# Patient Record
Sex: FEMALE | Race: WHITE | NOT HISPANIC OR LATINO | Employment: OTHER | URBAN - METROPOLITAN AREA
[De-identification: names, ages, dates, MRNs, and addresses within clinical notes are randomized per-mention and may not be internally consistent; named-entity substitution may affect disease eponyms.]

---

## 2017-07-13 ENCOUNTER — HOSPITAL ENCOUNTER (OUTPATIENT)
Dept: CT IMAGING | Facility: CLINIC | Age: 82
Discharge: HOME | End: 2017-07-13
Attending: PLASTIC SURGERY | Admitting: PLASTIC SURGERY
Payer: COMMERCIAL

## 2017-07-13 DIAGNOSIS — K43.9 VENTRAL HERNIA: ICD-10-CM

## 2017-07-13 LAB
CREAT BLD-MCNC: 0.9 MG/DL (ref 0.52–1.04)
GFR SERPL CREATININE-BSD FRML MDRD: 60 ML/MIN/1.7M2

## 2017-07-13 PROCEDURE — 25000125 ZZHC RX 250: Performed by: PLASTIC SURGERY

## 2017-07-13 PROCEDURE — 74177 CT ABD & PELVIS W/CONTRAST: CPT

## 2017-07-13 PROCEDURE — 25000128 H RX IP 250 OP 636: Performed by: PLASTIC SURGERY

## 2017-07-13 PROCEDURE — 82565 ASSAY OF CREATININE: CPT

## 2017-07-13 RX ORDER — IOPAMIDOL 755 MG/ML
93 INJECTION, SOLUTION INTRAVASCULAR ONCE
Status: COMPLETED | OUTPATIENT
Start: 2017-07-13 | End: 2017-07-13

## 2017-07-13 RX ADMIN — IOPAMIDOL 93 ML: 755 INJECTION, SOLUTION INTRAVENOUS at 11:05

## 2017-07-13 RX ADMIN — SODIUM CHLORIDE 68 ML: 9 INJECTION, SOLUTION INTRAVENOUS at 11:05

## 2018-05-25 ENCOUNTER — APPOINTMENT (OUTPATIENT)
Dept: GENERAL RADIOLOGY | Facility: CLINIC | Age: 83
End: 2018-05-25
Attending: EMERGENCY MEDICINE
Payer: COMMERCIAL

## 2018-05-25 ENCOUNTER — HOSPITAL ENCOUNTER (EMERGENCY)
Facility: CLINIC | Age: 83
Discharge: HOME | End: 2018-05-25
Attending: EMERGENCY MEDICINE | Admitting: EMERGENCY MEDICINE
Payer: COMMERCIAL

## 2018-05-25 VITALS
TEMPERATURE: 98.8 F | HEART RATE: 76 BPM | RESPIRATION RATE: 20 BRPM | WEIGHT: 185 LBS | DIASTOLIC BLOOD PRESSURE: 70 MMHG | SYSTOLIC BLOOD PRESSURE: 167 MMHG | BODY MASS INDEX: 32.78 KG/M2 | OXYGEN SATURATION: 96 % | HEIGHT: 63 IN

## 2018-05-25 DIAGNOSIS — R09.89 UPPER RESPIRATORY SYMPTOM: ICD-10-CM

## 2018-05-25 LAB
ALBUMIN SERPL-MCNC: 4.1 G/DL (ref 3.4–5)
ALP SERPL-CCNC: 69 U/L (ref 40–150)
ALT SERPL W P-5'-P-CCNC: 18 U/L (ref 0–50)
ANION GAP SERPL CALCULATED.3IONS-SCNC: 7 MMOL/L (ref 3–14)
AST SERPL W P-5'-P-CCNC: 17 U/L (ref 0–45)
BASOPHILS # BLD AUTO: 0 10E9/L (ref 0–0.2)
BASOPHILS NFR BLD AUTO: 0.1 %
BILIRUB SERPL-MCNC: 0.4 MG/DL (ref 0.2–1.3)
BUN SERPL-MCNC: 14 MG/DL (ref 7–30)
CALCIUM SERPL-MCNC: 9.7 MG/DL (ref 8.5–10.1)
CHLORIDE SERPL-SCNC: 104 MMOL/L (ref 94–109)
CO2 SERPL-SCNC: 27 MMOL/L (ref 20–32)
CREAT SERPL-MCNC: 0.7 MG/DL (ref 0.52–1.04)
DIFFERENTIAL METHOD BLD: NORMAL
EOSINOPHIL # BLD AUTO: 0.1 10E9/L (ref 0–0.7)
EOSINOPHIL NFR BLD AUTO: 1.1 %
ERYTHROCYTE [DISTWIDTH] IN BLOOD BY AUTOMATED COUNT: 13.1 % (ref 10–15)
GFR SERPL CREATININE-BSD FRML MDRD: 80 ML/MIN/1.7M2
GLUCOSE SERPL-MCNC: 100 MG/DL (ref 70–99)
HCT VFR BLD AUTO: 37.9 % (ref 35–47)
HGB BLD-MCNC: 12.9 G/DL (ref 11.7–15.7)
IMM GRANULOCYTES # BLD: 0 10E9/L (ref 0–0.4)
IMM GRANULOCYTES NFR BLD: 0.2 %
LYMPHOCYTES # BLD AUTO: 1.6 10E9/L (ref 0.8–5.3)
LYMPHOCYTES NFR BLD AUTO: 17.8 %
MCH RBC QN AUTO: 30.9 PG (ref 26.5–33)
MCHC RBC AUTO-ENTMCNC: 34 G/DL (ref 31.5–36.5)
MCV RBC AUTO: 91 FL (ref 78–100)
MONOCYTES # BLD AUTO: 0.8 10E9/L (ref 0–1.3)
MONOCYTES NFR BLD AUTO: 9.1 %
NEUTROPHILS # BLD AUTO: 6.3 10E9/L (ref 1.6–8.3)
NEUTROPHILS NFR BLD AUTO: 71.7 %
NRBC # BLD AUTO: 0 10*3/UL
NRBC BLD AUTO-RTO: 0 /100
PLATELET # BLD AUTO: 222 10E9/L (ref 150–450)
POTASSIUM SERPL-SCNC: 3.5 MMOL/L (ref 3.4–5.3)
PROT SERPL-MCNC: 7.4 G/DL (ref 6.8–8.8)
RBC # BLD AUTO: 4.18 10E12/L (ref 3.8–5.2)
SODIUM SERPL-SCNC: 138 MMOL/L (ref 133–144)
WBC # BLD AUTO: 8.8 10E9/L (ref 4–11)

## 2018-05-25 PROCEDURE — 99284 EMERGENCY DEPT VISIT MOD MDM: CPT | Mod: 25

## 2018-05-25 PROCEDURE — 80053 COMPREHEN METABOLIC PANEL: CPT | Performed by: EMERGENCY MEDICINE

## 2018-05-25 PROCEDURE — 71046 X-RAY EXAM CHEST 2 VIEWS: CPT

## 2018-05-25 PROCEDURE — 85025 COMPLETE CBC W/AUTO DIFF WBC: CPT | Performed by: EMERGENCY MEDICINE

## 2018-05-25 NOTE — ED NOTES
Patient misunderstood her PCP d/c instructions from appointment last week and quit taking her lasix instead of HCTZ; also was taking only 1 potassium and was suppose to be taking two. Her sodium and potassium levels were low at last appointment

## 2018-05-25 NOTE — ED PROVIDER NOTES
"  History     Chief Complaint:    HPI   Deborah Higginbotham is a 86 year old female who presents with abnormal labs. Stopped lasix instead of HCTZ a few days. Starting taking the prescribed potassium dose twice per day instead of once per day as she has been only taking this once per day for several months. Has mild \"head cold\" with runny nose and cough for last few days. Denies confusion and granddaughter reports that there is no confusion. Patient got confused about which medication she should stop and not take but other than that, no confusion.    Allergies:  NKDA    Medications:    Lasix  HCTZ  Potassium    Past Medical History:    High cholesterol  HTN    Past Surgical History:    History reviewed. No pertinent surgical history.     Family History:    History reviewed. No pertinent family history.    Social History:   reports that she has quit smoking. She has never used smokeless tobacco. She reports that she does not use illicit drugs.  PCP: Stu Stanford     Review of Systems  10 point review of systems was obtained and negative other than mentioned above.    Physical Exam   Patient Vitals for the past 24 hrs:   BP Temp Temp src Pulse Resp SpO2 Height Weight   05/25/18 1632 167/70 98.8  F (37.1  C) Oral 76 20 96 % 1.6 m (5' 3\") 83.9 kg (185 lb)        Physical Exam  General: Resting comfortably on the gurney  Eyes:  The pupils are equal and round    Conjunctivae and sclerae are normal  ENT:    Moist mucous membranes  Neck:  Normal range of motion  CV:  Regular rate and rhythm    Skin warm and well perfused   Resp:  Lungs are clear    Non-labored    No rales    No wheezing   GI:  Abdomen is soft, there is no rigidity    No distension    No rebound tenderness     No abdominal tenderness  MS:  Normal muscular tone    Mild bilateral lower extremity edema at ankles/feet  Skin:  No rash or acute skin lesions noted  Neuro:   Awake, alert.      GCS 15    Speech is normal and fluent.    Face is symmetric.     Moves " all extremities  Psych: Normal affect.  Appropriate interactions.    Emergency Department Course     Imaging:    Chest XR,  PA & LAT   Final Result   IMPRESSION: Clear lungs.      RAINE ALICEA MD         All imaging results were discussed with the patient who voiced understanding of the findings.    Laboratory:  Na 138  K 3.5  Cr 0.7  CBC wnl    Interventions:  None    Emergency Department Course:  Past medical records, nursing notes, and vitals reviewed.  I performed an exam of the patient and obtained history, as documented above.    I rechecked the patient. Findings and plan explained to the Patient and granddaughter    Impression & Plan      Medical Decision Making:  Deborah Higginbotham is a 86 year old female who presented to the ED with lab abnormalities. Na was 123 a few days ago in clinic. Patient stopped lasix, continued HCTZ after this lab abnormality found. Has mild URI symptoms. Chest xray clear today. Na repeated and normal at 138. No indication for additional treatment or workup at this time. Patient neurologically intact with no confusion. Discussed that patient can restart her lasix and continue her other medications as prescribed. Has f/u with PCP in 4 days and can follow-up at that time for repeat labs.     Diagnosis:    ICD-10-CM    1. Upper respiratory symptom R09.89         Discharge Medications:  New Prescriptions    No medications on file        5/25/2018   Arina Bustos MD Goertz, Maria Kristine, MD  05/26/18 1005

## 2018-05-25 NOTE — ED AVS SNAPSHOT
Emergency Department    [address] [facility] [address]    Phone:  [phone]    Fax:  [phone]                                       Deborah Higginbotham   MRN: [id number]    Department:   Emergency Department   Date of Visit:  5/25/2018           After Visit Summary Signature Page     I have received my discharge instructions, and my questions have been answered. I have discussed any challenges I see with this plan with the nurse or doctor.    ..........................................................................................................................................  Patient/Patient Representative Signature      ..........................................................................................................................................  Patient Representative Print Name and Relationship to Patient    ..................................................               ................................................  Date                                            Time    ..........................................................................................................................................  Reviewed by Signature/Title    ...................................................              ..............................................  Date                                                            Time

## 2018-05-25 NOTE — ED AVS SNAPSHOT
Emergency Department    [address] [facility] [address]    Phone:  [phone]    Fax:  [phone]                                       Deborah Higginbotham   MRN: [id number]    Department:   Emergency Department   Date of Visit:  5/25/2018           Patient Information     Date Of Birth          9/7/1931        Your diagnoses for this visit were:     Upper respiratory symptom        You were seen by Arina Bustos MD.      Follow-up Information     Schedule an appointment as soon as possible for a visit with Stu Stanford MD.    Specialty:  Family Practice    Contact information:    [employer]  [address]  [facility] [address] [phone]          Follow up with  Emergency Department.    Specialty:  EMERGENCY MEDICINE    Why:  If symptoms worsen    Contact information:    [address] [facility] [address] [phone]        Discharge Instructions       Have the labs repeated on Tuesday as already scheduled  You can restart your lasix and continue your other medications as prescribed    24 Hour Appointment Hotline       To make an appointment at any [facility], call [phone] ([phone]). If you don't have a family doctor or clinic, we will help you find one. [place] clinics are conveniently located to serve the needs of you and your family.             Review of your medicines      Notice     You have not been prescribed any medications.            Procedures and tests performed during your visit     CBC with platelets differential    Chest XR,  PA & LAT    Comprehensive metabolic panel      Orders Needing Specimen Collection     None      Pending Results     Date and Time Order Name Status Description    5/25/2018 1835 Chest XR,  PA & LAT Preliminary             Pending Culture Results     No orders found from 5/23/2018 to 5/26/2018.            Pending Results Instructions     If you had any lab results that were not finalized at the  time of your Discharge, you can call the ED Lab Result RN at [phone]. You will be contacted by this team for any positive Lab results or changes in treatment. The nurses are available 7 days a week from 10A to 6:30P.  You can leave a message 24 hours per day and they will return your call.        Test Results From Your Hospital Stay        5/25/2018  6:59 PM      Component Results     Component Value Ref Range & Units Status    Sodium 138 133 - 144 mmol/L Final    Potassium 3.5 3.4 - 5.3 mmol/L Final    Chloride 104 94 - 109 mmol/L Final    Carbon Dioxide 27 20 - 32 mmol/L Final    Anion Gap 7 3 - 14 mmol/L Final    Glucose 100 (H) 70 - 99 mg/dL Final    Urea Nitrogen 14 7 - 30 mg/dL Final    Creatinine 0.70 0.52 - 1.04 mg/dL Final    GFR Estimate 80 >60 mL/min/1.7m2 Final    Non  GFR Calc    GFR Estimate If Black >90 >60 mL/min/1.7m2 Final    African American GFR Calc    Calcium 9.7 8.5 - 10.1 mg/dL Final    Bilirubin Total 0.4 0.2 - 1.3 mg/dL Final    Albumin 4.1 3.4 - 5.0 g/dL Final    Protein Total 7.4 6.8 - 8.8 g/dL Final    Alkaline Phosphatase 69 40 - 150 U/L Final    ALT 18 0 - 50 U/L Final    AST 17 0 - 45 U/L Final         5/25/2018  6:42 PM      Component Results     Component Value Ref Range & Units Status    WBC 8.8 4.0 - 11.0 10e9/L Final    RBC Count 4.18 3.8 - 5.2 10e12/L Final    Hemoglobin 12.9 11.7 - 15.7 g/dL Final    Hematocrit 37.9 35.0 - 47.0 % Final    MCV 91 78 - 100 fl Final    MCH 30.9 26.5 - 33.0 pg Final    MCHC 34.0 31.5 - 36.5 g/dL Final    RDW 13.1 10.0 - 15.0 % Final    Platelet Count 222 150 - 450 10e9/L Final    Diff Method Automated Method  Final    % Neutrophils 71.7 % Final    % Lymphocytes 17.8 % Final    % Monocytes 9.1 % Final    % Eosinophils 1.1 % Final    % Basophils 0.1 % Final    % Immature Granulocytes 0.2 % Final    Nucleated RBCs 0 0 /100 Final    Absolute Neutrophil 6.3 1.6 - 8.3 10e9/L Final    Absolute Lymphocytes 1.6 0.8 - 5.3 10e9/L Final     Absolute Monocytes 0.8 0.0 - 1.3 10e9/L Final    Absolute Eosinophils 0.1 0.0 - 0.7 10e9/L Final    Absolute Basophils 0.0 0.0 - 0.2 10e9/L Final    Abs Immature Granulocytes 0.0 0 - 0.4 10e9/L Final    Absolute Nucleated RBC 0.0  Final         5/25/2018  7:26 PM      Narrative     CHEST TWO VIEWS 5/25/2018 7:22 PM     COMPARISON: None.    HISTORY: Cough.    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 The cardiac  silhouette, pulmonary vasculature, lungs and pleural spaces are within  normal limits.        Impression     IMPRESSION: Clear lungs.                Clinical Quality Measure: Blood Pressure Screening     Your blood pressure was checked while you were in the emergency department today. The last reading we obtained was  BP: 167/70 . Please read the guidelines below about what these numbers mean and what you should do about them.  If your systolic blood pressure (the top number) is less than 120 and your diastolic blood pressure (the bottom number) is less than 80, then your blood pressure is normal. There is nothing more that you need to do about it.  If your systolic blood pressure (the top number) is 120-139 or your diastolic blood pressure (the bottom number) is 80-89, your blood pressure may be higher than it should be. You should have your blood pressure rechecked within a year by a primary care provider.  If your systolic blood pressure (the top number) is 140 or greater or your diastolic blood pressure (the bottom number) is 90 or greater, you may have high blood pressure. High blood pressure is treatable, but if left untreated over time it can put you at risk for heart attack, stroke, or kidney failure. You should have your blood pressure rechecked by a primary care provider within the next 4 weeks.  If your provider in the emergency department today gave you specific instructions to follow-up with your doctor or provider even sooner than that, you should follow that instruction and not  "wait for up to 4 weeks for your follow-up visit.        Thank you for choosing [facility]       Thank you for choosing [facility] for your care. Our goal is always to provide you with excellent care. Hearing back from our patients is one way we can continue to improve our services. Please take a few minutes to complete the written survey that you may receive in the mail after you visit with us. Thank you!        [employer] Information     [facility] lets you send messages to your doctor, view your test results, renew your prescriptions, schedule appointments and more. To sign up, go to [email or web address] . Click on \"Log in\" on the left side of the screen, which will take you to the Welcome page. Then click on \"Sign up Now\" on the right side of the page.     You will be asked to enter the access code listed below, as well as some personal information. Please follow the directions to create your username and password.     Your access code is: [email or web address]  Expires: 2018  8:15 PM     Your access code will  in 90 days. If you need help or a new code, please call your [facility] clinic or [phone].        Care EveryWhere ID     This is your Care EveryWhere ID. This could be used by other organizations to access your [facility] medical records  [id number]        Equal Access to Services     TIFFANIE ROBERSON AH: Inocencio Rocha, waaxda luqadaha, qaybta kaalmada guzman, gloria villa. So [facility] [phone].    ATENCIÓN: Si habla español, tiene a cook disposición servicios gratuitos de asistencia lingüística. Llame al [phone].    We comply with applicable federal civil rights laws and [place] laws. We do not discriminate on the basis of race, color, national origin, age, disability, sex, sexual orientation, or gender identity.            After Visit Summary       This is your record. Keep this with you and show to your community pharmacist(s) and doctor(s) at your next " visit.

## 2018-05-26 NOTE — DISCHARGE INSTRUCTIONS
Have the labs repeated on Tuesday as already scheduled  You can restart your lasix and continue your other medications as prescribed

## 2018-07-11 DIAGNOSIS — I10 HTN (HYPERTENSION): ICD-10-CM

## 2018-07-11 DIAGNOSIS — R60.9 EDEMA: Primary | ICD-10-CM

## 2018-07-20 ENCOUNTER — HOSPITAL ENCOUNTER (OUTPATIENT)
Dept: CARDIOLOGY | Facility: CLINIC | Age: 83
Discharge: HOME | End: 2018-07-20
Attending: INTERNAL MEDICINE | Admitting: INTERNAL MEDICINE
Payer: COMMERCIAL

## 2018-07-20 DIAGNOSIS — R60.9 EDEMA: ICD-10-CM

## 2018-07-20 DIAGNOSIS — I10 HTN (HYPERTENSION): ICD-10-CM

## 2018-07-20 PROCEDURE — 93306 TTE W/DOPPLER COMPLETE: CPT | Mod: 26 | Performed by: INTERNAL MEDICINE

## 2018-07-20 PROCEDURE — 93306 TTE W/DOPPLER COMPLETE: CPT

## 2018-08-04 ENCOUNTER — ANESTHESIA EVENT (OUTPATIENT)
Dept: MEDSURG UNIT | Facility: CLINIC | Age: 83
End: 2018-08-04
Payer: COMMERCIAL

## 2018-08-04 ENCOUNTER — ANESTHESIA (OUTPATIENT)
Dept: MEDSURG UNIT | Facility: CLINIC | Age: 83
End: 2018-08-04
Payer: COMMERCIAL

## 2018-08-04 ENCOUNTER — HOSPITAL ENCOUNTER (OUTPATIENT)
Facility: CLINIC | Age: 83
Setting detail: OBSERVATION
Discharge: HOME | End: 2018-08-05
Attending: EMERGENCY MEDICINE | Admitting: INTERNAL MEDICINE
Payer: COMMERCIAL

## 2018-08-04 DIAGNOSIS — I16.0 HYPERTENSIVE URGENCY: ICD-10-CM

## 2018-08-04 LAB
ANION GAP SERPL CALCULATED.3IONS-SCNC: 10 MMOL/L (ref 3–14)
BASOPHILS # BLD AUTO: 0 10E9/L (ref 0–0.2)
BASOPHILS NFR BLD AUTO: 0.3 %
BUN SERPL-MCNC: 18 MG/DL (ref 7–30)
CALCIUM SERPL-MCNC: 9.1 MG/DL (ref 8.5–10.1)
CHLORIDE SERPL-SCNC: 103 MMOL/L (ref 94–109)
CO2 SERPL-SCNC: 26 MMOL/L (ref 20–32)
CREAT SERPL-MCNC: 0.93 MG/DL (ref 0.52–1.04)
DIFFERENTIAL METHOD BLD: NORMAL
EOSINOPHIL # BLD AUTO: 0 10E9/L (ref 0–0.7)
EOSINOPHIL NFR BLD AUTO: 0.7 %
ERYTHROCYTE [DISTWIDTH] IN BLOOD BY AUTOMATED COUNT: 12.7 % (ref 10–15)
GFR SERPL CREATININE-BSD FRML MDRD: 57 ML/MIN/1.7M2
GLUCOSE SERPL-MCNC: 98 MG/DL (ref 70–99)
HCT VFR BLD AUTO: 39.4 % (ref 35–47)
HGB BLD-MCNC: 13.4 G/DL (ref 11.7–15.7)
IMM GRANULOCYTES # BLD: 0 10E9/L (ref 0–0.4)
IMM GRANULOCYTES NFR BLD: 0.2 %
LYMPHOCYTES # BLD AUTO: 1.7 10E9/L (ref 0.8–5.3)
LYMPHOCYTES NFR BLD AUTO: 28.8 %
MCH RBC QN AUTO: 30.8 PG (ref 26.5–33)
MCHC RBC AUTO-ENTMCNC: 34 G/DL (ref 31.5–36.5)
MCV RBC AUTO: 91 FL (ref 78–100)
MONOCYTES # BLD AUTO: 0.6 10E9/L (ref 0–1.3)
MONOCYTES NFR BLD AUTO: 9.9 %
NEUTROPHILS # BLD AUTO: 3.5 10E9/L (ref 1.6–8.3)
NEUTROPHILS NFR BLD AUTO: 60.1 %
NRBC # BLD AUTO: 0 10*3/UL
NRBC BLD AUTO-RTO: 0 /100
PLATELET # BLD AUTO: 204 10E9/L (ref 150–450)
POTASSIUM SERPL-SCNC: 3.7 MMOL/L (ref 3.4–5.3)
RBC # BLD AUTO: 4.35 10E12/L (ref 3.8–5.2)
SODIUM SERPL-SCNC: 139 MMOL/L (ref 133–144)
TROPONIN I SERPL-MCNC: <0.015 UG/L (ref 0–0.04)
WBC # BLD AUTO: 5.8 10E9/L (ref 4–11)

## 2018-08-04 PROCEDURE — 96374 THER/PROPH/DIAG INJ IV PUSH: CPT

## 2018-08-04 PROCEDURE — 25000128 H RX IP 250 OP 636: Performed by: INTERNAL MEDICINE

## 2018-08-04 PROCEDURE — 25000132 ZZH RX MED GY IP 250 OP 250 PS 637: Performed by: INTERNAL MEDICINE

## 2018-08-04 PROCEDURE — 84484 ASSAY OF TROPONIN QUANT: CPT | Performed by: EMERGENCY MEDICINE

## 2018-08-04 PROCEDURE — 99285 EMERGENCY DEPT VISIT HI MDM: CPT | Mod: 25

## 2018-08-04 PROCEDURE — 80048 BASIC METABOLIC PNL TOTAL CA: CPT | Performed by: EMERGENCY MEDICINE

## 2018-08-04 PROCEDURE — 85025 COMPLETE CBC W/AUTO DIFF WBC: CPT | Performed by: EMERGENCY MEDICINE

## 2018-08-04 PROCEDURE — 99207 ZZC CDG-HISTORY COMP: MEETS EXP. PROBLEM FOCUSED-DOWN CODED LACK OF ROS: CPT | Performed by: INTERNAL MEDICINE

## 2018-08-04 PROCEDURE — 99221 1ST HOSP IP/OBS SF/LOW 40: CPT | Mod: AI | Performed by: INTERNAL MEDICINE

## 2018-08-04 PROCEDURE — 25000128 H RX IP 250 OP 636: Performed by: EMERGENCY MEDICINE

## 2018-08-04 PROCEDURE — 40000671 ZZH STATISTIC ANESTHESIA CASE

## 2018-08-04 PROCEDURE — 93005 ELECTROCARDIOGRAM TRACING: CPT

## 2018-08-04 PROCEDURE — 12000000 ZZH R&B MED SURG/OB

## 2018-08-04 RX ORDER — TORSEMIDE 10 MG/1
10 TABLET ORAL DAILY
Status: DISCONTINUED | OUTPATIENT
Start: 2018-08-05 | End: 2018-08-05 | Stop reason: HOSPADM

## 2018-08-04 RX ORDER — ONDANSETRON 2 MG/ML
4 INJECTION INTRAMUSCULAR; INTRAVENOUS EVERY 6 HOURS PRN
Status: DISCONTINUED | OUTPATIENT
Start: 2018-08-04 | End: 2018-08-05 | Stop reason: HOSPADM

## 2018-08-04 RX ORDER — ONDANSETRON 4 MG/1
4 TABLET, ORALLY DISINTEGRATING ORAL EVERY 6 HOURS PRN
Status: DISCONTINUED | OUTPATIENT
Start: 2018-08-04 | End: 2018-08-05 | Stop reason: HOSPADM

## 2018-08-04 RX ORDER — HYDROMORPHONE HYDROCHLORIDE 1 MG/ML
.3-.5 INJECTION, SOLUTION INTRAMUSCULAR; INTRAVENOUS; SUBCUTANEOUS
Status: DISCONTINUED | OUTPATIENT
Start: 2018-08-04 | End: 2018-08-05 | Stop reason: HOSPADM

## 2018-08-04 RX ORDER — LOSARTAN POTASSIUM 100 MG/1
100 TABLET ORAL DAILY
Status: DISCONTINUED | OUTPATIENT
Start: 2018-08-05 | End: 2018-08-05 | Stop reason: HOSPADM

## 2018-08-04 RX ORDER — NALOXONE HYDROCHLORIDE 0.4 MG/ML
.1-.4 INJECTION, SOLUTION INTRAMUSCULAR; INTRAVENOUS; SUBCUTANEOUS
Status: DISCONTINUED | OUTPATIENT
Start: 2018-08-04 | End: 2018-08-05 | Stop reason: HOSPADM

## 2018-08-04 RX ORDER — NALOXONE HYDROCHLORIDE 0.4 MG/ML
.1-.4 INJECTION, SOLUTION INTRAMUSCULAR; INTRAVENOUS; SUBCUTANEOUS
Status: DISCONTINUED | OUTPATIENT
Start: 2018-08-04 | End: 2018-08-04

## 2018-08-04 RX ORDER — HYDRALAZINE HYDROCHLORIDE 25 MG/1
25 TABLET, FILM COATED ORAL 4 TIMES DAILY
Status: DISCONTINUED | OUTPATIENT
Start: 2018-08-04 | End: 2018-08-05 | Stop reason: HOSPADM

## 2018-08-04 RX ORDER — ACETAMINOPHEN 325 MG/1
650 TABLET ORAL EVERY 4 HOURS PRN
Status: DISCONTINUED | OUTPATIENT
Start: 2018-08-04 | End: 2018-08-05 | Stop reason: HOSPADM

## 2018-08-04 RX ORDER — METOPROLOL SUCCINATE 25 MG/1
25 TABLET, EXTENDED RELEASE ORAL DAILY
COMMUNITY
Start: 2018-05-25 | End: 2018-10-04

## 2018-08-04 RX ORDER — POTASSIUM CHLORIDE 750 MG/1
10 CAPSULE, EXTENDED RELEASE ORAL 2 TIMES DAILY
COMMUNITY
Start: 2018-07-02

## 2018-08-04 RX ORDER — PRAVASTATIN SODIUM 40 MG
40 TABLET ORAL AT BEDTIME
Status: DISCONTINUED | OUTPATIENT
Start: 2018-08-04 | End: 2018-08-05 | Stop reason: HOSPADM

## 2018-08-04 RX ORDER — HYDRALAZINE HYDROCHLORIDE 20 MG/ML
10 INJECTION INTRAMUSCULAR; INTRAVENOUS ONCE
Status: COMPLETED | OUTPATIENT
Start: 2018-08-04 | End: 2018-08-04

## 2018-08-04 RX ORDER — HYDRALAZINE HYDROCHLORIDE 20 MG/ML
10 INJECTION INTRAMUSCULAR; INTRAVENOUS EVERY 4 HOURS PRN
Status: DISCONTINUED | OUTPATIENT
Start: 2018-08-04 | End: 2018-08-05 | Stop reason: HOSPADM

## 2018-08-04 RX ORDER — METOPROLOL SUCCINATE 25 MG/1
25 TABLET, EXTENDED RELEASE ORAL DAILY
Status: DISCONTINUED | OUTPATIENT
Start: 2018-08-05 | End: 2018-08-05 | Stop reason: HOSPADM

## 2018-08-04 RX ORDER — ACETAMINOPHEN 325 MG/1
1300 TABLET ORAL EVERY EVENING
Status: DISCONTINUED | OUTPATIENT
Start: 2018-08-04 | End: 2018-08-05 | Stop reason: HOSPADM

## 2018-08-04 RX ORDER — POTASSIUM CHLORIDE 750 MG/1
20 CAPSULE, EXTENDED RELEASE ORAL DAILY
Status: DISCONTINUED | OUTPATIENT
Start: 2018-08-05 | End: 2018-08-04 | Stop reason: RX

## 2018-08-04 RX ORDER — POTASSIUM CHLORIDE 1.5 G/1.58G
20 POWDER, FOR SOLUTION ORAL DAILY
Status: DISCONTINUED | OUTPATIENT
Start: 2018-08-05 | End: 2018-08-05 | Stop reason: HOSPADM

## 2018-08-04 RX ADMIN — HYDRALAZINE HYDROCHLORIDE 25 MG: 25 TABLET ORAL at 19:55

## 2018-08-04 RX ADMIN — ACETAMINOPHEN 1300 MG: 325 TABLET, FILM COATED ORAL at 19:49

## 2018-08-04 RX ADMIN — HYDRALAZINE HYDROCHLORIDE 25 MG: 25 TABLET ORAL at 22:39

## 2018-08-04 RX ADMIN — Medication 0.3 MG: at 20:43

## 2018-08-04 RX ADMIN — HYDRALAZINE HYDROCHLORIDE 10 MG: 20 INJECTION INTRAMUSCULAR; INTRAVENOUS at 15:38

## 2018-08-04 RX ADMIN — PRAVASTATIN SODIUM 40 MG: 40 TABLET ORAL at 22:39

## 2018-08-04 ASSESSMENT — ACTIVITIES OF DAILY LIVING (ADL)
COGNITION: 0 - NO COGNITION ISSUES REPORTED
TOILETING: 0-->INDEPENDENT
BATHING: 0-->INDEPENDENT
DRESS: 0-->INDEPENDENT
SWALLOWING: 0-->SWALLOWS FOODS/LIQUIDS WITHOUT DIFFICULTY
RETIRED_EATING: 0-->INDEPENDENT
FALL_HISTORY_WITHIN_LAST_SIX_MONTHS: NO
ADLS_ACUITY_SCORE: 14
RETIRED_COMMUNICATION: 0-->UNDERSTANDS/COMMUNICATES WITHOUT DIFFICULTY
TRANSFERRING: 0-->INDEPENDENT
AMBULATION: 0-->INDEPENDENT

## 2018-08-04 NOTE — PROGRESS NOTES
RECEIVING UNIT ED HANDOFF REVIEW    ED Nurse Handoff Report was reviewed by: Clarice Gallardo on August 4, 2018 at 5:37 PM

## 2018-08-04 NOTE — PHARMACY-ADMISSION MEDICATION HISTORY
Admission medication history interview status for the 8/4/2018  admission is complete. See EPIC admission navigator for prior to admission medications     Medication history source reliability:Good    Actions taken by pharmacist (provider contacted, etc):  Interviewed patient and verified med list with prescription bottles brought in by the patient   Vitamin d3 strength verified using care everywhere.    Additional medication history information not noted on PTA med list :  Patient should be taking potassium twice daily but instead takes 2 caps in the morning    Medication reconciliation/reorder completed by provider prior to medication history? No    Time spent in this activity: 30 min    Prior to Admission medications    Medication Sig Last Dose Taking? Auth Provider   ACETAMINOPHEN PO Take 1,300 mg by mouth every evening 2 x 650 mg tablet 8/3/2018 at pm Yes Unknown, Entered By History   metoprolol succinate (TOPROL-XL) 25 MG 24 hr tablet Take 25 mg by mouth daily  8/4/2018 at am Yes Reported, Patient   OLMESARTAN MEDOXOMIL PO Take 40 mg by mouth daily  8/4/2018 at am Yes Reported, Patient   potassium chloride (MICRO-K) 10 MEQ CR capsule Take 20 mEq by mouth daily (2 capsules in the morning) 8/4/2018 at am Yes Reported, Patient   PRAVASTATIN SODIUM PO Take 40 mg by mouth At Bedtime  8/3/2018 at pm Yes Reported, Patient   TORSEMIDE PO Take 10 mg by mouth daily (2 x 5mg tablets) 8/4/2018 at am Yes Reported, Patient   VITAMIN D, CHOLECALCIFEROL, PO Take 2,000 Units by mouth daily  8/4/2018 at am Yes Unknown, Entered By History

## 2018-08-04 NOTE — IP AVS SNAPSHOT
[facility] [address] [facility]    [address] [place] [address]    Phone:  [phone]                                       After Visit Summary   8/4/2018    Deborah Higginbotham    MRN: [id number]           After Visit Summary Signature Page     I have received my discharge instructions, and my questions have been answered. I have discussed any challenges I see with this plan with the nurse or doctor.    ..........................................................................................................................................  Patient/Patient Representative Signature      ..........................................................................................................................................  Patient Representative Print Name and Relationship to Patient    ..................................................               ................................................  Date                                            Time    ..........................................................................................................................................  Reviewed by Signature/Title    ...................................................              ..............................................  Date                                                            Time

## 2018-08-04 NOTE — ED NOTES
[facility]  ED Nurse Handoff Report    ED Chief complaint: Hypertension (225/94 at home.  States +head pressure, has been monitoring at home with family. )      ED Diagnosis:   Final diagnoses:   Hypertensive urgency       Code Status: Full Code    Allergies: No Known Allergies    Activity level - Baseline/Home:  Independent    Activity Level - Current:   Independent     Needed?: No    Isolation: No  Infection: Not Applicable  Bariatric?: No    Vital Signs:   Vitals:    08/04/18 1519 08/04/18 1545 08/04/18 1600 08/04/18 1615   BP: (!) 215/105 (!) 191/96 (!) 203/88 (!) 197/98   Resp:  20 19 18   Temp:       TempSrc:       SpO2: 99% 97% 98% 97%   Weight:       Height:           Cardiac Rhythm: ,        Pain level: 0-10 Pain Scale: 5    Is this patient confused?: No   [place] - Suicide Severity Rating Scale Completed?  Yes  If yes, what color did the patient score?  White    Patient Report: Initial Complaint: Hypertension  Focused Assessment: Patient with a history of long standing Hypertension, been working with Nephrologist to adjust blood pressure medications over last week but failed to correct blood pressure. This morning started to appreciate headache and had ill feeling prompted to come to ED for evaluation.   Tests Performed: labs  Abnormal Results:   Results for orders placed or performed during the hospital encounter of 08/04/18   CBC with platelets differential   Result Value Ref Range    WBC 5.8 4.0 - 11.0 10e9/L    RBC Count 4.35 3.8 - 5.2 10e12/L    Hemoglobin 13.4 11.7 - 15.7 g/dL    Hematocrit 39.4 35.0 - 47.0 %    MCV 91 78 - 100 fl    MCH 30.8 26.5 - 33.0 pg    MCHC 34.0 31.5 - 36.5 g/dL    RDW 12.7 10.0 - 15.0 %    Platelet Count 204 150 - 450 10e9/L    Diff Method Automated Method     % Neutrophils 60.1 %    % Lymphocytes 28.8 %    % Monocytes 9.9 %    % Eosinophils 0.7 %    % Basophils 0.3 %    % Immature Granulocytes 0.2 %    Nucleated RBCs 0 0 /100    Absolute  Neutrophil 3.5 1.6 - 8.3 10e9/L    Absolute Lymphocytes 1.7 0.8 - 5.3 10e9/L    Absolute Monocytes 0.6 0.0 - 1.3 10e9/L    Absolute Eosinophils 0.0 0.0 - 0.7 10e9/L    Absolute Basophils 0.0 0.0 - 0.2 10e9/L    Abs Immature Granulocytes 0.0 0 - 0.4 10e9/L    Absolute Nucleated RBC 0.0    Basic metabolic panel   Result Value Ref Range    Sodium 139 133 - 144 mmol/L    Potassium 3.7 3.4 - 5.3 mmol/L    Chloride 103 94 - 109 mmol/L    Carbon Dioxide 26 20 - 32 mmol/L    Anion Gap 10 3 - 14 mmol/L    Glucose 98 70 - 99 mg/dL    Urea Nitrogen 18 7 - 30 mg/dL    Creatinine 0.93 0.52 - 1.04 mg/dL    GFR Estimate 57 (L) >60 mL/min/1.7m2    GFR Estimate If Black 69 >60 mL/min/1.7m2    Calcium 9.1 8.5 - 10.1 mg/dL   Troponin I   Result Value Ref Range    Troponin I ES <0.015 0.000 - 0.045 ug/L       Treatments provided: Hydralazine    Family Comments: daughter at bedside    OBS brochure/video discussed/provided to patient: Yes    ED Medications:   Medications   hydrALAZINE (APRESOLINE) injection 10 mg (10 mg Intravenous Given 8/4/18 6818)       Drips infusing?:  No    For the majority of the shift this patient was Green.   Interventions performed were none.    Severe Sepsis OR Septic Shock Diagnosis Present: No      ED NURSE PHONE NUMBER: [phone]

## 2018-08-04 NOTE — IP AVS SNAPSHOT
MRN:[id number]                      After Visit Summary   8/4/2018    Deborah Higginbotham    MRN: [id number]           Thank you!     Thank you for choosing [facility] for your care. Our goal is always to provide you with excellent care. Hearing back from our patients is one way we can continue to improve our services. Please take a few minutes to complete the written survey that you may receive in the mail after you visit with us. Thank you!        Patient Information     Date Of Birth          9/7/1931        Designated Caregiver       Most Recent Value    Caregiver    Will someone help with your care after discharge? yes    Name of designated caregiver  Briana Grimm    Phone number of caregiver [phone]    Caregiver address [address] [place] [address]      About your hospital stay     You were admitted on:  August 4, 2018 You last received care in the:  [facility] [address] [facility]    You were discharged on:  August 5, 2018        Reason for your hospital stay       You were admitted for hypertensive urgency                  Who to Call     For medical emergencies, please call 911.  For non-urgent questions about your medical care, please call your primary care provider or clinic, [phone]          Attending Provider     Provider Specialty    Ankit Guallpa MD Emergency Medicine    [facility], Fam Gee MD Internal Medicine       Primary Care Provider Office Phone # Fax #    Stu Stanford [phone]      After Care Instructions     Activity       Your activity upon discharge: activity as tolerated            Diet       Follow this diet upon discharge: Orders Placed This Encounter      Regular Diet Adult                  Follow-up Appointments     Follow-up and recommended labs and tests        Follow up with primary care provider, Stu Stanford, within 7 days for hospital follow- up.  No follow up labs or test are needed.       "            Pending Results     Date and Time Order Name Status Description    8/4/2018 1944 US Renal Complete w Duplex Complete In process             Statement of Approval     Ordered          08/05/18 1311  I have reviewed and agree with all the recommendations and orders detailed in this document.  EFFECTIVE NOW     Approved and electronically signed by:  Fam Diaz MD             Admission Information     Date & Time Provider Department Dept. Phone    8/4/2018 Fam Diaz MD [place] [facility] [address] [facility] [phone]      Your Vitals Were     Blood Pressure Pulse Temperature Respirations Height Weight    124/72 (BP Location: Right arm) 75 98.3  F (36.8  C) (Oral) 16 1.575 m (5' 2\") 83.9 kg (185 lb)    Pulse Oximetry BMI (Body Mass Index)                97% 33.84 kg/m2          Care EveryWhere ID     This is your Care EveryWhere ID. This could be used by other organizations to access your [facility] medical records  [id number]        Equal Access to Services     TIFFANIE ROBERSON AH: Hadii ronnell owen hadasho Soomaali, waaxda luqadaha, qaybta kaalmada adeegyada, gloria bustillo . So [facility] [phone].    ATENCIÓN: Si habla español, tiene a cook disposición servicios gratuitos de asistencia lingüística. Llame al [phone].    We comply with applicable federal civil rights laws and [place] laws. We do not discriminate on the basis of race, color, national origin, age, disability, sex, sexual orientation, or gender identity.               Review of your medicines      START taking        Dose / Directions    hydrALAZINE 25 MG tablet   Commonly known as:  APRESOLINE        Dose:  25 mg   Take 1 tablet (25 mg) by mouth 3 times daily   Quantity:  60 tablet   Refills:  1         CONTINUE these medicines which have NOT CHANGED        Dose / Directions    ACETAMINOPHEN PO        Dose:  1300 mg   Take 1,300 mg by mouth every evening 2 x 650 mg tablet   Refills:  0       " metoprolol succinate 25 MG 24 hr tablet   Commonly known as:  TOPROL-XL        Dose:  25 mg   Take 25 mg by mouth daily   Refills:  0       OLMESARTAN MEDOXOMIL PO   Notes to Patient:  Resume home schedule.         Dose:  40 mg   Take 40 mg by mouth daily   Refills:  0       potassium chloride 10 MEQ CR capsule   Commonly known as:  MICRO-K        Dose:  20 mEq   Take 20 mEq by mouth daily (2 capsules in the morning)   Refills:  0       PRAVASTATIN SODIUM PO        Dose:  40 mg   Take 40 mg by mouth At Bedtime   Refills:  0       TORSEMIDE PO        Dose:  10 mg   Take 10 mg by mouth daily (2 x 5mg tablets)   Refills:  0       VITAMIN D (CHOLECALCIFEROL) PO        Dose:  2000 Units   Take 2,000 Units by mouth daily   Refills:  0            Where to get your medicines      Some of these will need a paper prescription and others can be bought over the counter. Ask your nurse if you have questions.     Bring a paper prescription for each of these medications     hydrALAZINE 25 MG tablet                Protect others around you: Learn how to safely use, store and throw away your medicines at www.disposemymeds.org.             Medication List: This is a list of all your medications and when to take them. Check marks below indicate your daily home schedule. Keep this list as a reference.      Medications           Morning Afternoon Evening Bedtime As Needed    ACETAMINOPHEN PO   Take 1,300 mg by mouth every evening 2 x 650 mg tablet   Last time this was given:  650 mg on 8/5/2018  7:16 AM   Next Dose Due:  8/5/2018 8pm                                   hydrALAZINE 25 MG tablet   Commonly known as:  APRESOLINE   Take 1 tablet (25 mg) by mouth 3 times daily   Last time this was given:  25 mg on 8/5/2018 12:28 PM   Next Dose Due:  8/5/2018 Bedtime                                          metoprolol succinate 25 MG 24 hr tablet   Commonly known as:  TOPROL-XL   Take 25 mg by mouth daily   Last time this was given:  25 mg on  8/5/2018  8:20 AM   Next Dose Due:  8/6/2018 9am .                                    OLMESARTAN MEDOXOMIL PO   Take 40 mg by mouth daily   Notes to Patient:  Resume home schedule.                                 potassium chloride 10 MEQ CR capsule   Commonly known as:  MICRO-K   Take 20 mEq by mouth daily (2 capsules in the morning)   Next Dose Due:  8/6/2018 9am                                    PRAVASTATIN SODIUM PO   Take 40 mg by mouth At Bedtime   Last time this was given:  40 mg on 8/4/2018 10:39 PM   Next Dose Due:  8/5/2018 Bedtime                                    TORSEMIDE PO   Take 10 mg by mouth daily (2 x 5mg tablets)   Last time this was given:  10 mg on 8/5/2018  8:20 AM   Next Dose Due:  8/6/2018 9am                                    VITAMIN D (CHOLECALCIFEROL) PO   Take 2,000 Units by mouth daily   Last time this was given:  2,000 Units on 8/5/2018  8:20 AM   Next Dose Due:  8/6/2018 9am

## 2018-08-04 NOTE — ED PROVIDER NOTES
History     Chief Complaint:  Hypertension    HPI   history supplemented by daughter at bedside    Deborah Higginbotham is a 86 year old female who presents to the emergency department today for evaluation of hypertension. The patient has been taking medication for high blood pressure for about 20 years. The patient reports she was monitoring her blood pressure at home and it was 225/94. She states she has also been experiencing a slight headache with pressure, as well as light headed which started gradually earlier today.  Her daughter reports she is under the care of a nephrologist for low sodium and high blood pressure, and just recently saw Dr. Falk who added Olmesartan 40 mg daily about 1 week ago, in addition to doubling her torsemide.  She states the patient's blood pressure was high last weekend, even after doubling the torsemide (10 mg a day). She states the patient recently had labs done with the nephrologist, which was mostly normal per her report. The patient denies chest pain or pressure. The patient denies any change in her diet or fevers.  She denies any confusion or lateralizing weakness.  She reports full compliance with her medications.    Allergies:  No Known Drug Allergies     Medications:    Metoprolol succinate 25  Olmesartan medoxomil 40  Potassium chloride  Pravastatin  Torsemide     Past Medical History:    Cancer  High cholesterol  Hypertension    Past Surgical History:    Breast surgery  Hernia repair  Orthopedic surgery    Family History:    History reviewed. No pertinent family history.      Social History:  The patient was accompanied to the ED by her daughter.  Marital Status:       Review of Systems   All other systems reviewed and are negative.    Physical Exam     Patient Vitals for the past 24 hrs:   BP Temp Temp src Pulse Heart Rate Resp SpO2 Height Weight   08/04/18 1615 (!) 197/98 - - - 69 18 97 % - -   08/04/18 1600 (!) 203/88 - - - 65 19 98 % - -   08/04/18 1545 (!)  "191/96 - - - 60 20 97 % - -   08/04/18 1519 (!) 215/105 - - - 59 - 99 % - -   08/04/18 1514 (!) 232/117 - - - - - - - -   08/04/18 1510 (!) 197/98 98.5  F (36.9  C) Oral 72 66 16 98 % 1.575 m (5' 2\") 83.9 kg (185 lb)      Physical Exam  General: woman sitting upright in room 20, daughter at bedside  HENT: mucous membranes moist, OP clear, TMs clear, face nontender  Eyes: pupils normal without nystagmus, no photophobia  CV:  regular rate, regular rhythm, no murmur audible, mild symmetric LE edema  Resp: clear throughout, normal effort  GI: abdomen soft and nontender  MSK: no bony tenderness to face, skull, or cervical spine  Skin: appropriately warm and dry, no petechiae, no vesicles  Neuro: awake, alert, clear speech, fully oriented, face symmetric,  normal, strength and sensation intact in all extr, no meningismus, ambulation not initially tested  Psych: cooperative, pleasant    Emergency Department Course     ECG:  ECG taken at 1539, ECG read at 1550  Sinus bradycardia  Left axis deviation  Nonspecific T wave abnormality  Abnormal ECG  Rate 57 bpm. CO interval 170 ms. QRS duration 100 ms. QT/QTc 462/449 ms. P-R-T axes 35 -45 18.    Laboratory:  Laboratory findings were communicated with the patient who voiced understanding of the findings.    CBC: WBC 5.8, HGB 13.4,   BMP: GFR 57 (L) o/w WNL (Creatinine 0.93)  Troponin (Collected 1528): <0.015     Interventions:  1538 Hydralazine 10 mg IV    Emergency Department Course:    1522 Nursing notes and vitals reviewed.    I performed electronic chart review in 5 examples.  The patient was placed on continuous cardiac and pulse ox monitoring.    1528 IV was inserted and blood was drawn for laboratory testing, results above.     1532 I performed an exam of the patient as documented above.     1704 I spoke with Dr. Diaz of the Hospitalist service regarding patient's presentation, findings, and  plan of care.     1753 I personally reviewed the laboratory results with " "the patient and answered all related questions  prior to admission.    Impression & Plan      Medical Decision Making:  She presents with markedly elevated blood pressure and generalized malaise with a gradual onset headache that is not worrisome for subarachnoid hemorrhage and I do not think she requires emergent neuroimaging.  No signs of endorgan damage at this time, though given that she is failing to improve and in fact has new symptoms despite reasonable and quite recent efforts by her nephrologist to optimize her antihypertensive regimen, I think she would benefit from hospitalization for close monitoring and titration of medications.  Because her blood pressure has been in the 200s for some time, I did not feel that dramatically lowering it nor a continuous intravenous infusion were indicated at this time.  The patient and her daughter understand and agree with this rationale and Dr. Diaz with the Hospitalist service accepts care.    Diagnosis:    ICD-10-CM    1. Hypertensive urgency I16.0      Disposition:   The patient is admitted into the care of Dr. Diaz.     This record was created at least in part using electronic voice recognition software, so please excuse any \"typos.\"    Scribe Disclosure:  I, Tavia Coppola, am serving as a scribe at 3:22 PM on 8/4/2018 to document services personally performed by Ankit Guallpa,  based on my observations and the provider's statements to me.      EMERGENCY DEPARTMENT       Ankit Guallpa MD  08/04/18 1800    "

## 2018-08-05 ENCOUNTER — APPOINTMENT (OUTPATIENT)
Dept: ULTRASOUND IMAGING | Facility: CLINIC | Age: 83
End: 2018-08-05
Attending: INTERNAL MEDICINE
Payer: COMMERCIAL

## 2018-08-05 VITALS
DIASTOLIC BLOOD PRESSURE: 72 MMHG | SYSTOLIC BLOOD PRESSURE: 124 MMHG | RESPIRATION RATE: 16 BRPM | HEIGHT: 62 IN | HEART RATE: 75 BPM | BODY MASS INDEX: 34.04 KG/M2 | WEIGHT: 185 LBS | OXYGEN SATURATION: 97 % | TEMPERATURE: 98.3 F

## 2018-08-05 LAB
ALBUMIN UR-MCNC: NEGATIVE MG/DL
ANION GAP SERPL CALCULATED.3IONS-SCNC: 10 MMOL/L (ref 3–14)
APPEARANCE UR: CLEAR
BILIRUB UR QL STRIP: NEGATIVE
BUN SERPL-MCNC: 15 MG/DL (ref 7–30)
CALCIUM SERPL-MCNC: 9 MG/DL (ref 8.5–10.1)
CHLORIDE SERPL-SCNC: 106 MMOL/L (ref 94–109)
CO2 SERPL-SCNC: 22 MMOL/L (ref 20–32)
COLOR UR AUTO: YELLOW
CREAT SERPL-MCNC: 0.73 MG/DL (ref 0.52–1.04)
ERYTHROCYTE [DISTWIDTH] IN BLOOD BY AUTOMATED COUNT: 12.9 % (ref 10–15)
GFR SERPL CREATININE-BSD FRML MDRD: 76 ML/MIN/1.7M2
GLUCOSE SERPL-MCNC: 116 MG/DL (ref 70–99)
GLUCOSE UR STRIP-MCNC: NEGATIVE MG/DL
HCT VFR BLD AUTO: 40.2 % (ref 35–47)
HGB BLD-MCNC: 13.7 G/DL (ref 11.7–15.7)
HGB UR QL STRIP: NEGATIVE
KETONES UR STRIP-MCNC: NEGATIVE MG/DL
LEUKOCYTE ESTERASE UR QL STRIP: NEGATIVE
MCH RBC QN AUTO: 30.6 PG (ref 26.5–33)
MCHC RBC AUTO-ENTMCNC: 34.1 G/DL (ref 31.5–36.5)
MCV RBC AUTO: 90 FL (ref 78–100)
NITRATE UR QL: NEGATIVE
PH UR STRIP: 6 PH (ref 5–7)
PLATELET # BLD AUTO: 186 10E9/L (ref 150–450)
POTASSIUM SERPL-SCNC: 3.8 MMOL/L (ref 3.4–5.3)
RBC # BLD AUTO: 4.47 10E12/L (ref 3.8–5.2)
RBC #/AREA URNS AUTO: 0 /HPF (ref 0–2)
SODIUM SERPL-SCNC: 138 MMOL/L (ref 133–144)
SOURCE: NORMAL
SP GR UR STRIP: 1.01 (ref 1–1.03)
SQUAMOUS #/AREA URNS AUTO: 1 /HPF (ref 0–1)
UROBILINOGEN UR STRIP-MCNC: NORMAL MG/DL (ref 0–2)
WBC # BLD AUTO: 5 10E9/L (ref 4–11)
WBC #/AREA URNS AUTO: 2 /HPF (ref 0–5)

## 2018-08-05 PROCEDURE — 81001 URINALYSIS AUTO W/SCOPE: CPT | Performed by: INTERNAL MEDICINE

## 2018-08-05 PROCEDURE — 80048 BASIC METABOLIC PNL TOTAL CA: CPT | Performed by: INTERNAL MEDICINE

## 2018-08-05 PROCEDURE — 85027 COMPLETE CBC AUTOMATED: CPT | Performed by: INTERNAL MEDICINE

## 2018-08-05 PROCEDURE — 93975 VASCULAR STUDY: CPT | Mod: TC

## 2018-08-05 PROCEDURE — 36415 COLL VENOUS BLD VENIPUNCTURE: CPT | Performed by: INTERNAL MEDICINE

## 2018-08-05 PROCEDURE — G0378 HOSPITAL OBSERVATION PER HR: HCPCS

## 2018-08-05 PROCEDURE — 99217 ZZC OBSERVATION CARE DISCHARGE: CPT | Performed by: INTERNAL MEDICINE

## 2018-08-05 PROCEDURE — 25000132 ZZH RX MED GY IP 250 OP 250 PS 637: Performed by: INTERNAL MEDICINE

## 2018-08-05 RX ORDER — HYDRALAZINE HYDROCHLORIDE 25 MG/1
25 TABLET, FILM COATED ORAL 4 TIMES DAILY
Qty: 60 TABLET | Refills: 1 | Status: SHIPPED | OUTPATIENT
Start: 2018-08-05 | End: 2018-08-05

## 2018-08-05 RX ORDER — HYDRALAZINE HYDROCHLORIDE 25 MG/1
25 TABLET, FILM COATED ORAL 3 TIMES DAILY
Qty: 60 TABLET | Refills: 1 | Status: SHIPPED | OUTPATIENT
Start: 2018-08-05 | End: 2018-10-04

## 2018-08-05 RX ADMIN — HYDRALAZINE HYDROCHLORIDE 25 MG: 25 TABLET ORAL at 08:20

## 2018-08-05 RX ADMIN — HYDRALAZINE HYDROCHLORIDE 25 MG: 25 TABLET ORAL at 12:28

## 2018-08-05 RX ADMIN — ACETAMINOPHEN 650 MG: 325 TABLET, FILM COATED ORAL at 14:48

## 2018-08-05 RX ADMIN — ACETAMINOPHEN 650 MG: 325 TABLET, FILM COATED ORAL at 07:16

## 2018-08-05 RX ADMIN — POTASSIUM CHLORIDE 20 MEQ: 1.5 POWDER, FOR SOLUTION ORAL at 08:20

## 2018-08-05 RX ADMIN — METOPROLOL SUCCINATE 25 MG: 25 TABLET, EXTENDED RELEASE ORAL at 08:20

## 2018-08-05 RX ADMIN — VITAMIN D, TAB 1000IU (100/BT) 2000 UNITS: 25 TAB at 08:20

## 2018-08-05 RX ADMIN — TORSEMIDE 10 MG: 10 TABLET ORAL at 08:20

## 2018-08-05 RX ADMIN — LOSARTAN POTASSIUM 100 MG: 100 TABLET, FILM COATED ORAL at 08:20

## 2018-08-05 ASSESSMENT — ACTIVITIES OF DAILY LIVING (ADL)
ADLS_ACUITY_SCORE: 11

## 2018-08-05 NOTE — UTILIZATION REVIEW
"  Admission Status; Secondary Review Determination         Under the authority of the Utilization Management Committee, the utilization review process indicated a secondary review on the above patient.  The review outcome is based on review of the medical records, discussions with staff, and applying clinical experience noted on the date of the review.          (x) Observation Status Appropriate - This patient does not meet hospital inpatient criteria and is placed in observation status. If this patient's primary payer is Medicare and was admitted as an inpatient, Condition Code 44 should be used and patient status changed to \"observation\".     RATIONALE FOR DETERMINATION     The severity of illness, intensity of service provided, expected LOS and risk for adverse outcome make the care appropriate for further observation; however, doesn't meet criteria for hospital inpatient admission.   notified of this determination.    This document was produced using voice recognition software.      Patient is an 86-year-old female who was admitted through the [facility] emergency room for hypertensive urgency.  She did have a blood pressure of 225/94 and even higher when taken at her home.  She had been having increased symptoms of some lightheadedness headaches had pressure and weakness over the prior week.  She is seen by Dr. Han Hood for assessment and treatment of her blood pressure.  She did receive hydralazine in the emergency room in addition to some other medication changes and today she is improved from a symptom and blood pressure suspected in the 148/77 range.  Her creatinine is normal and other labs are unremarkable.  She apparently has a history of some mild renal insufficiency.  Ejection fraction 60%.  A renal ultrasound was done and results are pending.  I did discuss her case with Dr. Fam Diaz, her hospitalist, and he agrees that she is appropriate for observation care at this time.  A " note will be placed in the chart.    The information on this document is developed by the utilization review team in order for the business office to ensure compliance.  This only denotes the appropriateness of proper admission status and does not reflect the quality of care rendered.         The definitions of Inpatient Status and Observation Status used in making the determination above are those provided in the CMS Coverage Manual, Chapter 1 and Chapter 6, section 70.4.      Sincerely,     Robert Falcon MD  Physician Advisor  Utilization Review/ Case Management  [facility].

## 2018-08-05 NOTE — ANESTHESIA CARE TRANSFER NOTE
Patient: Deborah Higginbotham    * No procedures listed *    Diagnosis: * No pre-op diagnosis entered *  Diagnosis Additional Information: No value filed.    Anesthesia Type:   No value filed.     Note:    Patient transferred to:Medical/Surgical Unit  Comments: Diagnosis: Venous Insufficiency  Procedure: IV Start  Ordering Physician: Theodore  Location: 603 Bed 2      Vitals: (Last set prior to Anesthesia Care Transfer)              Electronically Signed By: CECIL Joseph CRNA  August 4, 2018  8:35 PM

## 2018-08-05 NOTE — PLAN OF CARE
Discharge    Patient plan to discharge to home via own transportation with daughter around 1500.   Care plan note: Hypertensive, improved after am meds. C/o HA, tylenol given and improved after eating breakfast. +BS, lungs clear, on RA. Renal US done at 0800, result will not be read until Monday as interventional radiologist is not available over the weekend. Pt stable, will arrange PCP appointment for follow up.     Listed belongings gathered and returned to patient. Yes  Care Plan and Patient education resolved: Yes  Prescriptions if needed, hard copies sent with patient  Yes  Home and hospital acquired medications returned to patient: NA  Medication Bin checked and emptied on discharge Yes  Follow up appointment made for patient: No

## 2018-08-05 NOTE — DISCHARGE SUMMARY
[facility]  Discharge Summary        Deborah Higginbotham MRN# [id number]   YOB: 1931 Age: 86 year old     Date of Admission:  8/4/2018  Date of Discharge:  8/5/2018  Admitting Physician:  Fam Diaz MD  Discharge Physician: Fam Diaz MD  Discharging Service: Hospitalist     Primary Provider:  Stu Stanford  Primary Care Physician Phone Number: [phone]         Discharge Diagnoses/Problem Oriented Hospital Course (Providers):    Deborah Higginbotham was admitted on 8/4/2018 by Fam Diaz MD and I would refer you to their history and physical.  The following problems were addressed during her hospitalization:    ASSESSMENT:  Ms. Higginbotham is an 86-year-old with longstanding history of hypertension, worse in the last 1 month, being admitted for hypertensive urgency.       PLAN:   1.  Hypertensive urgency.    - bp improved with addition of hydralazine.  - continue the patient on her low dose Toprol-XL, as were limited by her heart rate which is in the mid 50s.   - continue on olmesartan 40 mg about a week ago we will continue the patient on that.    - patient had an echo recently, which showed a normal heart without any evidence of hypertensive heart disease.    - urinalysis has no proteinuria or active sediment.   - renal Dopplers completed looking for any renal artery stenosis but will not be read until 8/6, can follow up with PCP.   2.  History of large ventral hernia.  According to the daughter, this has grown recently even larger.  They had some concerns whether this may be pressing down the kidney.    - renal ultrasound completed but has yet to be read, follow up with PCP as outpatient.  3.  Hypercholesterolemia.  We will continue the patient on her pravastatin.   4.  Renal insufficiency.   - improved with bp control           Code Status:      Full Code         Important Results:      NAD  Lungs cta  cv rrr  abd soft  Ext no edema         Pending Results:         Unresulted Labs Ordered in the Past 30 Days of this Admission     No orders found for last 61 day(s).               Discharge Instructions and Follow-Up:      Follow-up Appointments     Follow-up and recommended labs and tests        Follow up with primary care provider, Stu Stanford, within 7 days for   hospital follow- up.  No follow up labs or test are needed.                         Discharge Disposition:      Discharged to home         Discharge Medications:        Current Discharge Medication List      START taking these medications    Details   hydrALAZINE (APRESOLINE) 25 MG tablet Take 1 tablet (25 mg) by mouth 3 times daily  Qty: 60 tablet, Refills: 1    Associated Diagnoses: Hypertensive urgency         CONTINUE these medications which have NOT CHANGED    Details   ACETAMINOPHEN PO Take 1,300 mg by mouth every evening 2 x 650 mg tablet      metoprolol succinate (TOPROL-XL) 25 MG 24 hr tablet Take 25 mg by mouth daily       OLMESARTAN MEDOXOMIL PO Take 40 mg by mouth daily       potassium chloride (MICRO-K) 10 MEQ CR capsule Take 20 mEq by mouth daily (2 capsules in the morning)      PRAVASTATIN SODIUM PO Take 40 mg by mouth At Bedtime       TORSEMIDE PO Take 10 mg by mouth daily (2 x 5mg tablets)      VITAMIN D, CHOLECALCIFEROL, PO Take 2,000 Units by mouth daily                   Allergies:       No Known Allergies         Consultations This Hospital Stay:      No consultations were requested during this admission          Discharge Orders for Skilled Facility (from Discharge Orders):        After Care Instructions     Activity       Your activity upon discharge: activity as tolerated            Diet       Follow this diet upon discharge: Orders Placed This Encounter      Regular Diet Adult                           Rehab orders for Skilled Facility (from Discharge Orders):               Discharge Time:      Less than 30 minutes.        Image Results From This Hospital Stay (For Non-EPIC  Providers):        Results for orders placed or performed during the hospital encounter of 05/25/18   Chest XR,  PA & LAT    Narrative    CHEST TWO VIEWS 5/25/2018 7:22 PM     COMPARISON: None.    HISTORY: Cough.    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 The cardiac  silhouette, pulmonary vasculature, lungs and pleural spaces are within  normal limits.      Impression    IMPRESSION: Clear lungs.    RAINE ALICEA MD           Most Recent Lab Results In EPIC (For Non-EPIC Providers):    Most Recent 3 CBC's:  Recent Labs   Lab Test  08/05/18   0826  08/04/18   1528  05/25/18   1830   WBC  5.0  5.8  8.8   HGB  13.7  13.4  12.9   MCV  90  91  91   PLT  186  204  222      Most Recent 3 BMP's:  Recent Labs   Lab Test  08/05/18   0826  08/04/18   1528  05/25/18   1830   NA  138  139  138   POTASSIUM  3.8  3.7  3.5   CHLORIDE  106  103  104   CO2  22  26  27   BUN  15  18  14   CR  0.73  0.93  0.70   ANIONGAP  10  10  7   ARABELLA  9.0  9.1  9.7   GLC  116*  98  100*     Most Recent 3 Troponin's:  Recent Labs   Lab Test  08/04/18   1528   TROPI  <0.015     Most Recent 3 INR's:No lab results found.  Most Recent 2 LFT's:  Recent Labs   Lab Test  05/25/18   1830   AST  17   ALT  18   ALKPHOS  69   BILITOTAL  0.4     Most Recent Cholesterol Panel:No lab results found.  Most Recent 6 Bacteria Isolates From Any Culture (See EPIC Reports for Culture Details):No lab results found.  Most Recent TSH, T4 and HgbA1c:No lab results found.

## 2018-08-05 NOTE — PLAN OF CARE
Problem: Patient Care Overview  Goal: Plan of Care/Patient Progress Review  Outcome: Improving  A&Ox4, up stand by assist. VSS on RA except BP elevated, most recent 175/100. NPO except meds for renal ultrasound today. Nephrology consult today.

## 2018-08-05 NOTE — H&P
Admitted:     08/04/2018      PRIMARY CARE PHYSICIAN:  Dr. Stu Stanford      PRIMARY NEPHROLOGIST:  Dr. Han Falk      CHIEF COMPLAINT:  Resistant hypertension, symptomatic.      HISTORY OF PRESENT ILLNESS:  Deborah Higginbotham is a very pleasant 86-year-old  female who is followed by Dr. Stanford.  She recently saw Dr. Falk from Nephrology for resistant hypertension, and presents to [facility] with hypertensive urgency.      The patient has a longstanding history of hypertension that has been under reasonably good control until about a month ago when her blood pressure started becoming more resistant to treatment.  She was seen by Dr. Falk due to ongoing elevated blood pressures. The patient was started on olmesartan 40 mg and was taken off the Cozaar about a week ago.  The patient was also initiated on torsemide as well.  Over the last week or so her blood pressures have been consistently elevated with systolic blood pressures greater than 200 and diastolic blood pressures greater than 100.  The patient comes in to the Emergency Department today because she is having ongoing headaches, head pressure and weakness.  She denies any fevers or chills, but she is feeling lightheaded.  She came in to [facility] for further assessment.      In the emergency room, the patient was seen by Dr. Jose Luis Guallpa.  The patient's initial blood pressures were quite elevated at 237/117 and 185/105.  Blood work revealed normal electrolytes.  Creatinine was 0.9 with calculated GFR 57.  Troponin negative.  CBC normal. Glucose 98.  Most recent echo from about 3 weeks ago is reviewed.  This showed normal LV structure, function and size with EF of 60%.  RV is also normal in size, structure and function.  No significant valvular disease.  In the Emergency Department, patient received 10 mg of hydralazine, which did bring her blood pressure down.  She still has ongoing headache.  The patient is  being admitted for hypertensive urgency.      PAST MEDICAL HISTORY:   1.  History of breast cancer, status post mastectomy.   2.  Hypertension.   3.  Hypercholesterolemia.   4.  History of large ventral hernia.      PAST SURGICAL HISTORY:     1.  Mastectomy.   2.  Hernia repair.   3.  Orthopedic surgery.      CURRENT MEDICATION LIST:   1.  Toprol-XL 25 mg once a day.   2.  Benicar 40 mg once a day.   3.  Potassium chloride 20 mEq once a day.   4.  Pravachol 40 mg once a day.    5.  Torsemide 10 mg once day.   6.  Vitamin D 2000 units once a day.      ALLERGIES:  NO KNOWN DRUG ALLERGIES.      SOCIAL HISTORY:  , lives in a condo.  Accompanied by her daughter.  She is Full Code.  She has a glass of wine every day.  No tobacco.        FAMILY HISTORY:  Reviewed and negative.      REVIEW OF SYSTEMS:  Ten-point review of systems reviewed and as dictated in the history of present illness.      PHYSICAL EXAMINATION:   VITAL SIGNS:  Temperature 98.7, heart rate 72, respiration 18, blood pressure 185/105, sats 97% on room air.   GENERAL:  The patient is alert and oriented x3, in no distress.   HEENT:  Unremarkable.  Pupils equal.  Sclerae are anicteric.  Mucous membranes are moist.   NECK:  Veins are distended.   LUNGS:  Clear to auscultation.   CARDIOVASCULAR:  S1, S2, regular rate and rhythm.  No murmurs, gallops or rubs noted.   ABDOMEN:  Obese.  She got a large ventral hernia that is soft.  Bowel sounds are normoactive.   EXTREMITIES:  Shows nonpitting edema of her lower extremity.   NEUROLOGIC:  She is able to move all 4 extremities.  Cranial nerves grossly intact.      LABORATORY STUDIES:  See history of present illness.      ASSESSMENT:  Ms. Higginbotham is an 86-year-old with longstanding history of hypertension, worse in the last 1 month, being admitted for hypertensive urgency.      PLAN:   1.  Hypertensive urgency.  We will continue the patient on her low dose Toprol-XL, as were limited by her heart rate which is  in the mid 50s as to decreasing her beta blockers.  She was recently started on olmesartan 40 mg about a week ago we will continue the patient on that.  She has in the past been on Norvasc, but she has lower extremity edema and I would hate to exacerbate that.  She did respond to IV hydralazine in the ED, so we are going to start her on oral hydralazine 25 mg q.i.d. with p.r.n. IV hydralazine for systolic blood pressure greater than 180.  Goal of blood pressure is to keep her systolic less than 180 and diastolic less than 90.  The patient had an echo recently, which showed a normal heart without any evidence of hypertensive heart disease.  We will check a urinalysis for any proteinuria.  We will also check renal Dopplers looking for any renal artery stenosis.   2.  History of large ventral hernia.  According to the daughter, this has grown recently even larger.  They had some concerns whether this may be pressing down the kidney.  We will first look at the renal ultrasound, but hold off on any further imaging.   3.  Hypercholesterolemia.  We will continue the patient on her pravastatin.   4.  Renal insufficiency.  The patient, looking back at the records, has had kidney function ranging from normal to stage III.  I suspect this is a transient number and we do not have a stable kidney function given her out of control blood pressure.  She has seen Nephrology in the past in outpatient clinic and we will certainly consult them so they can help optimize her blood pressure medications and more importantly follow her as an outpatient.   5.  Deep venous thrombosis prophylaxis.  Compression boots.      CODE STATUS:  Full Code.         ELÍAS SPENCE MD             D: 2018   T: 2018   MT: NTS      Name:     TANYA GIBBS   MRN:      [id number]        Account:      [id number]   :      1931        Admitted:     2018                   Document: [id number]       cc: Han Falk MD        Stu Stanford MD

## 2018-08-05 NOTE — PLAN OF CARE
Problem: Patient Care Overview  Goal: Plan of Care/Patient Progress Review  Admission    Patient arrives to room 603-2 via cart from ed.  Care plan note: Pt alert and oriented x 4. BP was 185/105 upon arrival; received scheduled hydralazine; currently 152/82. Other VSS on RA. C/o pain in neck and severe headache. Received PRN 0.3mg dilaudid and scheduled tylenol; which were effective. Up SBA. Tolerated regular diet. Voiding adequately. Edema in BLE. Ventral hernia present. Renal US planned for tomorrow morning; pt to be NPO after midnight. Nephrology following. No BP in L arm. Nursing will continue to monitor.     Inpatient nursing criteria listed below were met:    PCD's Documented: Yes  Skin issues/needs documented :NA  Isolation education started/completed NA  Patient allergies verified with patient: Yes  Verified completion of Ebony Risk Assessment Tool:  Yes  Verified completion of Guardianship screening tool: Yes  Fall Prevention: Care plan updated, Education given and documented Yes  Care Plan initiated: Yes  Home medications documented in belongings flowsheet: Yes  Patient belongings documented in belongings flowsheet: Yes  Reminder note (belongings/ medications) placed in discharge instructions:Yes  Admission profile/ required documentation complete: Yes

## 2018-08-06 LAB — INTERPRETATION ECG - MUSE: NORMAL

## 2018-10-04 ENCOUNTER — HOSPITAL ENCOUNTER (OUTPATIENT)
Facility: CLINIC | Age: 83
Setting detail: OBSERVATION
Discharge: HOME | End: 2018-10-06
Attending: EMERGENCY MEDICINE | Admitting: INTERNAL MEDICINE
Payer: COMMERCIAL

## 2018-10-04 DIAGNOSIS — I10 BENIGN ESSENTIAL HYPERTENSION: ICD-10-CM

## 2018-10-04 DIAGNOSIS — I16.9 HYPERTENSIVE CRISIS: Primary | ICD-10-CM

## 2018-10-04 DIAGNOSIS — R42 LIGHTHEADEDNESS: ICD-10-CM

## 2018-10-04 LAB
ALBUMIN UR-MCNC: NEGATIVE MG/DL
ANION GAP SERPL CALCULATED.3IONS-SCNC: 5 MMOL/L (ref 3–14)
APPEARANCE UR: CLEAR
BASOPHILS # BLD AUTO: 0 10E9/L (ref 0–0.2)
BASOPHILS NFR BLD AUTO: 0.6 %
BILIRUB UR QL STRIP: NEGATIVE
BUN SERPL-MCNC: 19 MG/DL (ref 7–30)
CALCIUM SERPL-MCNC: 9.2 MG/DL (ref 8.5–10.1)
CHLORIDE SERPL-SCNC: 108 MMOL/L (ref 94–109)
CO2 SERPL-SCNC: 29 MMOL/L (ref 20–32)
COLOR UR AUTO: ABNORMAL
CREAT SERPL-MCNC: 0.98 MG/DL (ref 0.52–1.04)
DIFFERENTIAL METHOD BLD: NORMAL
EOSINOPHIL # BLD AUTO: 0.1 10E9/L (ref 0–0.7)
EOSINOPHIL NFR BLD AUTO: 1.3 %
ERYTHROCYTE [DISTWIDTH] IN BLOOD BY AUTOMATED COUNT: 13.3 % (ref 10–15)
GFR SERPL CREATININE-BSD FRML MDRD: 54 ML/MIN/1.7M2
GLUCOSE SERPL-MCNC: 101 MG/DL (ref 70–99)
GLUCOSE UR STRIP-MCNC: NEGATIVE MG/DL
HCT VFR BLD AUTO: 37.4 % (ref 35–47)
HGB BLD-MCNC: 12.4 G/DL (ref 11.7–15.7)
HGB UR QL STRIP: NEGATIVE
IMM GRANULOCYTES # BLD: 0 10E9/L (ref 0–0.4)
IMM GRANULOCYTES NFR BLD: 0.2 %
INTERPRETATION ECG - MUSE: NORMAL
KETONES UR STRIP-MCNC: NEGATIVE MG/DL
LEUKOCYTE ESTERASE UR QL STRIP: NEGATIVE
LYMPHOCYTES # BLD AUTO: 1.6 10E9/L (ref 0.8–5.3)
LYMPHOCYTES NFR BLD AUTO: 29.3 %
MCH RBC QN AUTO: 30 PG (ref 26.5–33)
MCHC RBC AUTO-ENTMCNC: 33.2 G/DL (ref 31.5–36.5)
MCV RBC AUTO: 90 FL (ref 78–100)
MONOCYTES # BLD AUTO: 0.6 10E9/L (ref 0–1.3)
MONOCYTES NFR BLD AUTO: 11.8 %
MUCOUS THREADS #/AREA URNS LPF: PRESENT /LPF
NEUTROPHILS # BLD AUTO: 3.1 10E9/L (ref 1.6–8.3)
NEUTROPHILS NFR BLD AUTO: 56.8 %
NITRATE UR QL: NEGATIVE
NRBC # BLD AUTO: 0 10*3/UL
NRBC BLD AUTO-RTO: 0 /100
NT-PROBNP SERPL-MCNC: 349 PG/ML (ref 0–1800)
PH UR STRIP: 7 PH (ref 5–7)
PLATELET # BLD AUTO: 180 10E9/L (ref 150–450)
POTASSIUM SERPL-SCNC: 3.8 MMOL/L (ref 3.4–5.3)
RBC # BLD AUTO: 4.14 10E12/L (ref 3.8–5.2)
RBC #/AREA URNS AUTO: <1 /HPF (ref 0–2)
SODIUM SERPL-SCNC: 142 MMOL/L (ref 133–144)
SOURCE: ABNORMAL
SP GR UR STRIP: 1.01 (ref 1–1.03)
SQUAMOUS #/AREA URNS AUTO: <1 /HPF (ref 0–1)
TROPONIN I SERPL-MCNC: <0.015 UG/L (ref 0–0.04)
UROBILINOGEN UR STRIP-MCNC: NORMAL MG/DL (ref 0–2)
WBC # BLD AUTO: 5.4 10E9/L (ref 4–11)
WBC #/AREA URNS AUTO: 1 /HPF (ref 0–5)

## 2018-10-04 PROCEDURE — 99285 EMERGENCY DEPT VISIT HI MDM: CPT | Mod: 25

## 2018-10-04 PROCEDURE — 85025 COMPLETE CBC W/AUTO DIFF WBC: CPT | Performed by: EMERGENCY MEDICINE

## 2018-10-04 PROCEDURE — 25000132 ZZH RX MED GY IP 250 OP 250 PS 637: Performed by: EMERGENCY MEDICINE

## 2018-10-04 PROCEDURE — G0378 HOSPITAL OBSERVATION PER HR: HCPCS

## 2018-10-04 PROCEDURE — 99219 ZZC INITIAL OBSERVATION CARE,LEVL II: CPT | Performed by: INTERNAL MEDICINE

## 2018-10-04 PROCEDURE — 80048 BASIC METABOLIC PNL TOTAL CA: CPT | Performed by: EMERGENCY MEDICINE

## 2018-10-04 PROCEDURE — 83880 ASSAY OF NATRIURETIC PEPTIDE: CPT | Performed by: EMERGENCY MEDICINE

## 2018-10-04 PROCEDURE — 96374 THER/PROPH/DIAG INJ IV PUSH: CPT

## 2018-10-04 PROCEDURE — 25000132 ZZH RX MED GY IP 250 OP 250 PS 637: Performed by: INTERNAL MEDICINE

## 2018-10-04 PROCEDURE — 25000128 H RX IP 250 OP 636: Performed by: EMERGENCY MEDICINE

## 2018-10-04 PROCEDURE — 81001 URINALYSIS AUTO W/SCOPE: CPT | Performed by: EMERGENCY MEDICINE

## 2018-10-04 PROCEDURE — 93005 ELECTROCARDIOGRAM TRACING: CPT

## 2018-10-04 PROCEDURE — 84484 ASSAY OF TROPONIN QUANT: CPT | Performed by: EMERGENCY MEDICINE

## 2018-10-04 RX ORDER — ACETAMINOPHEN 325 MG/1
650 TABLET ORAL EVERY 4 HOURS PRN
Status: DISCONTINUED | OUTPATIENT
Start: 2018-10-04 | End: 2018-10-06 | Stop reason: HOSPADM

## 2018-10-04 RX ORDER — HYDRALAZINE HYDROCHLORIDE 25 MG/1
25 TABLET, FILM COATED ORAL ONCE
Status: COMPLETED | OUTPATIENT
Start: 2018-10-04 | End: 2018-10-04

## 2018-10-04 RX ORDER — CLONIDINE HYDROCHLORIDE 0.1 MG/1
0.05 TABLET ORAL 3 TIMES DAILY
Status: ON HOLD | COMMUNITY
End: 2018-10-06

## 2018-10-04 RX ORDER — ONDANSETRON 4 MG/1
4 TABLET, ORALLY DISINTEGRATING ORAL EVERY 6 HOURS PRN
Status: DISCONTINUED | OUTPATIENT
Start: 2018-10-04 | End: 2018-10-06 | Stop reason: HOSPADM

## 2018-10-04 RX ORDER — OLMESARTAN MEDOXOMIL 20 MG/1
40 TABLET ORAL DAILY
Status: DISCONTINUED | OUTPATIENT
Start: 2018-10-05 | End: 2018-10-05 | Stop reason: CLARIF

## 2018-10-04 RX ORDER — CARVEDILOL 6.25 MG/1
6.25 TABLET ORAL 2 TIMES DAILY WITH MEALS
Status: DISCONTINUED | OUTPATIENT
Start: 2018-10-04 | End: 2018-10-06 | Stop reason: HOSPADM

## 2018-10-04 RX ORDER — NALOXONE HYDROCHLORIDE 0.4 MG/ML
.1-.4 INJECTION, SOLUTION INTRAMUSCULAR; INTRAVENOUS; SUBCUTANEOUS
Status: DISCONTINUED | OUTPATIENT
Start: 2018-10-04 | End: 2018-10-06 | Stop reason: HOSPADM

## 2018-10-04 RX ORDER — ACETAMINOPHEN 650 MG/1
650 SUPPOSITORY RECTAL EVERY 4 HOURS PRN
Status: DISCONTINUED | OUTPATIENT
Start: 2018-10-04 | End: 2018-10-06 | Stop reason: HOSPADM

## 2018-10-04 RX ORDER — POTASSIUM CHLORIDE 750 MG/1
10 TABLET, EXTENDED RELEASE ORAL 2 TIMES DAILY
Status: DISCONTINUED | OUTPATIENT
Start: 2018-10-05 | End: 2018-10-06 | Stop reason: HOSPADM

## 2018-10-04 RX ORDER — HYDRALAZINE HYDROCHLORIDE 20 MG/ML
20 INJECTION INTRAMUSCULAR; INTRAVENOUS ONCE
Status: COMPLETED | OUTPATIENT
Start: 2018-10-04 | End: 2018-10-04

## 2018-10-04 RX ORDER — HYDRALAZINE HYDROCHLORIDE 20 MG/ML
10 INJECTION INTRAMUSCULAR; INTRAVENOUS EVERY 4 HOURS PRN
Status: DISCONTINUED | OUTPATIENT
Start: 2018-10-04 | End: 2018-10-06 | Stop reason: HOSPADM

## 2018-10-04 RX ORDER — TORSEMIDE 10 MG/1
10 TABLET ORAL DAILY
Status: DISCONTINUED | OUTPATIENT
Start: 2018-10-05 | End: 2018-10-06 | Stop reason: HOSPADM

## 2018-10-04 RX ORDER — CARVEDILOL 6.25 MG/1
6.25 TABLET ORAL 2 TIMES DAILY WITH MEALS
COMMUNITY
End: 2021-07-22

## 2018-10-04 RX ORDER — ONDANSETRON 2 MG/ML
4 INJECTION INTRAMUSCULAR; INTRAVENOUS EVERY 6 HOURS PRN
Status: DISCONTINUED | OUTPATIENT
Start: 2018-10-04 | End: 2018-10-06 | Stop reason: HOSPADM

## 2018-10-04 RX ORDER — HYDRALAZINE HYDROCHLORIDE 25 MG/1
25 TABLET, FILM COATED ORAL 2 TIMES DAILY
Status: DISCONTINUED | OUTPATIENT
Start: 2018-10-04 | End: 2018-10-06

## 2018-10-04 RX ADMIN — ACETAMINOPHEN 650 MG: 325 TABLET, FILM COATED ORAL at 22:00

## 2018-10-04 RX ADMIN — HYDRALAZINE HYDROCHLORIDE 20 MG: 20 INJECTION INTRAMUSCULAR; INTRAVENOUS at 16:55

## 2018-10-04 RX ADMIN — CARVEDILOL 6.25 MG: 6.25 TABLET, FILM COATED ORAL at 21:27

## 2018-10-04 RX ADMIN — HYDRALAZINE HYDROCHLORIDE 25 MG: 25 TABLET ORAL at 21:27

## 2018-10-04 RX ADMIN — HYDRALAZINE HYDROCHLORIDE 25 MG: 25 TABLET ORAL at 17:11

## 2018-10-04 ASSESSMENT — ENCOUNTER SYMPTOMS
SHORTNESS OF BREATH: 1
ABDOMINAL PAIN: 0

## 2018-10-04 ASSESSMENT — PAIN DESCRIPTION - DESCRIPTORS: DESCRIPTORS: HEADACHE

## 2018-10-04 NOTE — IP AVS SNAPSHOT
MRN:[id number]                      After Visit Summary   10/4/2018    Deborah Higginbotham    MRN: [id number]           Thank you!     Thank you for choosing [facility] for your care. Our goal is always to provide you with excellent care. Hearing back from our patients is one way we can continue to improve our services. Please take a few minutes to complete the written survey that you may receive in the mail after you visit with us. Thank you!        Patient Information     Date Of Birth          9/7/1931        About your hospital stay     You were admitted on:  October 4, 2018 You last received care in the:  [facility] Observation Unit    You were discharged on:  October 6, 2018        Reason for your hospital stay       You were hospitalized for further evaluation and treatment of uncontrolled blood pressures.                  Who to Call     For medical emergencies, please call 911.  For non-urgent questions about your medical care, please call your primary care provider or clinic, [phone]          Attending Provider     Provider Specialty    Trierweiler, Chad A, MD Emergency Medicine    Weston, Alex Peraza MD Internal Medicine       Primary Care Provider Office Phone # Fax #    Stu Stanford [phone]      After Care Instructions     Activity       Your activity upon discharge: activity as tolerated            Diet       Follow this diet upon discharge: Resume home diet. Limit salt and caffeine intake.            Discharge Instructions       1) Follow-up with your primary care provider in the next week to discuss hospitalization, please record your blood pressures 1-2 times/day and bring them in to primary care provider follow-up   2) Stop taking clonidine   3) Start hydralazine 25 mg by mouth three times daily, Amlodipine 5 mg by mouth daily  4) Continue remainder of blood pressure meds (Coreg, Olmesartan, Torsemide)  5) Return to the ED with worsening symptoms    "               Follow-up Appointments     Follow-up and recommended labs and tests        Follow up with primary care provider, Stu Stanford, within 7 days for hospital follow- up.  The following labs/tests are recommended: BMP.                  Further instructions from your care team       Pt belonging: Clothing    Pending Results     No orders found from 10/2/2018 to 10/5/2018.            Statement of Approval     Ordered          10/06/18 1629  I have reviewed and agree with all the recommendations and orders detailed in this document.  EFFECTIVE NOW     Approved and electronically signed by:  Aleena Holloway PA-C             Admission Information     Date & Time Provider Department Dept. Phone    10/4/2018 Alex Weston MD [facility] [phone]      Your Vitals Were     Blood Pressure Pulse Temperature Respirations Height Weight    163/78 (BP Location: Right arm) 68 96.6  F (35.9  C) (Oral) 16 1.575 m (5' 2\") 83 kg (183 lb)    Pulse Oximetry BMI (Body Mass Index)                96% 33.47 kg/m2          Care EveryWhere ID     This is your Care EveryWhere ID. This could be used by other organizations to access your [facility] medical records  [id number]        Equal Access to Services     TIFFANIE ROBERSON AH: Hadii ronnell owen hadluiso Soroseanna, waaxda luqadaha, qaybta kaalmada adeegyada, gloria villa. So [facility] [phone].    ATENCIÓN: Si habla español, tiene a cook disposición servicios gratuitos de asistencia lingüística. Tony al [phone].    We comply with applicable federal civil rights laws and [place] laws. We do not discriminate on the basis of race, color, national origin, age, disability, sex, sexual orientation, or gender identity.               Review of your medicines      START taking        Dose / Directions    amLODIPine 5 MG tablet   Commonly known as:  NORVASC        Dose:  5 mg   Take 1 tablet (5 mg) by mouth every " evening   Quantity:  30 tablet   Refills:  0       hydrALAZINE 25 MG tablet   Commonly known as:  APRESOLINE        Dose:  25 mg   Take 1 tablet (25 mg) by mouth 3 times daily   Quantity:  90 tablet   Refills:  0         CONTINUE these medicines which have NOT CHANGED        Dose / Directions    ACETAMINOPHEN PO        Dose:  1000 mg   Take 1,000 mg by mouth every evening as needed 2 x 650 mg tablet   Refills:  0       carvedilol 6.25 MG tablet   Commonly known as:  COREG        Dose:  6.25 mg   Take 6.25 mg by mouth 2 times daily (with meals)   Refills:  0       OLMESARTAN MEDOXOMIL PO        Dose:  40 mg   Take 40 mg by mouth daily   Refills:  0       potassium chloride 10 MEQ CR capsule   Commonly known as:  MICRO-K        Dose:  10 mEq   Take 10 mEq by mouth 2 times daily Morning & Noon.   Refills:  0       PRAVASTATIN SODIUM PO        Dose:  40 mg   Take 40 mg by mouth At Bedtime   Refills:  0       TORSEMIDE PO        Dose:  10 mg   Take 10 mg by mouth daily   Refills:  0       VITAMIN D (CHOLECALCIFEROL) PO        Dose:  2000 Units   Take 2,000 Units by mouth daily   Refills:  0         STOP taking     cloNIDine 0.1 MG tablet   Commonly known as:  CATAPRES                Where to get your medicines      These medications were sent to [facility]/pharmacy #[id number] - [place], [place] - [address] [facility]  [address] [place] [id number]     Phone:  [phone]     amLODIPine 5 MG tablet    hydrALAZINE 25 MG tablet                Protect others around you: Learn how to safely use, store and throw away your medicines at www.disposemymeds.org.             Medication List: This is a list of all your medications and when to take them. Check marks below indicate your daily home schedule. Keep this list as a reference.      Medications           Morning Afternoon Evening Bedtime As Needed    ACETAMINOPHEN PO   Take 1,000 mg by mouth every evening as needed 2 x 650 mg tablet   Last time this was given:  650 mg on 10/5/2018  3:53 PM                                    amLODIPine 5 MG tablet   Commonly known as:  NORVASC   Take 1 tablet (5 mg) by mouth every evening   Last time this was given:  5 mg on 10/6/2018  3:06 PM                                   carvedilol 6.25 MG tablet   Commonly known as:  COREG   Take 6.25 mg by mouth 2 times daily (with meals)   Last time this was given:  6.25 mg on 10/6/2018  8:06 AM                                      hydrALAZINE 25 MG tablet   Commonly known as:  APRESOLINE   Take 1 tablet (25 mg) by mouth 3 times daily   Last time this was given:  25 mg on 10/6/2018  1:08 PM                                         OLMESARTAN MEDOXOMIL PO   Take 40 mg by mouth daily                                   potassium chloride 10 MEQ CR capsule   Commonly known as:  MICRO-K   Take 10 mEq by mouth 2 times daily Morning & Noon.                                      PRAVASTATIN SODIUM PO   Take 40 mg by mouth At Bedtime                                   TORSEMIDE PO   Take 10 mg by mouth daily   Last time this was given:  10 mg on 10/6/2018  8:05 AM                                   VITAMIN D (CHOLECALCIFEROL) PO   Take 2,000 Units by mouth daily

## 2018-10-04 NOTE — IP AVS SNAPSHOT
[facility]    [address] [place] [id number]    Phone:  [phone]                                       After Visit Summary   10/4/2018    Deborah Higginbotham    MRN: [id number]           After Visit Summary Signature Page     I have received my discharge instructions, and my questions have been answered. I have discussed any challenges I see with this plan with the nurse or doctor.    ..........................................................................................................................................  Patient/Patient Representative Signature      ..........................................................................................................................................  Patient Representative Print Name and Relationship to Patient    ..................................................               ................................................  Date                                   Time    ..........................................................................................................................................  Reviewed by Signature/Title    ...................................................              ..............................................  Date                                               Time          22EPIC Rev 08/18

## 2018-10-04 NOTE — ED PROVIDER NOTES
History     Chief Complaint:  Shortness of Breath    HPI   Deborah Higginbotham is a 87 year old female, with history of cancer, high cholesterol, and hypertension, who presents for evaluation of shortness of breath. She has additional complaints of water retention, specifically bilateral swollen ankles. She was admitted to the hospital overnight 8/418-8/5/18 for hypertensive urgency with systolic BPs in the 230's. Hydralazine was added. She has been seeing nephrology since discharge. A week ago, she had clonidine added to her regimen. 6 days ago on Friday, she became short of breath, noticed more edema in her legs, and has not been able to walk normally due to the edema in her feet. She saw nephrology 3 days ago and was instructed to cut her clonidine dose in half, but instead took half of her Carvedilol dose. She then developed dry mouth, and increased shortness of breath and edema. She is now hypertensive to the 190's systolic, afebrile, and bradycardic to the 50's. Denies abdominal pain.     Allergies:  No Known Drug Allergies      Medications:    Acetaminophen  Carvedilol  Clonidine  Olmesartan medoxomil  Hydralazine  Potassium chloride  Pravastatin  Torsemide     Past Medical History:    Cancer  High cholesterol  Hypertension    Past Surgical History:    Breast surgery  Hernia repair  Orthopedic surgery    Family History:    No past pertinent family history.     Social History:  Relationship status:   Tobacco use: Former quit in 1970.  Alcohol use: Yes.   Marital Status:   [5]     Review of Systems   Respiratory: Positive for shortness of breath.    Cardiovascular: Positive for leg swelling.   Gastrointestinal: Negative for abdominal pain.   All other systems reviewed and are negative.    Physical Exam     Patient Vitals for the past 24 hrs:   BP Temp Temp src Pulse Heart Rate Resp SpO2 Height Weight   10/04/18 1744 163/87 - - - - - 97 % - -   10/04/18 1700 186/87 - - - 53 9 97 % - -   10/04/18  "1630 (!) 217/138 - - - 53 10 97 % - -   10/04/18 1600 - - - - (!) 49 27 96 % - -   10/04/18 1545 - - - - - - 97 % - -   10/04/18 1530 (!) 189/102 - - - 56 15 96 % - -   10/04/18 1515 - - - - 53 18 95 % - -   10/04/18 1500 - - - - 54 15 97 % - -   10/04/18 1445 - - - - 54 15 96 % - -   10/04/18 1430 (!) 196/99 - - - 56 16 97 % - -   10/04/18 1419 - - - - 57 15 97 % - -   10/04/18 1315 185/81 97.6  F (36.4  C) Temporal 63 - 18 95 % 1.575 m (5' 2\") 83 kg (183 lb)        Physical Exam  Eye:  Pupils are equal, round, and reactive.  Extraocular movements intact.    ENT:  No rhinorrhea.  Moist mucus membranes.  Normal tongue and tonsil.    Cardiac:  Regular rate and rhythm.  No murmurs, gallops, or rubs.    Pulmonary:  Clear to auscultation bilaterally.  No wheezes, rales, or rhonchi.    Abdomen:  Positive bowel sounds.  Abdomen is soft and non-distended, without focal tenderness.    Musculoskeletal:  Normal movement of all extremities without evidence for deficit.1+ edema bilaterally and symmetrically.    Skin:  Warm and dry without rashes.    Neurologic:  Non-focal exam without asymmetric weakness or numbness.     Psychiatric:  Normal affect with appropriate interaction with examiner.     Emergency Department Course     ECG:  ECG taken at 1607, ECG read at 1609  Sinus bradycardia  Left axis deviation   Nonspecific T wave abnormality   No significant change compared to EKG dated 8/4/18  Abnormal ECG  Rate 49 bpm. CT interval 192. QRS duration 96. QT/QTc 456/411. P-R-T axes 42  -42  1.     Laboratory:  Laboratory findings were communicated with the patient who voiced understanding of the findings.  CBC: AWNL (WBC 5.4, HGB 12.4, )  BMP: Glucose: 101(H), GFR: 54(L) o/w WNL (Creatinine 0.98)  Troponin (Collected 1550): <0.015  BNP: 349    UA with Microscopic: Mucous: present(A) o/w WNL     Interventions:  1566 Hydralazine injection 20 mg IV  1711 Hydralazine tablet 25 mg Oral     Emergency Department Course:  Nursing " notes and vitals reviewed.  I performed an exam of the patient as documented above.   IV was inserted and blood was drawn for laboratory testing, results above.  The patient provided a urine sample here in the emergency department. This was sent for laboratory testing, findings above.   EKG obtained in the ED, see results above.      1619: I spoke with the patient.   1748: I rechecked the patient.   1750: I spoke with Dr. Weston who accepted the patient for admission.     I discussed the treatment plan with the patient. They expressed understanding of this plan and consented to admission. I discussed the patient with Dr. Weston, who will admit the patient to a monitored bed for further evaluation and treatment.     I personally reviewed the laboratory results with the patient and answered all related questions prior to admission.    Impression & Plan      Medical Decision Making:  This 87-year-old woman with labile blood pressures presents to us with concerns of having high blood pressure and bradycardia today.  I invite the reader to review her admission 6 weeks ago where she was brought in for hypertensive emergency.  She was started on hydralazine to continue with her beta-blocker and her angiotensin receptor blocker.  She seemed to be stable, though family notes that she continued to have labile blood pressures at home.  She has followed up with nephrology several times since then and has had her medications adjusted, specifically stopping her hydralazine, initiating clonidine, and changing her beta-blocker from metoprolol to carvedilol.  She has been concerned that her blood pressure seems to be elevated more often than not and that her heart rate has now slowed into the 50s.  She also describes feeling lightheaded at times, though she notes that she had a very good day yesterday, able to drive a to a card game and ambulate through her apartment and other facilities without significant difficulty.  She  did describe feeling more lightheaded today and she was very alarmed when she checked her blood pressure and found it to be elevated, prompting her visit here.    The patient's physical exam is generally unremarkable.  Her blood pressure is elevated, but otherwise the rest of her vitals are normal.  She has 1-2+ pitting edema bilaterally and symmetrically which is not far off of her baseline.  She had no significant symptoms while at rest.  I elected to pursue an evaluation for hypertensive emergency and the studies are all essentially unremarkable.  EKG is unchanged.  To deal with her blood pressure, I reinitiated hydralazine, giving her 1 dose IV and then 1 dose orally to start to bring this down with the hopes of then discharging her home on as needed hydralazine.  However, when I then ambulated the patient, she noted that she now felt very lightheaded and unsteady on her feet and she did not feel comfortable being discharged.  Considering that she lives completely independently and feels unsafe at home, she is appropriate for an observation admission.  Therefore, I spoke with the admitting hospitalist who will admit the patient for continued blood pressure management, physical therapy evaluation, and determination of other needs.  The patient's questions are answered as of the daughters and they are comfortable with the plan for admission.    Diagnosis:    ICD-10-CM    1. Benign essential hypertension I10    2. Lightheadedness R42       Disposition:   Admitted to [facility] Diagnoses: None     Scribe Disclosure:  I, Sultana Fontana, am serving as a scribe at 2:19 PM on 10/4/2018 to document services personally performed by Trierweiler, Chad A, MD, based on my observations and the provider's statements to me.     Sultana Fontana  10/4/2018    EMERGENCY DEPARTMENT       Trierweiler, Chad A, MD  10/05/18 5848

## 2018-10-04 NOTE — ED NOTES
[facility]  ED Nurse Handoff Report    ED Chief complaint: Shortness of Breath (Pt having trouble with water retention  ( bilateral swollen ankles) , HTN , Sob )      ED Diagnosis:   Final diagnoses:   Benign essential hypertension   Lightheadedness       Code Status: Full Code    Allergies: No Known Allergies    Activity level - Baseline/Home:  Independent    Activity Level - Current:   Stand with Assist     Needed?: No    Isolation: No  Infection: Not Applicable  Bariatric?: No    Vital Signs:   Vitals:    10/04/18 1730 10/04/18 1744 10/04/18 1745 10/04/18 1800   BP: 158/77 163/87  154/86   Pulse:       Resp: 18 18 17   Temp:       TempSrc:       SpO2: 98% 97% 97% 96%   Weight:       Height:           Cardiac Rhythm: ,        Pain level: 0-10 Pain Scale: 0    Is this patient confused?: No   Collin - Suicide Severity Rating Scale Completed?  Yes  If yes, what color did the patient score?  White    Patient Report: Initial Complaint: Deborah Higginbotham is a 87 year old female, with history of cancer, high cholesterol, and hypertension, who presents for evaluation of shortness of breath. She has additional complaints of water retention, specifically bilateral swollen ankles. She was admitted to the hospital overnight 8/418-8/5/18 for hypertensive urgency with systolic BPs in the 230's. Hydralazine was added. She has been seeing nephrology since discharge. A week ago, she had clonidine added to her regimen. 6 days ago on Friday, she became short of breath, noticed more edema in her legs, and has not been able to walk normally due to the edema in her feet. She saw nephrology 3 days ago and was instructed to cut her clonidine dose in half, but instead took half of her Carvedilol dose. She then developed dry mouth, and increased shortness of breath and edema. She is now hypertensive to the 190's systolic, afebrile, and bradycardic to the 50's  Focused Assessment: A/O x4, SOB with activity,  unsteady on her feet when walking, Lungs clear, no report of abdominal pain, no nausea, denies chest pain, NSR on monitor rate 60's  Tests Performed: labs, ekg,   Abnormal Results:   Results for orders placed or performed during the hospital encounter of 10/04/18 (from the past 24 hour(s))   CBC with platelets differential   Result Value Ref Range    WBC 5.4 4.0 - 11.0 10e9/L    RBC Count 4.14 3.8 - 5.2 10e12/L    Hemoglobin 12.4 11.7 - 15.7 g/dL    Hematocrit 37.4 35.0 - 47.0 %    MCV 90 78 - 100 fl    MCH 30.0 26.5 - 33.0 pg    MCHC 33.2 31.5 - 36.5 g/dL    RDW 13.3 10.0 - 15.0 %    Platelet Count 180 150 - 450 10e9/L    Diff Method Automated Method     % Neutrophils 56.8 %    % Lymphocytes 29.3 %    % Monocytes 11.8 %    % Eosinophils 1.3 %    % Basophils 0.6 %    % Immature Granulocytes 0.2 %    Nucleated RBCs 0 0 /100    Absolute Neutrophil 3.1 1.6 - 8.3 10e9/L    Absolute Lymphocytes 1.6 0.8 - 5.3 10e9/L    Absolute Monocytes 0.6 0.0 - 1.3 10e9/L    Absolute Eosinophils 0.1 0.0 - 0.7 10e9/L    Absolute Basophils 0.0 0.0 - 0.2 10e9/L    Abs Immature Granulocytes 0.0 0 - 0.4 10e9/L    Absolute Nucleated RBC 0.0    Basic metabolic panel   Result Value Ref Range    Sodium 142 133 - 144 mmol/L    Potassium 3.8 3.4 - 5.3 mmol/L    Chloride 108 94 - 109 mmol/L    Carbon Dioxide 29 20 - 32 mmol/L    Anion Gap 5 3 - 14 mmol/L    Glucose 101 (H) 70 - 99 mg/dL    Urea Nitrogen 19 7 - 30 mg/dL    Creatinine 0.98 0.52 - 1.04 mg/dL    GFR Estimate 54 (L) >60 mL/min/1.7m2    GFR Estimate If Black 65 >60 mL/min/1.7m2    Calcium 9.2 8.5 - 10.1 mg/dL   Troponin I   Result Value Ref Range    Troponin I ES <0.015 0.000 - 0.045 ug/L   Nt probnp inpatient (BNP)   Result Value Ref Range    N-Terminal Pro BNP Inpatient 349 0 - 1800 pg/mL   UA with Microscopic   Result Value Ref Range    Color Urine Light Yellow     Appearance Urine Clear     Glucose Urine Negative NEG^Negative mg/dL    Bilirubin Urine Negative NEG^Negative    Ketones  Urine Negative NEG^Negative mg/dL    Specific Gravity Urine 1.009 1.003 - 1.035    Blood Urine Negative NEG^Negative    pH Urine 7.0 5.0 - 7.0 pH    Protein Albumin Urine Negative NEG^Negative mg/dL    Urobilinogen mg/dL Normal 0.0 - 2.0 mg/dL    Nitrite Urine Negative NEG^Negative    Leukocyte Esterase Urine Negative NEG^Negative    Source Midstream Urine     WBC Urine 1 0 - 5 /HPF    RBC Urine <1 0 - 2 /HPF    Squamous Epithelial /HPF Urine <1 0 - 1 /HPF    Mucous Urine Present (A) NEG^Negative /LPF   EKG 12-lead, tracing only   Result Value Ref Range    Interpretation ECG Click View Image link to view waveform and result      Treatments provided: hydralazine IV and PO    Family Comments: daughter at bedside    OBS brochure/video discussed/provided to patient/family: Yes              Name of person given brochure if not patient:               Relationship to patient:     ED Medications:   Medications   sodium chloride (PF) 0.9% PF flush 3 mL (not administered)   sodium chloride (PF) 0.9% PF flush 3 mL (not administered)   hydrALAZINE (APRESOLINE) injection 20 mg (20 mg Intravenous Given 10/4/18 1655)   hydrALAZINE (APRESOLINE) tablet 25 mg (25 mg Oral Given 10/4/18 1711)       Drips infusing?:  No    For the majority of the shift this patient was Green.   Interventions performed were NA.    Severe Sepsis OR Septic Shock Diagnosis Present: No    To be done/followed up on inpatient unit:  see in-patient orders    ED NURSE PHONE NUMBER: [phone]

## 2018-10-04 NOTE — PHARMACY-ADMISSION MEDICATION HISTORY
Admission medication history interview status for the 10/4/2018  admission is complete. See EPIC admission navigator for prior to admission medications     Medication history source reliability:Good    Actions taken by pharmacist (provider contacted, etc):Verified all medications with patient's bottles that she brought from home.      Additional medication history information not noted on PTA med list :  1) Patient was supposed to cut clonidine dose to 0.05mg three times daily (from 0.1mg three times daily). Patient was confused between carvedilol & clonidine and cut carvedilol dose in half (to 3.125mg and was taking three times daily) and was taking clonidine 0.1mg twice daily. This change was just started yesterday evening.     Medication reconciliation/reorder completed by provider prior to medication history? No    Time spent in this activity: 20 minutes    Prior to Admission medications    Medication Sig Last Dose Taking? Auth Provider   ACETAMINOPHEN PO Take 1,000 mg by mouth every evening as needed 2 x 650 mg tablet  prn med Yes Unknown, Entered By History   carvedilol (COREG) 6.25 MG tablet Take 6.25 mg by mouth 2 times daily (with meals) 10/4/2018 at am + 1200 - total of 6.25mg Yes Unknown, Entered By History   cloNIDine (CATAPRES) 0.1 MG tablet Take 0.05 mg by mouth 3 times daily 10/4/2018 at am x 0.1mg Yes Unknown, Entered By History   OLMESARTAN MEDOXOMIL PO Take 40 mg by mouth daily  10/4/2018 at am Yes Reported, Patient   potassium chloride (MICRO-K) 10 MEQ CR capsule Take 10 mEq by mouth 2 times daily Morning & Noon. 10/4/2018 at x 2 doses Yes Reported, Patient   PRAVASTATIN SODIUM PO Take 40 mg by mouth At Bedtime  10/3/2018 at pm Yes Reported, Patient   TORSEMIDE PO Take 10 mg by mouth daily  10/4/2018 at am Yes Reported, Patient   VITAMIN D, CHOLECALCIFEROL, PO Take 2,000 Units by mouth daily  10/4/2018 at am Yes Unknown, Entered By History

## 2018-10-05 ENCOUNTER — APPOINTMENT (OUTPATIENT)
Dept: PHYSICAL THERAPY | Facility: CLINIC | Age: 83
End: 2018-10-05
Attending: INTERNAL MEDICINE
Payer: COMMERCIAL

## 2018-10-05 PROCEDURE — 25000132 ZZH RX MED GY IP 250 OP 250 PS 637: Performed by: INTERNAL MEDICINE

## 2018-10-05 PROCEDURE — 99225 ZZC SUBSEQUENT OBSERVATION CARE,LEVEL II: CPT | Performed by: PHYSICIAN ASSISTANT

## 2018-10-05 PROCEDURE — 97161 PT EVAL LOW COMPLEX 20 MIN: CPT | Mod: GP

## 2018-10-05 PROCEDURE — 40000193 ZZH STATISTIC PT WARD VISIT

## 2018-10-05 PROCEDURE — G0378 HOSPITAL OBSERVATION PER HR: HCPCS

## 2018-10-05 RX ORDER — AMLODIPINE BESYLATE 5 MG/1
5 TABLET ORAL DAILY
Status: DISCONTINUED | OUTPATIENT
Start: 2018-10-05 | End: 2018-10-05

## 2018-10-05 RX ORDER — LOSARTAN POTASSIUM 100 MG/1
100 TABLET ORAL DAILY
Status: DISCONTINUED | OUTPATIENT
Start: 2018-10-05 | End: 2018-10-06 | Stop reason: HOSPADM

## 2018-10-05 RX ADMIN — ACETAMINOPHEN 650 MG: 325 TABLET, FILM COATED ORAL at 07:41

## 2018-10-05 RX ADMIN — TORSEMIDE 10 MG: 10 TABLET ORAL at 07:42

## 2018-10-05 RX ADMIN — CARVEDILOL 6.25 MG: 6.25 TABLET, FILM COATED ORAL at 07:42

## 2018-10-05 RX ADMIN — HYDRALAZINE HYDROCHLORIDE 25 MG: 25 TABLET ORAL at 07:42

## 2018-10-05 RX ADMIN — POTASSIUM CHLORIDE 10 MEQ: 750 TABLET, EXTENDED RELEASE ORAL at 07:42

## 2018-10-05 RX ADMIN — HYDRALAZINE HYDROCHLORIDE 25 MG: 25 TABLET ORAL at 20:49

## 2018-10-05 RX ADMIN — ACETAMINOPHEN 650 MG: 325 TABLET, FILM COATED ORAL at 15:53

## 2018-10-05 RX ADMIN — POTASSIUM CHLORIDE 10 MEQ: 750 TABLET, EXTENDED RELEASE ORAL at 12:21

## 2018-10-05 RX ADMIN — LOSARTAN POTASSIUM 100 MG: 100 TABLET, FILM COATED ORAL at 08:58

## 2018-10-05 RX ADMIN — CARVEDILOL 6.25 MG: 6.25 TABLET, FILM COATED ORAL at 18:43

## 2018-10-05 NOTE — PROGRESS NOTES
RECEIVING UNIT ED HANDOFF REVIEW    ED Nurse Handoff Report was reviewed by: Jaskaran Arechiga on October 4, 2018 at 7:42 PM

## 2018-10-05 NOTE — H&P
[facility]    History and Physical  Hospitalist       Date of Admission:  10/4/2018  Date of Service (when I saw the patient): 10/04/18    Assessment & Plan   Deborah Higginbotham is a 87 year old female who presents with hypertension    Hypertension, severe  Bradycardia, with a resting heart rate in the 50s  Mrs. Higginbotham reports having a long-standing history of hypertension.  She been doing well until an episode of hyponatremia in May 2018.  Her hydrochlorothiazide discontinued at that time.  Subsequent to that, she had worsening of her blood pressure.  She had hypertension severe headache in August 2018 prompting hospitalization.  At that time hydralazine was started she was discharged home.  She reports having followed up with nephrology with some medication adjustments.  Most recently she states that clonidine was added to 0.1 mg twice daily.  She has not been tolerating the medication.  Her heart rate in the 50s is prohibitive of increasing her beta-blocker.  -No changes in her EKG compared with previous.  -Renal ultrasound done in August 2018 without evidence of renal artery stenosis  -Discontinue clonidine  -We will restart hydralazine at 25 mg twice daily.  May need to increase to 50 mg twice daily.  Ideally would be 3 times daily dosed; however, this may cause issues with adherence  -Continue her home losartan and carvedilol at 40 mg daily and 6.25 mg twice daily, respectively  -Continue her torsemide 10 mg daily    Hyperlipidemia  Hold her statin while she is under observation status    Constipation  Milk of magnesia as needed    Pain plan: # Pain Assessment:  Current Pain Score 10/4/2018   Patient currently in pain? yes   Pain score (0-10) 7   Pain location Head   Pain descriptors Headache   Deborah feng pain level was assessed and she currently denies pain.      DVT Prophylaxis: Low Risk/Ambulatory with no VTE prophylaxis indicated  Code Status: Full Code    Disposition: Expected  discharge on 10/6    Alex Weston MD  [phone] (P)  Text Page     Primary Care Physician   Dr. Stu Stanford    Chief Complaint   hypertension    History is obtained from the patient and medical records    History of Present Illness   Deborah Higginbotham is a 87 year old female who presents with hypertension.  Patient has known long-standing hypertension.  She had been adequately controlled until May 2018 when she had hyponatremia.  At that time her hydrochlorothiazide was discontinued.  Since then she had issues with hypertension, resulting in hospitalization in August 2018 for headache and severe hypertension with systolics in the 230s  At that time hydralazine was added.  She followed up with nephrology, seeing Dr. Falk.  She states that she has had subsequent visit with nephrology with some adjustments to her pain medications.  Most notably her metoprolol was changed to carvedilol and her hydralazine was replaced with clonidine.  She recently started the clonidine and is been having some issues with lower heart rates at home, some worsening lower extremity edema and dry mouth.  She denies any chest pain or shortness of breath.  She denies headache or any neurologic changes.  Denies nausea, vomiting, constipation or diarrhea.  Presentation to the emergency department tonight she was doing okay.  She states she was told to come here by nephrology clinic.  Her highest systolic in the emergency department was 217 but improved to 154 with IV hydralazine.    Past Medical History    I have reviewed this patient's medical history and updated it with pertinent information if needed.   Past Medical History:   Diagnosis Date     Cancer (H)     breast     High cholesterol      Hypertension        Past Surgical History   I have reviewed this patient's surgical history and updated it with pertinent information if needed.  Past Surgical History:   Procedure Laterality Date     BREAST SURGERY       HERNIA REPAIR        ORTHOPEDIC SURGERY         Prior to Admission Medications   Prior to Admission Medications   Prescriptions Last Dose Informant Patient Reported? Taking?   ACETAMINOPHEN PO prn med Self Yes Yes   Sig: Take 1,000 mg by mouth every evening as needed 2 x 650 mg tablet    OLMESARTAN MEDOXOMIL PO 10/4/2018 at am Pharmacy Yes Yes   Sig: Take 40 mg by mouth daily    PRAVASTATIN SODIUM PO 10/3/2018 at pm Pharmacy Yes Yes   Sig: Take 40 mg by mouth At Bedtime    TORSEMIDE PO 10/4/2018 at am Pharmacy Yes Yes   Sig: Take 10 mg by mouth daily    VITAMIN D, CHOLECALCIFEROL, PO 10/4/2018 at am Self Yes Yes   Sig: Take 2,000 Units by mouth daily    carvedilol (COREG) 6.25 MG tablet 10/4/2018 at am + 1200 - total of 6.25mg  Yes Yes   Sig: Take 6.25 mg by mouth 2 times daily (with meals)   cloNIDine (CATAPRES) 0.1 MG tablet 10/4/2018 at am x 0.1mg  Yes Yes   Sig: Take 0.05 mg by mouth 3 times daily   potassium chloride (MICRO-K) 10 MEQ CR capsule 10/4/2018 at x 2 doses Pharmacy Yes Yes   Sig: Take 10 mEq by mouth 2 times daily Morning & Noon.      Facility-Administered Medications: None     Allergies   No Known Allergies    Social History   I have reviewed this patient's social history and updated it with pertinent information if needed. Deborah Higginbotham  reports that she has quit smoking. She has never used smokeless tobacco. She reports that she does not use illicit drugs.    Family History   I have reviewed this patient's family history and updated it with pertinent information if needed.   Family history is reviewed with the patient and is noncontributory    Review of Systems   The 10 point Review of Systems is negative other than noted in the HPI or here.     Physical Exam   Temp: 96.4  F (35.8  C) Temp src: Oral BP: 156/81 Pulse: 66 Heart Rate: 66 Resp: 16 SpO2: 97 % O2 Device: None (Room air)    Vital Signs with Ranges  183 lbs 0 oz    Constitutional: alert, oriented and in no acute distress  Eyes: EOMI, PERRL  HEENT: OP  clear  Respiratory: CTA B without w/c  Cardiovascular: RRR without m/r/g.  She has trace peripheral edema  GI: soft, nontender, nondistended, no HSM  Lymph/Hematologic: no cervical LAD  Genitourinary: deferred  Skin: no rashes or lesions grossly  Musculoskeletal: no deformities or arthritis  Neurologic: CN II-XII, OLSEN, sensation grossly intact  Psychiatric: mood and affect wnl    Data   Data reviewed today:  I personally reviewed the EKG tracing showing Normal sinus rhythm..    Recent Labs  Lab 10/04/18  1550   WBC 5.4   HGB 12.4   MCV 90         POTASSIUM 3.8   CHLORIDE 108   CO2 29   BUN 19   CR 0.98   ANIONGAP 5   ARABELLA 9.2   *   TROPI <0.015       No results found for this or any previous visit (from the past 24 hour(s)).

## 2018-10-05 NOTE — PROGRESS NOTES
" 10/05/18 1355   Quick Adds   Type of Visit Initial PT Evaluation   Living Environment   Lives With alone   Living Arrangements condominium   Home Accessibility stairs within home   Number of Stairs to Enter Home 1   Number of Stairs Within Home 0   Stair Railings at Home none   Transportation Available car;family or friend will provide   Living Environment Comment Pt lives on the 4th floor with elevator access. She does have one step down into a sunken living and dining room, holds onto the wall for support, no railing   Self-Care   Dominant Hand right   Usual Activity Tolerance good   Current Activity Tolerance moderate   Regular Exercise no   Activity/Exercise/Self-Care Comment Pt does drive and runs her own errands   Functional Level Prior   Ambulation 0-->independent   Transferring 0-->independent   Toileting 0-->independent   Bathing 0-->independent   Dressing 0-->independent   Eating 0-->independent   Communication 0-->understands/communicates without difficulty   Swallowing 0-->swallows foods/liquids without difficulty   Cognition 0 - no cognition issues reported   Fall history within last six months no   Prior Functional Level Comment Pt does have a SEC but does not use it   General Information   Onset of Illness/Injury or Date of Surgery - Date 10/04/18   Referring Physician Alex Weston MD   Patient/Family Goals Statement \"Go home\"   Pertinent History of Current Problem (include personal factors and/or comorbidities that impact the POC) 87 YOF admitted under OBS status with severe /138 in the ED. PMH: HTN, breast CA   Precautions/Limitations fall precautions   Weight-Bearing Status - LUE full weight-bearing   Weight-Bearing Status - RUE full weight-bearing   Weight-Bearing Status - LLE full weight-bearing   Weight-Bearing Status - RLE full weight-bearing   General Observations Pleasant and cooperative   General Info Comments Activity: ambulate with assist   Cognitive Status Examination " "  Orientation orientation to person, place and time   Level of Consciousness alert   Follows Commands and Answers Questions 100% of the time;able to follow multistep instructions   Personal Safety and Judgment intact   Memory intact   Cognitive Comment no concerns   Pain Assessment   Patient Currently in Pain No   Integumentary/Edema   Integumentary/Edema Comments some LE edema that pt reports is \"better than yesterday\"   Posture    Posture Forward head position   Range of Motion (ROM)   ROM Comment BLEs WFL   Strength   Strength Comments BLEs grossly 5/5 throughout   Bed Mobility   Bed Mobility Comments Independent   Transfer Skills   Transfer Comments Independent   Gait   Gait Comments Independent gait x 200' withno LOB or gait deficits observed. Pt does prefer to walk close to a wall when available but no LOB when walking down the center of the hallway. Up/dpwn 1 step with single UE support of simulated wall modified independent. BP was elevated to 194/91 after gait, PA-C informed.   Balance   Balance no deficits were identified   Sensory Examination   Sensory Perception no deficits were identified   Coordination   Coordination no deficits were identified   Clinical Impression   Criteria for Skilled Therapeutic Intervention evaluation only   Clinical Presentation Stable/Uncomplicated   Clinical Presentation Rationale independent with mobility, BP still elevated with activity   Clinical Decision Making (Complexity) Low complexity   Therapy Frequency` other (see comments)  (eval only)   Predicted Duration of Therapy Intervention (days/wks) 1 day   Anticipated Discharge Disposition Home   Risk & Benefits of therapy have been explained Yes   Patient, Family & other staff in agreement with plan of care Yes   [facility] AM-PAC TM \"6 Clicks\"   2016, Trustees of [facility], under license to [employer].  All rights reserved.   6 Clicks Short Forms Basic Mobility Inpatient Short Form   [facility] " "AM-PAC  \"6 Clicks\" V.2 Basic Mobility Inpatient Short Form   1. Turning from your back to your side while in a flat bed without using bedrails? 4 - None   2. Moving from lying on your back to sitting on the side of a flat bed without using bedrails? 4 - None   3. Moving to and from a bed to a chair (including a wheelchair)? 4 - None   4. Standing up from a chair using your arms (e.g., wheelchair, or bedside chair)? 4 - None   5. To walk in hospital room? 4 - None   6. Climbing 3-5 steps with a railing? 4 - None   Basic Mobility Raw Score (Score out of 24.Lower scores equate to lower levels of function) 24   Total Evaluation Time   Total Evaluation Time (Minutes) 20     "

## 2018-10-06 VITALS
BODY MASS INDEX: 33.68 KG/M2 | HEIGHT: 62 IN | HEART RATE: 68 BPM | RESPIRATION RATE: 16 BRPM | OXYGEN SATURATION: 96 % | TEMPERATURE: 96.6 F | SYSTOLIC BLOOD PRESSURE: 163 MMHG | WEIGHT: 183 LBS | DIASTOLIC BLOOD PRESSURE: 78 MMHG

## 2018-10-06 PROCEDURE — 25000132 ZZH RX MED GY IP 250 OP 250 PS 637: Performed by: PHYSICIAN ASSISTANT

## 2018-10-06 PROCEDURE — 25000132 ZZH RX MED GY IP 250 OP 250 PS 637: Performed by: INTERNAL MEDICINE

## 2018-10-06 PROCEDURE — 99217 ZZC OBSERVATION CARE DISCHARGE: CPT | Performed by: PHYSICIAN ASSISTANT

## 2018-10-06 PROCEDURE — G0378 HOSPITAL OBSERVATION PER HR: HCPCS

## 2018-10-06 RX ORDER — AMLODIPINE BESYLATE 5 MG/1
5 TABLET ORAL DAILY
Status: DISCONTINUED | OUTPATIENT
Start: 2018-10-06 | End: 2018-10-06

## 2018-10-06 RX ORDER — AMLODIPINE BESYLATE 5 MG/1
5 TABLET ORAL DAILY
Qty: 30 TABLET | Refills: 0 | Status: SHIPPED | OUTPATIENT
Start: 2018-10-07 | End: 2018-10-06

## 2018-10-06 RX ORDER — HYDRALAZINE HYDROCHLORIDE 25 MG/1
25 TABLET, FILM COATED ORAL 3 TIMES DAILY
Qty: 90 TABLET | Refills: 0 | Status: SHIPPED | OUTPATIENT
Start: 2018-10-06 | End: 2018-10-06

## 2018-10-06 RX ORDER — HYDRALAZINE HYDROCHLORIDE 25 MG/1
25 TABLET, FILM COATED ORAL 3 TIMES DAILY
Status: DISCONTINUED | OUTPATIENT
Start: 2018-10-06 | End: 2018-10-06 | Stop reason: HOSPADM

## 2018-10-06 RX ORDER — AMLODIPINE BESYLATE 5 MG/1
5 TABLET ORAL EVERY EVENING
Qty: 30 TABLET | Refills: 0 | Status: SHIPPED | OUTPATIENT
Start: 2018-10-06 | End: 2021-07-22

## 2018-10-06 RX ORDER — AMLODIPINE BESYLATE 5 MG/1
5 TABLET ORAL DAILY
Status: DISCONTINUED | OUTPATIENT
Start: 2018-10-06 | End: 2018-10-06 | Stop reason: HOSPADM

## 2018-10-06 RX ORDER — HYDRALAZINE HYDROCHLORIDE 25 MG/1
25 TABLET, FILM COATED ORAL 3 TIMES DAILY
Qty: 90 TABLET | Refills: 0 | Status: SHIPPED | OUTPATIENT
Start: 2018-10-06 | End: 2021-07-22

## 2018-10-06 RX ADMIN — HYDRALAZINE HYDROCHLORIDE 25 MG: 25 TABLET ORAL at 08:06

## 2018-10-06 RX ADMIN — AMLODIPINE BESYLATE 5 MG: 5 TABLET ORAL at 15:06

## 2018-10-06 RX ADMIN — LOSARTAN POTASSIUM 100 MG: 100 TABLET, FILM COATED ORAL at 08:06

## 2018-10-06 RX ADMIN — POTASSIUM CHLORIDE 10 MEQ: 750 TABLET, EXTENDED RELEASE ORAL at 08:06

## 2018-10-06 RX ADMIN — CARVEDILOL 6.25 MG: 6.25 TABLET, FILM COATED ORAL at 08:06

## 2018-10-06 RX ADMIN — TORSEMIDE 10 MG: 10 TABLET ORAL at 08:05

## 2018-10-06 RX ADMIN — HYDRALAZINE HYDROCHLORIDE 25 MG: 25 TABLET ORAL at 13:08

## 2018-10-06 NOTE — DISCHARGE SUMMARY
[facility]    Discharge Summary  Hospitalist    Date of Admission:  10/4/2018  Date of Discharge:  10/6/2018  5:05 PM  Discharging Provider: Aleena Holloway PA-C  Date of Service (when I saw the patient): 10/06/18    Discharge Diagnoses   Hypertensive crisis   Sinus bradycardia, improved    History of Present Illness   Deborah Higginbotham is a 87 year old female who presents with hypertensive crisis. See H&P by Dr. Weston from 10/4/18 for complete details on presentation.     On my interview, pt is resting comfortably in bed accompanied by daughter. Anxious to discharge. Note BPs increased in the afternoon to SBP 180s. Pt was given afternoon dosing of hydralazine and amlodipine was started. SBP at the time of discharge in the 160s. Pt was asymptomatic. No acute events overnight.     Hospital Course   Deborah Higginbotham was admitted on 10/4/2018.  The following problems were addressed during her hospitalization:    Hypertension crisis   Sinus bradycardia, with a resting heart rate in the 50s, improved  Mrs. Higginbotham reports having a long-standing history of hypertension.  She been doing well until an episode of hyponatremia in May 2018.  Her hydrochlorothiazide discontinued at that time.  Subsequent to that, she had worsening of her blood pressure.  She had hypertension with associated severe headache in August 2018 prompting hospitalization.  At that time hydralazine was started she was discharged home.  She reports having followed up with nephrology with some medication adjustments.  Most recently she states that clonidine was added to 0.1 mg twice daily.  She has not been tolerating the medication.  Her heart rate in the 50s is prohibitive of increasing her beta-blocker.  Of note she was instructed to decrease her clonidine but instead mistakenly reduced her Coreg in half prior to this presentation.  - No changes in her EKG compared with previous.  - Renal ultrasound done in  August 2018 without evidence of renal artery stenosis  - Discontinue clonidine to avoid bradycardia and patient reported side effects.  - Started hydralazine at 25 mg TID during this hospitalization.  May need to increase to 25 mg QID or increase dose to 50 mg  - Started Amlodipine 5 mg po every day   - Continue her home olmesartan 40 mg poqd and carvedilol 6.25 mg twice daily  - Continue her torsemide 10 mg daily  - Explained to pt and family that we do not need to aim for normotension in the short-term, would like to avoid causing hypotension or symptoms such as lightheadedness or syncope.  - Pt will need close follow-up with nephrology, Dr. Falk and PCP for further medication adjustement   - Asked pt to monitor BPs 1-2 times daily, record values and bring to PCP at follow-up  - Sent prescriptions for hydralazine and amlodipine to pharmacy of choice.   - Offered home RN for medication management, but pt declined. Daughter plans to get a pill box for patient and assist her with setting up pills weekly.       Hyperlipidemia  - Hold her statin while she is under observation status, resume at discharge       Constipation  - Resume home regimen.     Large ventral hernia: As previously documented.  Not a current issue this hospitalization.  Follow-up with general surgery as outpatient PRN    Aleena Holloway PA-C    This patient was discussed with Dr. Mcnair of the Hospitalist Service who agrees with current plans as outlined above.     Significant Results and Procedures   See below    Pending Results   These results will be followed up by PCP  Unresulted Labs Ordered in the Past 30 Days of this Admission     No orders found from 8/5/2018 to 10/5/2018.          Code Status   Full Code       Primary Care Physician   Sut Stanford    Physical Exam   Temp: 96.6  F (35.9  C) Temp src: Oral BP: 163/78 Pulse: 68 Heart Rate: 70 Resp: 16 SpO2: 96 % O2 Device: None (Room air)    Vitals:    10/04/18 1315    Weight: 83 kg (183 lb)     Vital Signs with Ranges  Temp:  [96.6  F (35.9  C)] 96.6  F (35.9  C)  Pulse:  [64-68] 68  Heart Rate:  [70] 70  Resp:  [16] 16  BP: (149-189)/(70-92) 163/78  SpO2:  [94 %-96 %] 96 %  I/O last 3 completed shifts:  In: -   Out: 200 [Urine:200]    CONSTITUTIONAL: Pt laying in bed, dressed in hospital garb. Appears comfortable. Cooperative with interview. Accompanied by daughter at bedside.  HEENT: Normocephalic, atraumatic. Negative for conjunctival redness or scleral icterus.  Oral mucosa pink and moist; negative for ulcerations, erythema, or exudates.  Dentition in good repair.   CARDIOVASCULAR: RRR, no murmurs, rubs, or extra heart sounds appreciated. Pulses +2/4 and regular in upper and lower extremities, bilaterally.   RESPIRATORY: No increased work of breathing.  CTA, bilat; no wheezes, rales, or rhonchi appreciated.  GASTROINTESTINAL:  Abdomen soft, non-distended. BS auscultated in all four quadrants. Negative for tenderness to palpation.  No masses or organomegaly noted.  MUSCULOSKELETAL: Strength +5/5 in upper and lower extremities, bilaterally. No gross deformities noted. Normal muscle tone.   HEMATOLOGIC/LYMPHATIC/IMMUNOLOGIC:  Negative for lower extremity edema, bilaterally.  NEUROLOGIC: Alert and oriented to person, place, and time.  strength intact.   SKIN: Warm, dry, intact. No jaundice noted. Negative for suspicious lesions, rashes, bruising, open sores or abrasions.     Discharge Disposition   Discharged to home  Condition at discharge: Stable    Consultations This Hospital Stay   PHYSICAL THERAPY ADULT IP CONSULT  SOCIAL WORK IP CONSULT    Time Spent on this Encounter   IAleena, personally saw the patient today and spent greater than 30 minutes discharging this patient.    Discharge Orders     Reason for your hospital stay   You were hospitalized for further evaluation and treatment of uncontrolled blood pressures.     Follow-up and  recommended labs and tests    Follow up with primary care provider, Stu Stanford, within 7 days for hospital follow- up.  The following labs/tests are recommended: BMP.     Activity   Your activity upon discharge: activity as tolerated     Discharge Instructions   1) Follow-up with your primary care provider in the next week to discuss hospitalization, please record your blood pressures 1-2 times/day and bring them in to primary care provider follow-up   2) Stop taking clonidine   3) Start hydralazine 25 mg by mouth three times daily, Amlodipine 5 mg by mouth daily  4) Continue remainder of blood pressure meds (Coreg, Olmesartan, Torsemide)  5) Return to the ED with worsening symptoms     Full Code     Diet   Follow this diet upon discharge: Resume home diet. Limit salt and caffeine intake.       Discharge Medications   Discharge Medication List as of 10/6/2018  4:33 PM      CONTINUE these medications which have CHANGED    Details   amLODIPine (NORVASC) 5 MG tablet Take 1 tablet (5 mg) by mouth every evening, Disp-30 tablet, R-0, E-Prescribe      hydrALAZINE (APRESOLINE) 25 MG tablet Take 1 tablet (25 mg) by mouth 3 times daily, Disp-90 tablet, R-0, E-Prescribe         CONTINUE these medications which have NOT CHANGED    Details   ACETAMINOPHEN PO Take 1,000 mg by mouth every evening as needed 2 x 650 mg tablet , Historical      carvedilol (COREG) 6.25 MG tablet Take 6.25 mg by mouth 2 times daily (with meals), Historical      OLMESARTAN MEDOXOMIL PO Take 40 mg by mouth daily , Historical      potassium chloride (MICRO-K) 10 MEQ CR capsule Take 10 mEq by mouth 2 times daily Morning & Noon., Historical      PRAVASTATIN SODIUM PO Take 40 mg by mouth At Bedtime , Historical      TORSEMIDE PO Take 10 mg by mouth daily , Historical      VITAMIN D, CHOLECALCIFEROL, PO Take 2,000 Units by mouth daily , Historical         STOP taking these medications       cloNIDine (CATAPRES) 0.1 MG tablet Comments:   Reason for  Stopping:             Allergies   No Known Allergies  Data   Most Recent 3 CBC's:  Recent Labs   Lab Test  10/04/18   1550  08/05/18   0826  08/04/18   1528   WBC  5.4  5.0  5.8   HGB  12.4  13.7  13.4   MCV  90  90  91   PLT  180  186  204      Most Recent 3 BMP's:  Recent Labs   Lab Test  10/04/18   1550  08/05/18   0826  08/04/18   1528   NA  142  138  139   POTASSIUM  3.8  3.8  3.7   CHLORIDE  108  106  103   CO2  29  22  26   BUN  19  15  18   CR  0.98  0.73  0.93   ANIONGAP  5  10  10   ARABELLA  9.2  9.0  9.1   GLC  101*  116*  98     Most Recent 2 LFT's:  Recent Labs   Lab Test  05/25/18   1830   AST  17   ALT  18   ALKPHOS  69   BILITOTAL  0.4     Most Recent INR's and Anticoagulation Dosing History:  Anticoagulation Dose History     There is no flowsheet data to display.        Most Recent 3 Troponin's:  Recent Labs   Lab Test  10/04/18   1550  08/04/18   1528   TROPI  <0.015  <0.015     Most Recent Cholesterol Panel:No lab results found.  Most Recent 6 Bacteria Isolates From Any Culture (See EPIC Reports for Culture Details):No lab results found.  Most Recent TSH, T4 and A1c Labs:No lab results found.  Results for orders placed or performed during the hospital encounter of 08/04/18   US Renal Complete w Duplex Complete    Narrative    US RENAL COMPLETE WITH DOPPLER COMPLETE  8/5/2018 8:02 AM     HISTORY:  Hypertension. Evaluate for renovascular    COMPARISON: CT scan of the abdomen pelvis dated 7/13/2017.    FINDINGS:   The right kidney measures 9.4 x 4.3 x 4.4 cm. The right renal cortex  measures 1.1 cm in thickness. This is within normal limits. There is  no hydronephrosis. Renal cortical echogenicity is unremarkable.    Spectral waveform analysis was performed.  The peak systolic  velocities in the right renal artery at its origin, mid aspect, and  distal aspect are 87, 75, and 104 cm/s respectively. Low resistance  waveforms are identified in the right renal artery. The resistance  indices in the right  arcuate arteries range between 0.74-0.75.    The left kidney measures 10.5 x 4.9 x 4.4 cm. The left renal cortex  measures 1.2 cm in thickness. This is within normal limits. There is  no hydronephrosis. Renal cortical echogenicity is unremarkable.    Spectral waveform analysis was performed.  The peak systolic  velocities in the left renal artery at its origin, mid aspect, and  distal aspect are 134, 63, and 61 cm/s respectively. Low resistance  waveforms are identified in the left renal artery. The resistance  indices in the left arcuate arteries range between 0.69-0.75.    The peak systolic velocity in the abdominal aorta superior to the  origin of the renal arteries is 1 27 cm/s.      Impression    IMPRESSION: Per sonographic criteria, there is no evidence for  significant renal artery stenosis.     DEEP CALI MD

## 2021-07-22 ENCOUNTER — HOSPITAL ENCOUNTER (INPATIENT)
Facility: CLINIC | Age: 86
LOS: 7 days | Discharge: HOME | DRG: 394 | End: 2021-07-29
Attending: EMERGENCY MEDICINE | Admitting: STUDENT IN AN ORGANIZED HEALTH CARE EDUCATION/TRAINING PROGRAM
Payer: COMMERCIAL

## 2021-07-22 ENCOUNTER — APPOINTMENT (OUTPATIENT)
Dept: CT IMAGING | Facility: CLINIC | Age: 86
DRG: 394 | End: 2021-07-22
Attending: EMERGENCY MEDICINE
Payer: COMMERCIAL

## 2021-07-22 DIAGNOSIS — K56.609 SBO (SMALL BOWEL OBSTRUCTION) (H): ICD-10-CM

## 2021-07-22 LAB
ALBUMIN SERPL-MCNC: 4.3 G/DL (ref 3.4–5)
ALBUMIN UR-MCNC: 20 MG/DL
ALP SERPL-CCNC: 56 U/L (ref 40–150)
ALP SERPL-CCNC: 59 U/L (ref 40–150)
ALT SERPL W P-5'-P-CCNC: 27 U/L (ref 0–50)
ALT SERPL W P-5'-P-CCNC: 30 U/L (ref 0–50)
ANION GAP SERPL CALCULATED.3IONS-SCNC: 12 MMOL/L (ref 3–14)
APPEARANCE UR: CLEAR
AST SERPL W P-5'-P-CCNC: 21 U/L (ref 0–45)
AST SERPL W P-5'-P-CCNC: 32 U/L (ref 0–45)
BASOPHILS # BLD AUTO: 0 10E3/UL (ref 0–0.2)
BASOPHILS NFR BLD AUTO: 0 %
BILIRUB DIRECT SERPL-MCNC: 0.1 MG/DL (ref 0–0.2)
BILIRUB SERPL-MCNC: 1.1 MG/DL (ref 0.2–1.3)
BILIRUB SERPL-MCNC: 1.1 MG/DL (ref 0.2–1.3)
BILIRUB UR QL STRIP: NEGATIVE
BUN SERPL-MCNC: 24 MG/DL (ref 7–30)
CALCIUM SERPL-MCNC: 9.9 MG/DL (ref 8.5–10.1)
CHLORIDE BLD-SCNC: 94 MMOL/L (ref 94–109)
CO2 SERPL-SCNC: 25 MMOL/L (ref 20–32)
COLOR UR AUTO: ABNORMAL
CREAT SERPL-MCNC: 0.91 MG/DL (ref 0.52–1.04)
EOSINOPHIL # BLD AUTO: 0.2 10E3/UL (ref 0–0.7)
EOSINOPHIL NFR BLD AUTO: 3 %
ERYTHROCYTE [DISTWIDTH] IN BLOOD BY AUTOMATED COUNT: 12.5 % (ref 10–15)
GFR SERPL CREATININE-BSD FRML MDRD: 56 ML/MIN/1.73M2
GLUCOSE BLD-MCNC: 198 MG/DL (ref 70–99)
GLUCOSE UR STRIP-MCNC: 200 MG/DL
HCO3 BLDV-SCNC: 25 MMOL/L (ref 21–28)
HCT VFR BLD AUTO: 47.8 % (ref 35–47)
HGB BLD-MCNC: 16.7 G/DL (ref 11.7–15.7)
HGB UR QL STRIP: NEGATIVE
IMM GRANULOCYTES # BLD: 0 10E3/UL
IMM GRANULOCYTES NFR BLD: 0 %
KETONES UR STRIP-MCNC: NEGATIVE MG/DL
LACTATE BLD-SCNC: 4.6 MMOL/L
LACTATE SERPL-SCNC: 4.5 MMOL/L (ref 0.7–2)
LACTATE SERPL-SCNC: 5.4 MMOL/L (ref 0.7–2)
LEUKOCYTE ESTERASE UR QL STRIP: NEGATIVE
LIPASE SERPL-CCNC: 74 U/L (ref 73–393)
LYMPHOCYTES # BLD AUTO: 0.8 10E3/UL (ref 0.8–5.3)
LYMPHOCYTES NFR BLD AUTO: 11 %
MAGNESIUM SERPL-MCNC: 2.2 MG/DL (ref 1.6–2.3)
MCH RBC QN AUTO: 30.5 PG (ref 26.5–33)
MCHC RBC AUTO-ENTMCNC: 34.9 G/DL (ref 31.5–36.5)
MCV RBC AUTO: 87 FL (ref 78–100)
MONOCYTES # BLD AUTO: 1.2 10E3/UL (ref 0–1.3)
MONOCYTES NFR BLD AUTO: 17 %
NEUTROPHILS # BLD AUTO: 4.9 10E3/UL (ref 1.6–8.3)
NEUTROPHILS NFR BLD AUTO: 69 %
NITRATE UR QL: NEGATIVE
NRBC # BLD AUTO: 0 10E3/UL
NRBC BLD AUTO-RTO: 0 /100
PCO2 BLDV: 37 MM HG (ref 40–50)
PH BLDV: 7.44 [PH] (ref 7.32–7.43)
PH UR STRIP: 8 [PH] (ref 5–7)
PHOSPHATE SERPL-MCNC: 3.1 MG/DL (ref 2.5–4.5)
PHOSPHATE SERPL-MCNC: 3.8 MG/DL (ref 2.5–4.5)
PLATELET # BLD AUTO: 302 10E3/UL (ref 150–450)
PO2 BLDV: 38 MM HG (ref 25–47)
POTASSIUM BLD-SCNC: 3.1 MMOL/L (ref 3.4–5.3)
POTASSIUM BLD-SCNC: 3.9 MMOL/L (ref 3.4–5.3)
POTASSIUM BLD-SCNC: 4.1 MMOL/L (ref 3.4–5.3)
PROT SERPL-MCNC: 8 G/DL (ref 6.8–8.8)
RBC # BLD AUTO: 5.48 10E6/UL (ref 3.8–5.2)
RBC URINE: 1 /HPF
SAO2 % BLDV: 75 % (ref 94–100)
SARS-COV-2 RNA RESP QL NAA+PROBE: NEGATIVE
SODIUM SERPL-SCNC: 129 MMOL/L (ref 133–144)
SODIUM SERPL-SCNC: 131 MMOL/L (ref 133–144)
SP GR UR STRIP: 1.03 (ref 1–1.03)
SQUAMOUS EPITHELIAL: <1 /HPF
UROBILINOGEN UR STRIP-MCNC: NORMAL MG/DL
WBC # BLD AUTO: 7.1 10E3/UL (ref 4–11)
WBC URINE: 2 /HPF

## 2021-07-22 PROCEDURE — 82247 BILIRUBIN TOTAL: CPT | Performed by: EMERGENCY MEDICINE

## 2021-07-22 PROCEDURE — 87635 SARS-COV-2 COVID-19 AMP PRB: CPT | Performed by: EMERGENCY MEDICINE

## 2021-07-22 PROCEDURE — 96361 HYDRATE IV INFUSION ADD-ON: CPT

## 2021-07-22 PROCEDURE — C9803 HOPD COVID-19 SPEC COLLECT: HCPCS

## 2021-07-22 PROCEDURE — 99285 EMERGENCY DEPT VISIT HI MDM: CPT | Mod: 25

## 2021-07-22 PROCEDURE — 36415 COLL VENOUS BLD VENIPUNCTURE: CPT | Performed by: EMERGENCY MEDICINE

## 2021-07-22 PROCEDURE — 80076 HEPATIC FUNCTION PANEL: CPT | Performed by: STUDENT IN AN ORGANIZED HEALTH CARE EDUCATION/TRAINING PROGRAM

## 2021-07-22 PROCEDURE — 87040 BLOOD CULTURE FOR BACTERIA: CPT | Performed by: EMERGENCY MEDICINE

## 2021-07-22 PROCEDURE — 84295 ASSAY OF SERUM SODIUM: CPT | Performed by: STUDENT IN AN ORGANIZED HEALTH CARE EDUCATION/TRAINING PROGRAM

## 2021-07-22 PROCEDURE — 81003 URINALYSIS AUTO W/O SCOPE: CPT | Performed by: EMERGENCY MEDICINE

## 2021-07-22 PROCEDURE — 84100 ASSAY OF PHOSPHORUS: CPT | Performed by: STUDENT IN AN ORGANIZED HEALTH CARE EDUCATION/TRAINING PROGRAM

## 2021-07-22 PROCEDURE — 96375 TX/PRO/DX INJ NEW DRUG ADDON: CPT

## 2021-07-22 PROCEDURE — 120N000001 HC R&B MED SURG/OB

## 2021-07-22 PROCEDURE — 84132 ASSAY OF SERUM POTASSIUM: CPT | Performed by: STUDENT IN AN ORGANIZED HEALTH CARE EDUCATION/TRAINING PROGRAM

## 2021-07-22 PROCEDURE — 83690 ASSAY OF LIPASE: CPT | Performed by: EMERGENCY MEDICINE

## 2021-07-22 PROCEDURE — 83605 ASSAY OF LACTIC ACID: CPT | Performed by: EMERGENCY MEDICINE

## 2021-07-22 PROCEDURE — 99223 1ST HOSP IP/OBS HIGH 75: CPT | Mod: AI | Performed by: STUDENT IN AN ORGANIZED HEALTH CARE EDUCATION/TRAINING PROGRAM

## 2021-07-22 PROCEDURE — 74177 CT ABD & PELVIS W/CONTRAST: CPT

## 2021-07-22 PROCEDURE — 84460 ALANINE AMINO (ALT) (SGPT): CPT | Performed by: EMERGENCY MEDICINE

## 2021-07-22 PROCEDURE — 96374 THER/PROPH/DIAG INJ IV PUSH: CPT

## 2021-07-22 PROCEDURE — 250N000011 HC RX IP 250 OP 636: Performed by: STUDENT IN AN ORGANIZED HEALTH CARE EDUCATION/TRAINING PROGRAM

## 2021-07-22 PROCEDURE — 96366 THER/PROPH/DIAG IV INF ADDON: CPT | Mod: 59

## 2021-07-22 PROCEDURE — 99222 1ST HOSP IP/OBS MODERATE 55: CPT | Performed by: SURGERY

## 2021-07-22 PROCEDURE — 93005 ELECTROCARDIOGRAM TRACING: CPT

## 2021-07-22 PROCEDURE — 85004 AUTOMATED DIFF WBC COUNT: CPT | Performed by: EMERGENCY MEDICINE

## 2021-07-22 PROCEDURE — 84450 TRANSFERASE (AST) (SGOT): CPT | Performed by: EMERGENCY MEDICINE

## 2021-07-22 PROCEDURE — 250N000011 HC RX IP 250 OP 636: Performed by: EMERGENCY MEDICINE

## 2021-07-22 PROCEDURE — 84075 ASSAY ALKALINE PHOSPHATASE: CPT | Performed by: EMERGENCY MEDICINE

## 2021-07-22 PROCEDURE — 250N000009 HC RX 250: Performed by: EMERGENCY MEDICINE

## 2021-07-22 PROCEDURE — 258N000003 HC RX IP 258 OP 636: Performed by: EMERGENCY MEDICINE

## 2021-07-22 PROCEDURE — 120N000013 HC R&B IMCU

## 2021-07-22 PROCEDURE — 250N000011 HC RX IP 250 OP 636: Performed by: INTERNAL MEDICINE

## 2021-07-22 PROCEDURE — 36415 COLL VENOUS BLD VENIPUNCTURE: CPT | Performed by: STUDENT IN AN ORGANIZED HEALTH CARE EDUCATION/TRAINING PROGRAM

## 2021-07-22 PROCEDURE — 83735 ASSAY OF MAGNESIUM: CPT | Performed by: STUDENT IN AN ORGANIZED HEALTH CARE EDUCATION/TRAINING PROGRAM

## 2021-07-22 PROCEDURE — 80048 BASIC METABOLIC PNL TOTAL CA: CPT | Performed by: EMERGENCY MEDICINE

## 2021-07-22 PROCEDURE — 82803 BLOOD GASES ANY COMBINATION: CPT

## 2021-07-22 RX ORDER — FUROSEMIDE 20 MG
40 TABLET ORAL DAILY
COMMUNITY

## 2021-07-22 RX ORDER — NALOXONE HYDROCHLORIDE 0.4 MG/ML
0.2 INJECTION, SOLUTION INTRAMUSCULAR; INTRAVENOUS; SUBCUTANEOUS
Status: DISCONTINUED | OUTPATIENT
Start: 2021-07-22 | End: 2021-07-29 | Stop reason: HOSPADM

## 2021-07-22 RX ORDER — HYDROMORPHONE HCL IN WATER/PF 6 MG/30 ML
0.2 PATIENT CONTROLLED ANALGESIA SYRINGE INTRAVENOUS
Status: DISCONTINUED | OUTPATIENT
Start: 2021-07-22 | End: 2021-07-29 | Stop reason: HOSPADM

## 2021-07-22 RX ORDER — BISACODYL 10 MG
10 SUPPOSITORY, RECTAL RECTAL DAILY PRN
Status: DISCONTINUED | OUTPATIENT
Start: 2021-07-22 | End: 2021-07-29 | Stop reason: HOSPADM

## 2021-07-22 RX ORDER — PROCHLORPERAZINE MALEATE 5 MG
5 TABLET ORAL EVERY 6 HOURS PRN
Status: DISCONTINUED | OUTPATIENT
Start: 2021-07-22 | End: 2021-07-29 | Stop reason: HOSPADM

## 2021-07-22 RX ORDER — IOPAMIDOL 755 MG/ML
98 INJECTION, SOLUTION INTRAVASCULAR ONCE
Status: COMPLETED | OUTPATIENT
Start: 2021-07-22 | End: 2021-07-22

## 2021-07-22 RX ORDER — NALOXONE HYDROCHLORIDE 0.4 MG/ML
0.4 INJECTION, SOLUTION INTRAMUSCULAR; INTRAVENOUS; SUBCUTANEOUS
Status: DISCONTINUED | OUTPATIENT
Start: 2021-07-22 | End: 2021-07-29 | Stop reason: HOSPADM

## 2021-07-22 RX ORDER — PIPERACILLIN SODIUM, TAZOBACTAM SODIUM 3; .375 G/15ML; G/15ML
3.38 INJECTION, POWDER, LYOPHILIZED, FOR SOLUTION INTRAVENOUS EVERY 6 HOURS
Status: DISCONTINUED | OUTPATIENT
Start: 2021-07-22 | End: 2021-07-22

## 2021-07-22 RX ORDER — PIPERACILLIN SODIUM, TAZOBACTAM SODIUM 3; .375 G/15ML; G/15ML
3.38 INJECTION, POWDER, LYOPHILIZED, FOR SOLUTION INTRAVENOUS EVERY 6 HOURS
Status: DISCONTINUED | OUTPATIENT
Start: 2021-07-22 | End: 2021-07-23

## 2021-07-22 RX ORDER — POTASSIUM CHLORIDE 7.45 MG/ML
10 INJECTION INTRAVENOUS
Status: COMPLETED | OUTPATIENT
Start: 2021-07-22 | End: 2021-07-23

## 2021-07-22 RX ORDER — ONDANSETRON 4 MG/1
4 TABLET, ORALLY DISINTEGRATING ORAL EVERY 6 HOURS PRN
Status: DISCONTINUED | OUTPATIENT
Start: 2021-07-22 | End: 2021-07-29 | Stop reason: HOSPADM

## 2021-07-22 RX ORDER — LIDOCAINE 40 MG/G
CREAM TOPICAL
Status: DISCONTINUED | OUTPATIENT
Start: 2021-07-22 | End: 2021-07-22

## 2021-07-22 RX ORDER — DEXTROSE, SODIUM CHLORIDE, SODIUM LACTATE, POTASSIUM CHLORIDE, AND CALCIUM CHLORIDE 5; .6; .31; .03; .02 G/100ML; G/100ML; G/100ML; G/100ML; G/100ML
1000 INJECTION, SOLUTION INTRAVENOUS ONCE
Status: COMPLETED | OUTPATIENT
Start: 2021-07-22 | End: 2021-07-22

## 2021-07-22 RX ORDER — SODIUM CHLORIDE 9 MG/ML
INJECTION, SOLUTION INTRAVENOUS CONTINUOUS
Status: DISCONTINUED | OUTPATIENT
Start: 2021-07-22 | End: 2021-07-22

## 2021-07-22 RX ORDER — DEXTROSE, SODIUM CHLORIDE, SODIUM LACTATE, POTASSIUM CHLORIDE, AND CALCIUM CHLORIDE 5; .6; .31; .03; .02 G/100ML; G/100ML; G/100ML; G/100ML; G/100ML
INJECTION, SOLUTION INTRAVENOUS CONTINUOUS
Status: DISCONTINUED | OUTPATIENT
Start: 2021-07-22 | End: 2021-07-24

## 2021-07-22 RX ORDER — NITROGLYCERIN 0.4 MG/1
0.4 TABLET SUBLINGUAL EVERY 5 MIN PRN
Status: DISCONTINUED | OUTPATIENT
Start: 2021-07-22 | End: 2021-07-29 | Stop reason: HOSPADM

## 2021-07-22 RX ORDER — PIPERACILLIN SODIUM, TAZOBACTAM SODIUM 4; .5 G/20ML; G/20ML
4.5 INJECTION, POWDER, LYOPHILIZED, FOR SOLUTION INTRAVENOUS EVERY 6 HOURS
Status: DISCONTINUED | OUTPATIENT
Start: 2021-07-22 | End: 2021-07-22

## 2021-07-22 RX ORDER — METOPROLOL TARTRATE 25 MG/1
25 TABLET, FILM COATED ORAL 2 TIMES DAILY
Status: ON HOLD | COMMUNITY
End: 2023-01-11

## 2021-07-22 RX ORDER — ONDANSETRON 2 MG/ML
4 INJECTION INTRAMUSCULAR; INTRAVENOUS EVERY 30 MIN PRN
Status: DISCONTINUED | OUTPATIENT
Start: 2021-07-22 | End: 2021-07-22

## 2021-07-22 RX ORDER — PIPERACILLIN SODIUM, TAZOBACTAM SODIUM 4; .5 G/20ML; G/20ML
4.5 INJECTION, POWDER, LYOPHILIZED, FOR SOLUTION INTRAVENOUS ONCE
Status: COMPLETED | OUTPATIENT
Start: 2021-07-22 | End: 2021-07-22

## 2021-07-22 RX ORDER — LIDOCAINE 40 MG/G
CREAM TOPICAL
Status: DISCONTINUED | OUTPATIENT
Start: 2021-07-22 | End: 2021-07-29 | Stop reason: HOSPADM

## 2021-07-22 RX ORDER — ONDANSETRON 2 MG/ML
4 INJECTION INTRAMUSCULAR; INTRAVENOUS EVERY 6 HOURS PRN
Status: DISCONTINUED | OUTPATIENT
Start: 2021-07-22 | End: 2021-07-29 | Stop reason: HOSPADM

## 2021-07-22 RX ORDER — PROCHLORPERAZINE 25 MG
12.5 SUPPOSITORY, RECTAL RECTAL EVERY 12 HOURS PRN
Status: DISCONTINUED | OUTPATIENT
Start: 2021-07-22 | End: 2021-07-29 | Stop reason: HOSPADM

## 2021-07-22 RX ADMIN — ONDANSETRON 4 MG: 2 INJECTION INTRAMUSCULAR; INTRAVENOUS at 07:43

## 2021-07-22 RX ADMIN — LIDOCAINE HYDROCHLORIDE 3 ML: 40 INJECTION, SOLUTION RETROBULBAR; TOPICAL at 10:10

## 2021-07-22 RX ADMIN — SODIUM CHLORIDE, SODIUM LACTATE, POTASSIUM CHLORIDE, CALCIUM CHLORIDE AND DEXTROSE MONOHYDRATE 1000 ML: 5; 600; 310; 30; 20 INJECTION, SOLUTION INTRAVENOUS at 10:48

## 2021-07-22 RX ADMIN — SODIUM CHLORIDE 1000 ML: 9 INJECTION, SOLUTION INTRAVENOUS at 07:41

## 2021-07-22 RX ADMIN — PIPERACILLIN SODIUM AND TAZOBACTAM SODIUM 4.5 G: 4; .5 INJECTION, POWDER, LYOPHILIZED, FOR SOLUTION INTRAVENOUS at 09:35

## 2021-07-22 RX ADMIN — SODIUM CHLORIDE: 9 INJECTION, SOLUTION INTRAVENOUS at 13:48

## 2021-07-22 RX ADMIN — POTASSIUM CHLORIDE 10 MEQ: 7.46 INJECTION, SOLUTION INTRAVENOUS at 20:08

## 2021-07-22 RX ADMIN — SODIUM CHLORIDE 70 ML: 9 INJECTION, SOLUTION INTRAVENOUS at 08:26

## 2021-07-22 RX ADMIN — SODIUM CHLORIDE 1000 ML: 9 INJECTION, SOLUTION INTRAVENOUS at 09:34

## 2021-07-22 RX ADMIN — POTASSIUM CHLORIDE 10 MEQ: 7.46 INJECTION, SOLUTION INTRAVENOUS at 23:31

## 2021-07-22 RX ADMIN — PIPERACILLIN SODIUM AND TAZOBACTAM SODIUM 3.38 G: 3; .375 INJECTION, POWDER, LYOPHILIZED, FOR SOLUTION INTRAVENOUS at 22:06

## 2021-07-22 RX ADMIN — SODIUM CHLORIDE, SODIUM LACTATE, POTASSIUM CHLORIDE, CALCIUM CHLORIDE AND DEXTROSE MONOHYDRATE: 5; 600; 310; 30; 20 INJECTION, SOLUTION INTRAVENOUS at 16:01

## 2021-07-22 RX ADMIN — IOPAMIDOL 98 ML: 755 INJECTION, SOLUTION INTRAVENOUS at 08:26

## 2021-07-22 RX ADMIN — PIPERACILLIN SODIUM AND TAZOBACTAM SODIUM 3.38 G: 3; .375 INJECTION, POWDER, LYOPHILIZED, FOR SOLUTION INTRAVENOUS at 15:55

## 2021-07-22 ASSESSMENT — ACTIVITIES OF DAILY LIVING (ADL): ADLS_ACUITY_SCORE: 13

## 2021-07-22 ASSESSMENT — ENCOUNTER SYMPTOMS
BLOOD IN STOOL: 0
CONSTIPATION: 1
VOMITING: 1
NAUSEA: 1
APPETITE CHANGE: 1
DIZZINESS: 1
DIARRHEA: 0

## 2021-07-22 NOTE — PROVIDER NOTIFICATION
MD paged:   Upon arrival to unit, tele reading A-Fib CVR vs PVC's EKG obtained. Please see results in epic. Unable to find another to compare to. VSS, asymptomatic.

## 2021-07-22 NOTE — ED PROVIDER NOTES
History   Chief Complaint:  Nausea & Vomiting    The history is provided by the patient.      Deborah Higginbotham is a 89 year old female with history of bowel obstruction, breast cancer, and hypertension who presents with nausea and vomiting. The patient reports two days of abdominal pain, nausea, and dizziness making it difficult for her to walk. Tammi presents to the ED today because this morning, she started vomiting which was dark/black in color. The patient's daughter also endorses decreased appetite and constipation during this time. The patient denies any diarrhea or black or tarry stool. Tammi currently has a very large abdominal hernia.    Review of Systems   Constitutional: Positive for appetite change (decreased).   Gastrointestinal: Positive for constipation, nausea and vomiting (dark/black). Negative for blood in stool and diarrhea.   Neurological: Positive for dizziness.   All other systems reviewed and are negative.      Allergies:  The patient has no known allergies.     Medications:  Tylenol  Norvasc  Coreg  Apresoline  Olmesartan medoxomil  Potassium chloride  Pravastatin sodium  Torsemide    Past Medical History:    Breast cancer  Hypercholesterolemia  Hypertension  Bilateral lower extremity edema  Degenerative disk disease, lumbar with foraminal stenosis  Hyponatremia  Bowel obstruction     Past Surgical History:    Modified radical mastectomy  Breast reconstruction  Hernia repair  Rotator cuff repair     Family History:    Father: bone cancer, heart disease  Sister: breast cancer     Social History:  The patient presents to the ED with her daughter.     Physical Exam     Patient Vitals for the past 24 hrs:   BP Temp Temp src Pulse Resp SpO2 Weight   07/22/21 0958 (!) 208/109 -- -- 84 -- -- --   07/22/21 0856 -- -- -- -- -- 96 % --   07/22/21 0717 (!) 173/85 -- -- -- -- -- --   07/22/21 0716 -- 97.6  F (36.4  C) Temporal 76 20 95 % 87.5 kg (193 lb)       Physical Exam   General: Resting  uncomfortably on the gurney. Appears fatigued.  Head:  The scalp, face, and head appear normal  Eyes:  The pupils are equal, round, and reactive to light    There is no nystagmus    Extraocular muscles are intact    Conjunctivae and sclerae are normal  ENT:    The nose is normal    Pinnae are normal    The oropharynx is normal    Uvula is in the midline  Neck:  Normal range of motion    There is no rigidity noted    There is no midline cervical spine pain/tenderness    Trachea is in the midline    No mass is detected  CV:  Regular rate and underlying rhythm     Normal S1/S2, no S3/S4    No pathological murmur detected  Resp:  Lungs are clear    There is no tachypnea    Non-labored    No rales    No wheezing   GI:  Abdomen is soft, there is no rigidity.     The abdomen reveals abdominal obesity with a large ventral hernia to the mid- and right abdomen. There is no focal area of tenderness.    Mild distention.     No rebound tenderness     Non-surgical without peritoneal features  MS:  Normal muscular tone    Symmetric motor strength    No major joint effusions    No asymmetric leg swelling, no calf tenderness  Skin:  No rash or acute skin lesions noted  Neuro: Speech is normal and fluent  Psych:  Awake. Alert.      Normal affect.  Appropriate interactions.  Lymph: No anterior cervical lymphadenopathy noted    Emergency Department Course     Imaging:  CT abdomen pelvis with contrast  1.  Small bowel obstruction due to a large anterior abdominal wall  hernia. Marked fluid distention of the stomach and duodenum also  noted. Suggest surgical consultation.  2.  Cholelithiasis and probable tiny scattered bilateral renal  cortical cysts appear stable. No specific follow-up suggested. No  other acute changes in the abdomen or pelvis to account for patient's  symptoms.  As per radiology.     Laboratory:  CBC: WBC 7.1, HGB 16.7(H),      BMP: Sodium: 131(L), Glucose: 198(H), GFR: 56(L) o/w WNL (Creatinine 0.91)      Lactic acid (result time 0746): 5.4(H)     Lactic acid (result time 1001): 4.5(H)     Lipase: 74    AST: 21    ALT: 27    Alkaline phosphatase: 56    Bilirubin: 1.1    iStat Gases (lactate) venous, POCT: LACTW: 4.6(H), O2 sat: 75(L), pCO2V: 37(L), pH: 7.44(H) o/w WNL    UA with microscopic: Glucose: 200, pH: 8.0(H), Protein Albumin: 20 o/w WNL     Asymptomatic COVID19 Virus PCR by nasopharyngeal swab: Negative     Blood cultures right arm x2: PENDING    Emergency Department Course:    Reviewed:  I reviewed nursing notes, vitals, past medical history and care everywhere    Assessments:  0723 I obtained history and examined the patient as noted above.   0934 I rechecked the patient and explained findings. At this time, Tammi reported still being somewhat nauseas.    1100 I rechecked the patient. At this time, 3,400 mL had drained from her NG tube.   1125 The patient's third liter of crystalloid is nearly complete. She is getting up to use the comode. A repeat lactate was ordered.     Consults:   0942 I spoke with Dr. Peres, surgery, regarding the patient.   1020 I spoke with Dr. Addison, hospitalist, regarding the patient, who agreed to admit the patient.     Interventions:  0741 NS 1 L IV  0743 Zofran 4 mg IV  0934 NS 1 L IV  0935 Zosyn 4.5 g IV  1010 Lidocaine inhalant 4% 3 mL nebulization   1048 Dextrose 5% in lactated ringers 1 L IV     Disposition:  The patient was admitted to the hospital under the care of Dr. Addison.     Impression & Plan     CMS:   The patient has signs of Severe Sepsis  If one the following conditions is present, a 30 mL/kg bolus is recommended as part of the 6 hour bundle (IBW can be used for BMI >30, or document refusal/contraindication):      1.   Initial hypotension  defined as 2 bps < 90 or map < 65 in the 6hrs before or 6hrs after time zero.     2.  Lactate >4.     The patient has signs of Severe Sepsis as evidenced by:    1. 2 SIRS criteria, AND  2. Suspected infection, AND  "  3. Organ dysfunction: Lactic Acidosis with value >2.0    Time severe sepsis diagnosis confirmed: 0746 07/22/21 as this was the time when Lactate resulted, and the level was > 2.0    3 Hour Severe Sepsis Bundle Completion:    1. Initial Lactic Acid Result:   Recent Labs   Lab Test 07/22/21  0945 07/22/21  0738   LACT 4.5* 5.4*     2. Blood Cultures before Antibiotics: Yes  3. Broad Spectrum Antibiotics Administered:  yes       Anti-infectives (From admission through now)    Start     Dose/Rate Route Frequency Ordered Stop    07/22/21 0805  piperacillin-tazobactam (ZOSYN) 4.5 g vial to attach to  mL bag     Note to Pharmacy: For [facility], [facility] and [facility]: For Zosyn-naive patients, use the \"Zosyn initial dose + extended infusion\" order panel.    4.5 g  over 30 Minutes Intravenous ONCE 07/22/21 0801 07/22/21 1034          4. Fluid volume administered in ED:  Full 30 mL/kg bolus given (see amount below).    BMI Readings from Last 1 Encounters:   07/22/21 35.30 kg/m      30 mL/kg fluids based on weight: 2,630 mL  30 mL/kg fluids based on IBW (must be >= 60 inches tall): Patient ideal weight not available.                Severe Sepsis reassessment:  1. Repeat Lactic Acid Level: 4.5  2. MAP>65 after initial IVF bolus, will continue to monitor fluid status and vital signs    I attest to having performed a repeat sepsis exam and assessment of perfusion at 1100 and the results demonstrate improved perfusion. I noted the lactic acid was above 4.0, however the patient is profoundly dehydrated, contributing to the elevated lactic acid labs.     Medical Decision Making:  This patient presents with abdominal discomfort and significant vomiting and poor p.o. intake.  Patient has a history of a large ventral abdominal hernia and history of bowel obstruction in the past.  Patient was immediately noted to have a slightly elevated lactate at approximately 5.  She was hypertensive.  The patient has had essentially no p.o. intake for " almost 2 days.  Patient underwent CT scan of the abdomen to look for strangulated hernia or bowel obstruction, and small bowel obstruction is noted with significant fluid accumulation back up into the duodenum and stomach.  An NG tube was placed and the patient returned over 3 L of bilious appearing output.  There was no significant coffee-ground emesis or bright red blood in the NG tube fluid.  White blood cell count is 7.1.  Hemoglobin 16.7 with mild hemoconcentration.  The patient was noted to have mild prerenal azotemia with a BUN of 24 and creatinine of 0.91.  The patient underwent IV hydration assuming a diagnosis of dehydration and severe sepsis.  There was no indication of septic shock other than the isolated lactate greater than 4.  Repeat lactates were done and returned at 4.5 and then after the third liter came back at 4.6.  Patient was treated empirically with Zosyn given the possibility of bacteremia that could result from ischemic bowel.  The CT scan does not show evidence of definitive incarceration or strangulation of the large abdominal hernia.  I discussed the CT scan with Dr. Peres from surgery.  He indicates that more than likely he would try to approach this with NG tube suction, n.p.o., and IV fluids, and hopefully not an acute operative case given the complexity of the hernia and the patient's body habitus.  Originally requested and stepdown bed however there are no beds available so the patient had med surg telemetry requested.  There are no indication for vasopressors at this time and I think this disposition is reasonable.  The patient will need ongoing IV hydration, and NG tube suction, and formal surgical consultation.    Covid-19  Deborah Higginbotham was evaluated during a global COVID-19 pandemic, which necessitated consideration that the patient might be at risk for infection with the SARS-CoV-2 virus that causes COVID-19.   Applicable protocols for evaluation were followed during the  patient's care.   COVID-19 was considered as part of the patient's evaluation. The plan for testing is:  a test was obtained during this visit.    Diagnosis:    ICD-10-CM    1. SBO (small bowel obstruction) (H)  K56.609    Severe sepsis  Dehydration    Scribe Disclosure:  Filiberto BUSTOS, am serving as a scribe at 7:23 AM on 7/22/2021 to document services personally performed by Ike Rodrigez MD based on my observations and the provider's statements to me.            Ike Rodrigez MD  07/22/21 153

## 2021-07-22 NOTE — CONSULTS
"General Surgery Consultation    Deborah Higginbotham MRN#: [id number]   Age: 89 year old YOB: 1931     Date of Admission:  7/22/2021  Reason for consult: Small Bowel Obstruction; Ventral Hernia       Requesting physician: Edmar Addison MD       Surgeon:        Maximus Peres MD        Chief Complaint:   Small bowel obstruction     History is obtained from the patient, daughter and chart review         History of Present Illness:   General Surgery was asked to evaluate this patient at the request of Dr. Addison for SBO.    This patient is a 89 year old female with a significant past medical history of high cholesterol, hypertension, and h/o cancer who presents with nausea and 1-2 episodes of vomiting over last 2 days.  Her emesis was very dark in color which is the main reason she has come to ED for assessment.  She originally had RLQ abdominal pain associated with this, but pt reports this was worse yesterday and is better today.  In fact, since her NGT placement and subsequent 3.5+L of gastric contents were removed, she has no pain in her abdomen.  She has associated loss of appetite and notes abdominal fullness.  She has a known large ventral hernia.  She denies fevers, chills, or night sweats.  Prior to 7/20/21 she felt very good.    In regards to her hernia, she reports muscle was taken from this region after breast reconstruction.  She has spoken to a General Surgeon (possibly at [facility]), some 5+ years ago, who dissuaded surgical repair due to complexity.  She is in favor of avoiding surgery if at all possible, and if absolutely necessary, would prefer a \"less is more\" approach.      Of note, she has had a SBO roughly 9 years ago in which surgery was necessary.  She was febrile and in more pain at that time.  She underwent Ex lap and pt and daughter unsure if any bowel removed.  .          Past Medical History:   Deborah Higginbotham  has a past medical history of Cancer (H), High cholesterol, and " "Hypertension.          Past Surgical History:     Past Surgical History:   Procedure Laterality Date     BREAST SURGERY       HERNIA REPAIR       ORTHOPEDIC SURGERY               Social History:     Social History     Tobacco Use     Smoking status: Former Smoker     Smokeless tobacco: Never Used     Tobacco comment: quit in the 70s   Substance Use Topics     Alcohol use: Not on file     Comment: daily              Family History:   This patient has no significant family history          Allergies:   No Known Allergies          Medications:     Prior to Admission medications    Medication Sig Start Date End Date Taking? Authorizing Provider   furosemide (LASIX) 20 MG tablet Take 40 mg by mouth daily   Yes Unknown, Entered By History   metoprolol tartrate (LOPRESSOR) 25 MG tablet Take 25 mg by mouth 2 times daily   Yes Unknown, Entered By History   potassium chloride (MICRO-K) 10 MEQ CR capsule Take 10 mEq by mouth 2 times daily Morning & Noon. 7/2/18  Yes Reported, Patient   PRAVASTATIN SODIUM PO Take 40 mg by mouth At Bedtime    Yes Reported, Patient   VITAMIN D, CHOLECALCIFEROL, PO Take 2,000 Units by mouth daily    Yes Unknown, Entered By History             Review of Systems:   The Review of Systems is negative other than noted in the HPI          Physical Exam:   Blood pressure 138/89, pulse 90, temperature 97.6  F (36.4  C), temperature source Temporal, resp. rate 20, height 1.518 m (4' 11.76\"), weight 87.5 kg (193 lb), SpO2 92 %.    General - This is a well developed, well nourished female in no apparent distress.  Her daughter is present.  HEENT - Normocephalic. Atraumatic. Moist mucous membranes. Pupils equal.  No scleral icterus.  NGT in place and on LIS.  Roughly 750mL of dark bilious output in cannister, possibly with slightest tinge of red.  Neck - Supple without masses.  Trachea midline.  Lungs - Clear to ascultation bilaterally.    Heart - Regular rate & rhythm without murmur.  Abdomen - Soft, " distended abdomen that lists to a large ventral hernia in RLQ.   The contents of the hernia feel soft and nontender throughout except lower medial aspect of the hernia is mildly tender with more fullness than other areas.   Extremities - Moves all extremities. Warm without edema.  Neurologic - Nonfocal.  Very pleasant, answers appropriately.  Pleasant affect.          Data:   Labs:  WBC -   WBC   Date Value Ref Range Status   10/04/2018 5.4 4.0 - 11.0 10e9/L Final     WBC Count   Date Value Ref Range Status   07/22/2021 7.1 4.0 - 11.0 10e3/uL Final     Hgb -   Hemoglobin   Date Value Ref Range Status   07/22/2021 16.7 (H) 11.7 - 15.7 g/dL Final   10/04/2018 12.4 11.7 - 15.7 g/dL Final     COVID negative 7/22/21    Liver Function Studies -   Recent Labs   Lab Test 07/22/21  0738 05/25/18  1830   PROTTOTAL  --  7.4   ALBUMIN  --  4.1   BILITOTAL 1.1 0.4   ALKPHOS 56 69   AST 21 17   ALT 27 18     Lactic acid on admission was 5.4.  With fluid resuscitation, lowered to 4.5.  Currently, at 4.6        CT scan of the abdomen:   FINDINGS:   LOWER CHEST: Lung bases are clear. Extensive coronary artery  calcification is noted.     HEPATOBILIARY: A few calcified gallstones are noted in the  gallbladder. Liver is unremarkable.     PANCREAS: Normal.     SPLEEN: Normal.     ADRENAL GLANDS: Indeterminate left adrenal nodule measures 1.4 cm on  series 4, image 75 which is unchanged. Right adrenal gland is  unremarkable.     KIDNEYS/BLADDER: Numerous tiny low-attenuation renal cortical lesions  are present bilaterally and appear stable, probably cysts but too  small to characterize by imaging. No urinary tract calculi or  hydronephrosis.     BOWEL: Stomach, duodenum and proximal small bowel are distended with  fluid with scattered air-fluid levels consistent with obstruction.  Large ventral hernia contains numerous loops of small bowel where  there is a focal transition point on series 4, image 242 indicating a  small bowel  obstruction related to the hernia. Distal small bowel and  colon are decompressed. Appendix is normal.     LYMPH NODES: No enlarged abdominal or pelvic lymph nodes.     VASCULATURE: Chest find plaque abdominal aorta and branch vessels  without evidence of aneurysm or dissection.     PELVIC ORGANS: Uterus, ovaries and rectum are unremarkable. No free  pelvic fluid or pelvic mass.     ADDITIONAL FINDINGS: None.     MUSCULOSKELETAL: Degenerative spine changes. Bones appear osteopenic.                                                                      IMPRESSION:   1.  Small bowel obstruction due to a large anterior abdominal wall  hernia. Marked fluid distention of the stomach and duodenum also  noted. Suggest surgical consultation.     2.  Cholelithiasis and probable tiny scattered bilateral renal  cortical cysts appear stable. No specific follow-up suggested. No  other acute changes in the abdomen or pelvis to account for patient's  symptoms.     As read by Radiology              Assessment:     Small Bowel Obstruction  Large Ventral Hernia         Plan:     -Discussed SBO and VH with pt and daughter.  Feel surgery is unlikely to be necessary if given bowel rest and time.  However, did discuss concern of lactic acid being high, but fortunately she has no other signs to suggest bowel ischemia.  Her vitals are stable and her pain is improving.  -Admission to Hospitalist team.  General Surgery will follow along.  -Monitor for changes overnight.  Would also recheck lactic in am though I don't think this alone would lead us to consider surgery.  -Continue NPO, NGT to LIS.  Monitor output color.  Will add PPI.  Continue IVF resuscitation.  -Zosyn started for possible intraabdominal infection.  Continue this for now.  -Dr. Peres is aware of pt and has spoken to Hospitalist managing her care.  He will evaluate the patient independently.      Adolfo Thacker PA-C, physician assistant for Maximus Peres MD  [facility], [phone]  Pager [phone]

## 2021-07-22 NOTE — H&P
[facility]    History and Physical - Hospitalist Service       Date of Admission:  7/22/2021    Assessment & Plan      Deborah Higginbotham is a 89 year old female admitted on 7/22/2021. She is admitted with SBO and elevated lactate.    Large anterior abdominal wall hernia  SBO  Patient presents with 2 days of abdominal distention, fullness, and a single episode of nausea vomiting on 7/22/2020 1 in the AM.  She states that she has been pain-free other than a single episode on 7/20/2021.  CT demonstrates a large abdominal hernia which has been noted previously, and multiple dilated loops of bowel.  She had NG placed with approximately 3.5 L of output immediately.  She remains doubly soft and comfortable.  -Admit to inpatient  -Consult to general surgery  -IVF  -N.p.o.  -NG to LIS    Elevated Lactate  Unclear cause for this.  Improved from 5.4 to 4.5 with 2 L of IVF.  The patient does not appear toxic, she does not appear septic, she has a doughy soft abdomen not concerning for infarcted bowel.  Certainly does possibility that this is all due to hypovolemia but due to her normal blood pressure, no NICK, no other signs of severe hypovolemia unlikely that this is a volume induced lactic acidosis.  Due to the clinical picture, biggest concern is due to bowel ischemia either due to distention from fluid, or lack of blood flow from torsion and SBO, however the lack of pain or discomfort goes against this.  - continue supplemental IVF  - trend lactate  - due to risk of rapid decompensation with persistently elevated lactate, IMC status  - continue zosyn in case this is from occult bacteremia from translocation from gut  - follow blood cultures.     Hyponatremia, mild  - likely hypovolemic in setting of poor PO and n/v on admission  - monitor with IVF    HTN  Accelerated HTN  -200/ on admission.   - hold PTA meds in setting of NPO  - PRN IV metoprolol for tachycardia  - PRN labetalol and  hydralazine    BLE edema, chronic  - hold PTA lasix    HLD  - Hold PTA statin while NPO       Diet: NPO for Medical/Clinical Reasons Except for: Ice Chips NPO  DVT Prophylaxis: Pneumatic Compression Devices  Ferraro Catheter: Not present  Central Lines: None  Code Status:  DNR/DNI, discussed on admission    Risk Factors Present on Admission         # Hyponatremia: Na = 131 mmol/L (Ref range: 133 - 144 mmol/L) on admission, will monitor as appropriate           Disposition Plan   Expected discharge: >3 days recommended to tbd once SBO resolved, lactate resolved.     The patient's care was discussed with the Bedside Nurse, Patient, Patient's Family, surgery Consultant and ED Team.    Edmar Addison MD  [facility]  Securely message with the Vocera Web Console (learn more here)  Text page via [employer] Paging/Directory      ______________________________________________________________________    Chief Complaint   Abdominal fullness    History is obtained from the patient and family    History of Present Illness   Deborah Higginbotham is a 89 year old female who presents with nausea, vomiting, and abdominal fullness.  She states that symptoms began on 7/20/2021.  She initially had some right lower quadrant abdominal pain but that has resolved.  She did have an episode of dry heaving with a small amount of output on 7/20/2021.  Since then she has had progressive abdominal fullness, nausea.  She is taking nothing by mouth other than some liquids for the last several days.  She has had no gas or bowel movements.  She has been urinating, but less frequently.    Review of systems is otherwise negative for fevers, chills, chest pain, shortness of breath, rash, swelling, or other associated symptoms.  She states she feels at her baseline other than her abdominal fullness.    Review of Systems    The 10 point Review of Systems is negative other than noted in the HPI or here.     Past Medical History    I  have reviewed this patient's medical history and updated it with pertinent information if needed.   Past Medical History:   Diagnosis Date     Cancer (H)     breast     High cholesterol      Hypertension        Past Surgical History   I have reviewed this patient's surgical history and updated it with pertinent information if needed.  Past Surgical History:   Procedure Laterality Date     BREAST SURGERY       HERNIA REPAIR       ORTHOPEDIC SURGERY         Social History   I have reviewed this patient's social history and updated it with pertinent information if needed.  Social History     Tobacco Use     Smoking status: Former Smoker     Smokeless tobacco: Never Used     Tobacco comment: quit in the 70s   Substance Use Topics     Alcohol use: Not on file     Comment: daily      Drug use: No       Family History   I have reviewed this patient's family history and updated it with pertinent information if needed.  Family History   Problem Relation Age of Onset     Coronary Artery Disease Father      Bone Cancer Father      Breast Cancer Sister        Prior to Admission Medications   Prior to Admission Medications   Prescriptions Last Dose Informant Patient Reported? Taking?   PRAVASTATIN SODIUM PO 7/21/2021 at hs Pharmacy Yes Yes   Sig: Take 40 mg by mouth At Bedtime    VITAMIN D, CHOLECALCIFEROL, PO 7/21/2021 at am Self Yes Yes   Sig: Take 2,000 Units by mouth daily    furosemide (LASIX) 20 MG tablet 7/21/2021 at am  Yes Yes   Sig: Take 40 mg by mouth daily   metoprolol tartrate (LOPRESSOR) 25 MG tablet 7/21/2021 at pm  Yes Yes   Sig: Take 25 mg by mouth 2 times daily   potassium chloride (MICRO-K) 10 MEQ CR capsule 7/21/2021 at 1200 Pharmacy Yes Yes   Sig: Take 10 mEq by mouth 2 times daily Morning & Noon.      Facility-Administered Medications: None     Allergies   No Known Allergies    Physical Exam   Vital Signs: Temp: 97.6  F (36.4  C) Temp src: Temporal BP: (!) 149/103 Pulse: 78   Resp: 20 SpO2: 93 % O2 Device:  None (Room air)    Weight: 193 lbs 0 oz    Constitutional: Awake, alert, cooperative, no apparent cardiopulmonary distress.  Eyes: Conjunctiva and pupils examined and normal.  HEENT: Moist mucous membranes, normal dentition.  Respiratory: Clear to auscultation bilaterally, no crackles or wheezing.  Cardiovascular: Regular rate and rhythm, normal S1 and S2, and no murmur noted.  GI: Soft, distended, and firm, non-tender.  Lymph/Hematologic: No anterior cervical or supraclavicular adenopathy.  Skin: No rashes, no cyanosis, no edema noted on exposed skin.  Musculoskeletal: No joint swelling, erythema or tenderness. No gross bony abnormalities  Neurologic: Cranial nerves 2-12 grossly intact, normal strength and sensation.  Psychiatric: Alert, oriented to person, place and time, no obvious anxiety or depression.    Initially assessed at approximately 9:45 in the morning.  She was reassessed at approximately 3 PM in the afternoon.  After significant NG output, her abdomen is now soft.  She has a doughy soft abdomen that is nontender to deep palpation, she feels and appears comfortable.      Data   Data reviewed today: I reviewed all medications, new labs and imaging results over the last 24 hours. I personally reviewed the abdominal CT image(s) showing Large abdominal hernia.  Multiple distended small bowel and stomach..    Recent Labs   Lab 07/22/21  0738   WBC 7.1   HGB 16.7*   MCV 87      *   POTASSIUM 3.9   CHLORIDE 94   CO2 25   BUN 24   CR 0.91   ANIONGAP 12   ARABELLA 9.9   *   BILITOTAL 1.1   ALKPHOS 56   ALT 27   AST 21   LIPASE 74     Most Recent 3 CBC's:Recent Labs   Lab Test 07/22/21  0738 10/04/18  1550 08/05/18  0826   WBC 7.1 5.4 5.0   HGB 16.7* 12.4 13.7   MCV 87 90 90    180 186     Most Recent 3 BMP's:Recent Labs   Lab Test 07/22/21  0738 10/04/18  1550 08/05/18  0826   * 142 138   POTASSIUM 3.9 3.8 3.8   CHLORIDE 94 108 106   CO2 25 29 22   BUN 24 19 15   CR 0.91 0.98 0.73    ANIONGAP 12 5 10   ARABELLA 9.9 9.2 9.0   * 101* 116*     Recent Results (from the past 24 hour(s))   CT Abdomen Pelvis w Contrast    Narrative    CT ABDOMEN AND PELVIS WITH CONTRAST 7/22/2021 8:50 AM    CLINICAL HISTORY: Abdominal pain and vomiting with history of bowel  obstruction. Known abdominal wall hernia.    TECHNIQUE: CT scan of the abdomen and pelvis was performed following  injection of IV contrast. Multiplanar reformats were obtained. Dose  reduction techniques were used.  CONTRAST: 98 mL Isovue-[id number]    COMPARISON: CT abdomen and pelvis 7/13/2017.    FINDINGS:   LOWER CHEST: Lung bases are clear. Extensive coronary artery  calcification is noted.    HEPATOBILIARY: A few calcified gallstones are noted in the  gallbladder. Liver is unremarkable.    PANCREAS: Normal.    SPLEEN: Normal.    ADRENAL GLANDS: Indeterminate left adrenal nodule measures 1.4 cm on  series 4, image 75 which is unchanged. Right adrenal gland is  unremarkable.    KIDNEYS/BLADDER: Numerous tiny low-attenuation renal cortical lesions  are present bilaterally and appear stable, probably cysts but too  small to characterize by imaging. No urinary tract calculi or  hydronephrosis.    BOWEL: Stomach, duodenum and proximal small bowel are distended with  fluid with scattered air-fluid levels consistent with obstruction.  Large ventral hernia contains numerous loops of small bowel where  there is a focal transition point on series 4, image 242 indicating a  small bowel obstruction related to the hernia. Distal small bowel and  colon are decompressed. Appendix is normal.    LYMPH NODES: No enlarged abdominal or pelvic lymph nodes.    VASCULATURE: Chest find plaque abdominal aorta and branch vessels  without evidence of aneurysm or dissection.    PELVIC ORGANS: Uterus, ovaries and rectum are unremarkable. No free  pelvic fluid or pelvic mass.    ADDITIONAL FINDINGS: None.    MUSCULOSKELETAL: Degenerative spine changes. Bones appear  osteopenic.      Impression    IMPRESSION:   1.  Small bowel obstruction due to a large anterior abdominal wall  hernia. Marked fluid distention of the stomach and duodenum also  noted. Suggest surgical consultation.    2.  Cholelithiasis and probable tiny scattered bilateral renal  cortical cysts appear stable. No specific follow-up suggested. No  other acute changes in the abdomen or pelvis to account for patient's  symptoms.    JACEK AMAYA MD         SYSTEM ID:  [id number]

## 2021-07-22 NOTE — PLAN OF CARE
Patient arrival to room 302 at 1745, family at bedside, patient A&O, denies pain, up SBA, VSS, NG to LIS, landmark 50 cm, draining dark brown output, abdomen distended, BS hypoactive, IVF maintained. Tele SR with PAC's.

## 2021-07-22 NOTE — ED TRIAGE NOTES
Pt presents with abd pain and n/v that began on Tuesday morning. Pt states has not been able to keep anything down due to the pain and vomiting so she has not taken her medications. Pt states this morning when she vomited it looked bloody/dark when she vomited. Denies fever/chills

## 2021-07-22 NOTE — PHARMACY-ADMISSION MEDICATION HISTORY
Pharmacy Medication History  Admission medication history interview status for the 7/22/2021  admission is complete. See EPIC admission navigator for prior to admission medications     Location of Interview: Patient room  Medication history sources: Patient, Surescripts and pt bottles    Significant changes made to the medication list:  Removed several blood pressure medications: amlodipine, carvedilol, hydralazine, olmesartan, torsemide.  Added metoprolol, furosemide    In the past week, patient estimated taking medication this percent of the time: greater than 90%    Additional medication history information:   None    Medication reconciliation completed by provider prior to medication history? Yes    Time spent in this activity: 15 min    Prior to Admission medications    Medication Sig Last Dose Taking? Auth Provider   furosemide (LASIX) 20 MG tablet Take 40 mg by mouth daily 7/21/2021 at am Yes Unknown, Entered By History   metoprolol tartrate (LOPRESSOR) 25 MG tablet Take 25 mg by mouth 2 times daily 7/21/2021 at pm Yes Unknown, Entered By History   potassium chloride (MICRO-K) 10 MEQ CR capsule Take 10 mEq by mouth 2 times daily Morning & Noon. 7/21/2021 at 1200 Yes Reported, Patient   PRAVASTATIN SODIUM PO Take 40 mg by mouth At Bedtime  7/21/2021 at hs Yes Reported, Patient   VITAMIN D, CHOLECALCIFEROL, PO Take 2,000 Units by mouth daily  7/21/2021 at am Yes Unknown, Entered By History       The information provided in this note is only as accurate as the sources available at the time of update(s)

## 2021-07-23 ENCOUNTER — APPOINTMENT (OUTPATIENT)
Dept: GENERAL RADIOLOGY | Facility: CLINIC | Age: 86
DRG: 394 | End: 2021-07-23
Attending: PHYSICIAN ASSISTANT
Payer: COMMERCIAL

## 2021-07-23 ENCOUNTER — APPOINTMENT (OUTPATIENT)
Dept: PHYSICAL THERAPY | Facility: CLINIC | Age: 86
DRG: 394 | End: 2021-07-23
Attending: STUDENT IN AN ORGANIZED HEALTH CARE EDUCATION/TRAINING PROGRAM
Payer: COMMERCIAL

## 2021-07-23 LAB
ANION GAP SERPL CALCULATED.3IONS-SCNC: 4 MMOL/L (ref 3–14)
BUN SERPL-MCNC: 17 MG/DL (ref 7–30)
CALCIUM SERPL-MCNC: 8.4 MG/DL (ref 8.5–10.1)
CHLORIDE BLD-SCNC: 105 MMOL/L (ref 94–109)
CO2 SERPL-SCNC: 31 MMOL/L (ref 20–32)
CREAT SERPL-MCNC: 0.93 MG/DL (ref 0.52–1.04)
ERYTHROCYTE [DISTWIDTH] IN BLOOD BY AUTOMATED COUNT: 12.7 % (ref 10–15)
GFR SERPL CREATININE-BSD FRML MDRD: 55 ML/MIN/1.73M2
GLUCOSE BLD-MCNC: 137 MG/DL (ref 70–99)
HCT VFR BLD AUTO: 42.7 % (ref 35–47)
HGB BLD-MCNC: 13.9 G/DL (ref 11.7–15.7)
LACTATE SERPL-SCNC: 1.6 MMOL/L (ref 0.7–2)
LACTATE SERPL-SCNC: 2.7 MMOL/L (ref 0.7–2)
MAGNESIUM SERPL-MCNC: 2.4 MG/DL (ref 1.6–2.3)
MCH RBC QN AUTO: 29.6 PG (ref 26.5–33)
MCHC RBC AUTO-ENTMCNC: 32.6 G/DL (ref 31.5–36.5)
MCV RBC AUTO: 91 FL (ref 78–100)
PHOSPHATE SERPL-MCNC: 3 MG/DL (ref 2.5–4.5)
PLATELET # BLD AUTO: 210 10E3/UL (ref 150–450)
POTASSIUM BLD-SCNC: 2.9 MMOL/L (ref 3.4–5.3)
POTASSIUM BLD-SCNC: 2.9 MMOL/L (ref 3.4–5.3)
RBC # BLD AUTO: 4.7 10E6/UL (ref 3.8–5.2)
SODIUM SERPL-SCNC: 140 MMOL/L (ref 133–144)
WBC # BLD AUTO: 5.2 10E3/UL (ref 4–11)

## 2021-07-23 PROCEDURE — 97162 PT EVAL MOD COMPLEX 30 MIN: CPT | Mod: GP | Performed by: PHYSICAL THERAPIST

## 2021-07-23 PROCEDURE — 250N000011 HC RX IP 250 OP 636: Performed by: INTERNAL MEDICINE

## 2021-07-23 PROCEDURE — 99222 1ST HOSP IP/OBS MODERATE 55: CPT | Performed by: SURGERY

## 2021-07-23 PROCEDURE — 250N000009 HC RX 250: Performed by: PHYSICIAN ASSISTANT

## 2021-07-23 PROCEDURE — 80048 BASIC METABOLIC PNL TOTAL CA: CPT | Performed by: STUDENT IN AN ORGANIZED HEALTH CARE EDUCATION/TRAINING PROGRAM

## 2021-07-23 PROCEDURE — 85027 COMPLETE CBC AUTOMATED: CPT | Performed by: STUDENT IN AN ORGANIZED HEALTH CARE EDUCATION/TRAINING PROGRAM

## 2021-07-23 PROCEDURE — 120N000001 HC R&B MED SURG/OB

## 2021-07-23 PROCEDURE — 250N000011 HC RX IP 250 OP 636: Performed by: STUDENT IN AN ORGANIZED HEALTH CARE EDUCATION/TRAINING PROGRAM

## 2021-07-23 PROCEDURE — 97110 THERAPEUTIC EXERCISES: CPT | Mod: GP | Performed by: PHYSICAL THERAPIST

## 2021-07-23 PROCEDURE — 83735 ASSAY OF MAGNESIUM: CPT | Performed by: STUDENT IN AN ORGANIZED HEALTH CARE EDUCATION/TRAINING PROGRAM

## 2021-07-23 PROCEDURE — 97116 GAIT TRAINING THERAPY: CPT | Mod: GP | Performed by: PHYSICAL THERAPIST

## 2021-07-23 PROCEDURE — 36415 COLL VENOUS BLD VENIPUNCTURE: CPT | Performed by: STUDENT IN AN ORGANIZED HEALTH CARE EDUCATION/TRAINING PROGRAM

## 2021-07-23 PROCEDURE — 250N000011 HC RX IP 250 OP 636: Performed by: PHYSICIAN ASSISTANT

## 2021-07-23 PROCEDURE — 99207 PR APP CREDIT; MD BILLING SHARED VISIT: CPT | Performed by: PHYSICIAN ASSISTANT

## 2021-07-23 PROCEDURE — C9113 INJ PANTOPRAZOLE SODIUM, VIA: HCPCS | Performed by: PHYSICIAN ASSISTANT

## 2021-07-23 PROCEDURE — 99232 SBSQ HOSP IP/OBS MODERATE 35: CPT | Performed by: INTERNAL MEDICINE

## 2021-07-23 PROCEDURE — 97530 THERAPEUTIC ACTIVITIES: CPT | Mod: GP | Performed by: PHYSICAL THERAPIST

## 2021-07-23 PROCEDURE — 120N000013 HC R&B IMCU

## 2021-07-23 PROCEDURE — 84100 ASSAY OF PHOSPHORUS: CPT | Performed by: STUDENT IN AN ORGANIZED HEALTH CARE EDUCATION/TRAINING PROGRAM

## 2021-07-23 PROCEDURE — 83605 ASSAY OF LACTIC ACID: CPT | Performed by: INTERNAL MEDICINE

## 2021-07-23 PROCEDURE — 36415 COLL VENOUS BLD VENIPUNCTURE: CPT | Performed by: INTERNAL MEDICINE

## 2021-07-23 PROCEDURE — 74018 RADEX ABDOMEN 1 VIEW: CPT

## 2021-07-23 RX ORDER — METOPROLOL TARTRATE 1 MG/ML
2.5 INJECTION, SOLUTION INTRAVENOUS EVERY 6 HOURS
Status: DISCONTINUED | OUTPATIENT
Start: 2021-07-23 | End: 2021-07-25

## 2021-07-23 RX ORDER — POTASSIUM CHLORIDE 7.45 MG/ML
10 INJECTION INTRAVENOUS ONCE
Status: COMPLETED | OUTPATIENT
Start: 2021-07-23 | End: 2021-07-23

## 2021-07-23 RX ORDER — POTASSIUM CHLORIDE 7.45 MG/ML
10 INJECTION INTRAVENOUS
Status: DISPENSED | OUTPATIENT
Start: 2021-07-23 | End: 2021-07-23

## 2021-07-23 RX ORDER — LABETALOL HYDROCHLORIDE 5 MG/ML
10 INJECTION, SOLUTION INTRAVENOUS
Status: DISCONTINUED | OUTPATIENT
Start: 2021-07-23 | End: 2021-07-23

## 2021-07-23 RX ORDER — HYDRALAZINE HYDROCHLORIDE 20 MG/ML
10 INJECTION INTRAMUSCULAR; INTRAVENOUS EVERY 4 HOURS PRN
Status: DISCONTINUED | OUTPATIENT
Start: 2021-07-23 | End: 2021-07-29 | Stop reason: HOSPADM

## 2021-07-23 RX ADMIN — POTASSIUM CHLORIDE 10 MEQ: 7.46 INJECTION, SOLUTION INTRAVENOUS at 16:12

## 2021-07-23 RX ADMIN — POTASSIUM CHLORIDE 10 MEQ: 7.46 INJECTION, SOLUTION INTRAVENOUS at 01:02

## 2021-07-23 RX ADMIN — SODIUM CHLORIDE, SODIUM LACTATE, POTASSIUM CHLORIDE, CALCIUM CHLORIDE AND DEXTROSE MONOHYDRATE: 5; 600; 310; 30; 20 INJECTION, SOLUTION INTRAVENOUS at 18:29

## 2021-07-23 RX ADMIN — POTASSIUM CHLORIDE 10 MEQ: 7.46 INJECTION, SOLUTION INTRAVENOUS at 02:41

## 2021-07-23 RX ADMIN — POTASSIUM CHLORIDE 10 MEQ: 7.46 INJECTION, SOLUTION INTRAVENOUS at 13:30

## 2021-07-23 RX ADMIN — PIPERACILLIN SODIUM AND TAZOBACTAM SODIUM 3.38 G: 3; .375 INJECTION, POWDER, LYOPHILIZED, FOR SOLUTION INTRAVENOUS at 04:32

## 2021-07-23 RX ADMIN — PANTOPRAZOLE SODIUM 40 MG: 40 INJECTION, POWDER, FOR SOLUTION INTRAVENOUS at 06:47

## 2021-07-23 RX ADMIN — METOPROLOL TARTRATE 2.5 MG: 5 INJECTION INTRAVENOUS at 14:34

## 2021-07-23 RX ADMIN — METOPROLOL TARTRATE 2.5 MG: 5 INJECTION INTRAVENOUS at 20:15

## 2021-07-23 RX ADMIN — PIPERACILLIN SODIUM AND TAZOBACTAM SODIUM 3.38 G: 3; .375 INJECTION, POWDER, LYOPHILIZED, FOR SOLUTION INTRAVENOUS at 09:12

## 2021-07-23 RX ADMIN — DIATRIZOATE MEGLUMINE AND DIATRIZOATE SODIUM 90 ML: 660; 100 SOLUTION ORAL; RECTAL at 14:34

## 2021-07-23 RX ADMIN — POTASSIUM CHLORIDE 10 MEQ: 7.46 INJECTION, SOLUTION INTRAVENOUS at 14:57

## 2021-07-23 RX ADMIN — POTASSIUM CHLORIDE 10 MEQ: 7.46 INJECTION, SOLUTION INTRAVENOUS at 21:13

## 2021-07-23 RX ADMIN — POTASSIUM CHLORIDE 10 MEQ: 7.46 INJECTION, SOLUTION INTRAVENOUS at 11:59

## 2021-07-23 RX ADMIN — HYDRALAZINE HYDROCHLORIDE 10 MG: 20 INJECTION INTRAMUSCULAR; INTRAVENOUS at 18:26

## 2021-07-23 RX ADMIN — POTASSIUM CHLORIDE 10 MEQ: 7.46 INJECTION, SOLUTION INTRAVENOUS at 09:12

## 2021-07-23 ASSESSMENT — ACTIVITIES OF DAILY LIVING (ADL)
ADLS_ACUITY_SCORE: 13
ADLS_ACUITY_SCORE: 12
ADLS_ACUITY_SCORE: 13
ADLS_ACUITY_SCORE: 12
ADLS_ACUITY_SCORE: 13
ADLS_ACUITY_SCORE: 13

## 2021-07-23 ASSESSMENT — MIFFLIN-ST. JEOR: SCORE: 1174.75

## 2021-07-23 NOTE — PROVIDER NOTIFICATION
"Brief update:    Paged re: hypertension w/ elevated diastolic.    Vital signs:  Temp: 98.3  F (36.8  C) Temp src: Oral BP: (!) 140/87 Pulse: 98   Resp: 18 SpO2: 93 % O2 Device: None (Room air)   Height: 151.8 cm (4' 11.76\") Weight: 87.5 kg (193 lb)  Estimated body mass index is 37.99 kg/m  as calculated from the following:    Height as of this encounter: 1.518 m (4' 11.76\").    Weight as of this encounter: 87.5 kg (193 lb).     Looks to be aberrant value/inaccurate read with elevated diastolic of approximately 140 as on recheck blood pressure is 140/87.    I did add IV labetalol for systolic blood pressure greater than 180; note that patient's blood pressure has improved following 3.5 L out via NG tube, and discomfort associated with gastric distention/obstruction was likely cause of her presenting hypertension.    Jorge Stokes MD  2:50 AM    "

## 2021-07-23 NOTE — PLAN OF CARE
IMC. Hypertensive, diastolic elevated, MD notified. 2L applied while sleeping for sats 88%. Tele sinus dysrhythmia. A&O x4. Denies pain. Denies nausea. NG tube in place with brown returns. Abdomen obese, soft. Voiding in bathroom. Up with assist of 1. Potassium replaced overnight. Patient c/o IV site burning with potassium infusion so infusion rate was slowed. Recheck at 0530. Plan to continue to monitor.

## 2021-07-23 NOTE — PROGRESS NOTES
"General Surgery Progress Note    Admission Date: 7/22/2021  Today's Date: 7/23/2021         Assessment:      Deborah Higginbotham is a 89 year old female with large ventral hernia and small bowel obstruction.         Plan:   Medicine following and managing her hypokalemia and BP issues.      Pain: None but Dilaudid available.  Will add PO Tramadol.  Bowel: awaiting return of bowel function.  Continue PPI.  Antibiotics: on Zosyn but may be able to stop this.  Diet: NPO with NGT.  Plan for Gastrografin challenge this evening with abdominal XR in am.  IVFs: D5% in LR at 75mL/hr  DVT prophylaxis: PCDs  Dispo: home in 1-2 days when tolerating diet, full ROBF.        Interval History:   Had a pretty good night.  Still no pain.  Reports passing a little flatus.  NGT is irritating.          Physical Exam:   BP (!) 170/92   Pulse 76   Temp 98.4  F (36.9  C) (Oral)   Resp 17   Ht 1.518 m (4' 11.76\")   Wt 83.2 kg (183 lb 6.8 oz)   SpO2 97%   BMI 36.11 kg/m    I/O last 3 completed shifts:  In: 1095 [I.V.:1095]  Out: 1550 [Urine:600; Emesis/NG output:950]  General: NAD, pleasant, alert and oriented x3  Pulm: breathing comfortably  Abdomen: soft, non tender, non distended.  The more firm area on lower medial edge of hernia is now soft.    LABS:  All laboratory and imaging data in the past 24 hours reviewed.  WBC wnl  Hgb: 13.9  K+: 2.9, being replaced        Adolfo Thacker PA-C  Pager: [phone]  [facility]: [phone]       "

## 2021-07-23 NOTE — PROGRESS NOTES
07/23/21 1000   Quick Adds   Type of Visit Initial PT Evaluation   Living Environment   People in home alone   Current Living Arrangements independent living facility   Home Accessibility no concerns   Transportation Anticipated family or friend will provide   Self-Care   Usual Activity Tolerance moderate   Current Activity Tolerance fair   Equipment Currently Used at Home grab bar, tub/shower;cane, straight   Activity/Exercise/Self-Care Comment dtr present and suportive providing PLOF and home environment info also, stating pt not making total sense today   Disability/Function   Concentrating, Remembering or Making Decisions Difficulty yes   Walking or Climbing Stairs Difficulty yes   Walking or Climbing Stairs ambulation difficulty, requires equipment   Dressing/Bathing Difficulty no   Toileting issues no   Doing Errands Independently Difficulty (such as shopping) yes   Fall history within last six months no   General Information   Onset of Illness/Injury or Date of Surgery 07/22/21   Referring Physician Stu Stanford   Patient/Family Therapy Goals Statement (PT) Pt would like to go, seems agreeable to TCU, dtr wants TCU   Pertinent History of Current Problem (include personal factors and/or comorbidities that impact the POC) Deborah Higginbotham is a 89 year old female admitted on 7/22/2021. She is admitted with SBO and elevated lactate.   Existing Precautions/Restrictions fall;cardiac   Weight-Bearing Status - LUE full weight-bearing   Weight-Bearing Status - RUE full weight-bearing   Weight-Bearing Status - LLE full weight-bearing   Weight-Bearing Status - RLE full weight-bearing   Cognition   Orientation Status (Cognition) oriented x 3   Affect/Mental Status (Cognition) WFL   Follows Commands (Cognition) does not follow one-step commands;75-90% accuracy   Safety Deficit (Cognition) awareness of need for assistance;insight into deficits/self-awareness;judgment;problem-solving;safety precautions  awareness;safety precautions follow-through/compliance   Memory Deficit (Cognition) short-term memory   Pain Assessment   Patient Currently in Pain No   Range of Motion (ROM)   ROM Quick Adds ROM WFL   Strength   Strength Comments decreased strength and mm endurance, min A x 1 sit to stand from low chair   Bed Mobility   Comment (Bed Mobility) bed mob with SBA supine<>sit   Transfers   Transfer Safety Comments sit to stand with min A and FWW, needs UE support   Gait/Stairs (Locomotion)   Bill[place] Level (Gait) contact guard   Assistive Device (Gait) walker, front-wheeled   Distance in Feet (Required for LE Total Joints) 300'   Pattern (Gait) swing-through   Deviations/Abnormal Patterns (Gait) pierce decreased;gait speed decreased   Balance   Balance Comments decreased standing static and dynamic balance requires B UE support and SBA/CGA   Clinical Impression   Criteria for Skilled Therapeutic Intervention yes, treatment indicated   PT Diagnosis (PT) difficulty walking   Influenced by the following impairments decreased strength, balance, act trish, dizziness, cardiac concerns   Functional limitations due to impairments impaired functional mob I and safety   Clinical Presentation Evolving/Changing   Clinical Presentation Rationale 3-5 deficits   Clinical Decision Making (Complexity) moderate complexity   Therapy Frequency (PT) 5x/week   Predicted Duration of Therapy Intervention (days/wks) 3 days   Planned Therapy Interventions (PT) bed mobility training;gait training;strengthening;stair training;transfer training   Risk & Benefits of therapy have been explained evaluation/treatment results reviewed;care plan/treatment goals reviewed;risks/benefits reviewed;current/potential barriers reviewed;participants voiced agreement with care plan;participants included;patient;daughter   PT Discharge Planning    PT Discharge Recommendation (DC Rec) Transitional Care Facility   PT Rationale for DC Rec Pt below baseline and  will benefit from cont therapies to improve strength and balance inorder to progress functional mob I and safety to allow return to I living    PT Brief overview of current status  pt currently requires min A for sit to stand iwth FWW, CGA for gait with FWW, 2 min LOB with CGA to recover.   Total Evaluation Time   Total Evaluation Time (Minutes) 15

## 2021-07-23 NOTE — PROGRESS NOTES
Physician Attestation   I, Eitan Mcdaniel, saw and evaluated Deborah Higginbotham as part of a shared visit.  I have reviewed and discussed with the advanced practice provider their history, physical and plan.    I personally reviewed the vital signs, medications, labs and imaging.    My key history or physical exam findings: Reports her pain has been improving and she now has been passing gas.  Primary complaint is discomfort from NG tube.  On exam appears comfortable at rest.  Abdomen soft, nontender.    Key management decisions made by me:   - Symptoms improving, passing gas, abdominal pain improving   - General surgery following   - Gastrografin challenge planned per surgery  - Continue NPO, NG to LIS   - Continue IVF  - Lactic acid improving, recheck this afternoon  - Discontinue piperacillin-tazobactam IV   - Metoprolol IV scheduled until tolerating oral. Continue IV PRN medications.    Eitan Mcdaniel  Date of Service (when I saw the patient): 07/23/21

## 2021-07-23 NOTE — PROGRESS NOTES
[facility]    Medicine History and Physical - Hospitalist Service       Date of Admission:  7/22/2021    Assessment & Plan   Deborah Higginbotham is a 89 year old female admitted on 7/22/2021. PMHx BCa and HTN who presented with abd distension, n/v x 2 days found to have elevated lactic acid and SBO.    Small bowel obstruction.  Large ventral hernia. Seems to describe hx repair with mesh.  CT demonstrates a large abdominal hernia which has been noted previously, and multiple dilated loops of bowel.  She had NG placed with approximately 3.5 L of output immediately.  - Appreciate general surgery consult.   - Planning gastrografin challenge this evening.  - Possibly able to remove NG if tolerates clamping trial with GG challenge.  - Cont D5 LR 75ml/hr.  - PRN pain and anti-emetics.  - Therapy evals.    Lactic acidosis.  Reports large emesis just prior to adm and had 3.5L immediate output from NG upon placement. 5.4->4.5 with 2L fluid resuscitation in ED. Empirically started on Zosyn out of concern for bowel ischemia / bacterial translocation from gut.  - Lactic acid remains mildly elevated and clinically patient improving. Afebrile and stable vitals with notable marked HTN. Discontinue empiric Zosyn.  - Blood ctx 7/22 NGTD.    Hyponatremia. - Improved with hydration.  Hypokalemia.  In context emesis and PTA Lasix.  - Replacing K+ per protocol through IV given NPO with NG in place.    Accelerated HTN.  HTN.  -190s/90-110s. Reports baseline SBPs generally 150s. Missed PTA meds as NPO and no PRNs ordered.  - Schedule IV metoprolol 2.5mg Q6H while NPO.  - Discontinue IV beta blocker and resume PTA Lopressor 25mg BID once NG removed.  - Note hydralazine 25mg QID was discontinued in May d/t complaints of dizziness with activity that is described in note as feeling like she was on a boat.  at that office visit.   - Suspect she needs additional agent. Monitor.  - Resume PTA Lasix 40mg when  no longer on IVF.  - PRN hydralazine avail.    HLD. Hold statin while NPO.    Diet: NPO for Medical/Clinical Reasons Except for: Ice Chips    Ferraro Catheter: Not present    DVT Prophylaxis: Pneumatic Compression Devices  Code Status: No CPR- Do NOT Intubate    Expected discharge: 07/27/2021 recommended to TBD once ROBF, tolerating PO, and therapy evals.    The patient's care was discussed with the Attending Physician, Dr. Mcdaniel, Bedside Nurse and Patient.    JoAnna K. Barthell, PA-C  Hospitalist Service  [facility]    ______________________________________________________________________    Interval History   Feels fine. Denies pain. Passing flatus today. Hungry. No nausea. NG in. Blood pressure elevated.    Data reviewed today: I reviewed all medications, new labs and imaging results over the last 24 hours. I personally reviewed no images or EKG's today.    Physical Exam   Vital Signs: Temp: 97.2  F (36.2  C) Temp src: Oral BP: 114/88 Pulse: 82   Resp: 23 SpO2: 95 % O2 Device: None (Room air) Oxygen Delivery: 2 LPM  Weight: 183 lbs 6.76 oz  Constitutional: Appears stated age, no acute distress. A&Ox3.  Respiratory: Breath sounds CTA. No increased work of breathing on 2L via NC.  Cardiovascular: RRR, no rub or murmur. No peripheral edema.  GI: Soft, obese, non-tender. Mild-mod distension. Bowel sounds present.  Skin: Warm, dry, no rashes or lesions.    Medications     dextrose 5% lactated ringers 75 mL/hr at 07/22/21 1745       diatrizoate meglumine-sodium  90 mL Oral Once     pantoprazole  40 mg Intravenous QAM AC     piperacillin-tazobactam  3.375 g Intravenous Q6H     potassium chloride  10 mEq Intravenous Q1H     sodium chloride (PF)  3 mL Intracatheter Q8H       Data   Recent Labs   Lab 07/23/21  0647 07/22/21  1825 07/22/21  0738   WBC 5.2  --  7.1   HGB 13.9  --  16.7*   MCV 91  --  87     --  302     --  129*  131*   POTASSIUM 2.9*  2.9* 3.1* 4.1  3.9   CHLORIDE  105  --  94   CO2 31  --  25   BUN 17  --  24   CR 0.93  --  0.91   ANIONGAP 4  --  12   ARABELLA 8.4*  --  9.9   *  --  198*   ALBUMIN  --   --  4.3   PROTTOTAL  --   --  8.0   BILITOTAL  --   --  1.1  1.1   ALKPHOS  --   --  59  56   ALT  --   --  30  27   AST  --   --  32  21   LIPASE  --   --  74       Imaging:  No results found for this or any previous visit (from the past 24 hour(s)).

## 2021-07-23 NOTE — PLAN OF CARE
IMC status. A&O, forgetful at times. VSS on RA ex htn at times, md aware, scheduled and prns available. Tele sinus dysrhythmia. Lung sounds clear. Bowel sounds audible, +flatus, -bm. Urine output adequate. Gastroview given, NG tube clamped for 4 hrs per order. Denies nausea and abdominal pain. Plans to go to xray this evening. NPO. Up w/ 1 assist. Denies pain. Potassium replaced today, patient complained of IV burning so infusion rate slowed.

## 2021-07-24 ENCOUNTER — APPOINTMENT (OUTPATIENT)
Dept: GENERAL RADIOLOGY | Facility: CLINIC | Age: 86
DRG: 394 | End: 2021-07-24
Attending: SURGERY
Payer: COMMERCIAL

## 2021-07-24 LAB
MAGNESIUM SERPL-MCNC: 2.5 MG/DL (ref 1.6–2.3)
POTASSIUM BLD-SCNC: 3.3 MMOL/L (ref 3.4–5.3)
POTASSIUM BLD-SCNC: 3.4 MMOL/L (ref 3.4–5.3)

## 2021-07-24 PROCEDURE — 120N000001 HC R&B MED SURG/OB

## 2021-07-24 PROCEDURE — 250N000011 HC RX IP 250 OP 636: Performed by: STUDENT IN AN ORGANIZED HEALTH CARE EDUCATION/TRAINING PROGRAM

## 2021-07-24 PROCEDURE — 120N000013 HC R&B IMCU

## 2021-07-24 PROCEDURE — 999N000065 XR ABDOMEN 1 VIEW

## 2021-07-24 PROCEDURE — 250N000011 HC RX IP 250 OP 636: Performed by: INTERNAL MEDICINE

## 2021-07-24 PROCEDURE — C9113 INJ PANTOPRAZOLE SODIUM, VIA: HCPCS | Performed by: PHYSICIAN ASSISTANT

## 2021-07-24 PROCEDURE — 84132 ASSAY OF SERUM POTASSIUM: CPT | Performed by: STUDENT IN AN ORGANIZED HEALTH CARE EDUCATION/TRAINING PROGRAM

## 2021-07-24 PROCEDURE — 36415 COLL VENOUS BLD VENIPUNCTURE: CPT | Performed by: INTERNAL MEDICINE

## 2021-07-24 PROCEDURE — 250N000013 HC RX MED GY IP 250 OP 250 PS 637: Performed by: STUDENT IN AN ORGANIZED HEALTH CARE EDUCATION/TRAINING PROGRAM

## 2021-07-24 PROCEDURE — 250N000009 HC RX 250: Performed by: PHYSICIAN ASSISTANT

## 2021-07-24 PROCEDURE — 250N000011 HC RX IP 250 OP 636: Performed by: PHYSICIAN ASSISTANT

## 2021-07-24 PROCEDURE — 36415 COLL VENOUS BLD VENIPUNCTURE: CPT | Performed by: STUDENT IN AN ORGANIZED HEALTH CARE EDUCATION/TRAINING PROGRAM

## 2021-07-24 PROCEDURE — 99232 SBSQ HOSP IP/OBS MODERATE 35: CPT | Performed by: INTERNAL MEDICINE

## 2021-07-24 PROCEDURE — 250N000013 HC RX MED GY IP 250 OP 250 PS 637: Performed by: INTERNAL MEDICINE

## 2021-07-24 PROCEDURE — 99207 PR MOONLIGHTING INDICATOR: CPT | Performed by: INTERNAL MEDICINE

## 2021-07-24 PROCEDURE — 258N000003 HC RX IP 258 OP 636: Performed by: INTERNAL MEDICINE

## 2021-07-24 PROCEDURE — 99231 SBSQ HOSP IP/OBS SF/LOW 25: CPT | Performed by: SURGERY

## 2021-07-24 PROCEDURE — 84132 ASSAY OF SERUM POTASSIUM: CPT | Performed by: INTERNAL MEDICINE

## 2021-07-24 PROCEDURE — 83735 ASSAY OF MAGNESIUM: CPT | Performed by: STUDENT IN AN ORGANIZED HEALTH CARE EDUCATION/TRAINING PROGRAM

## 2021-07-24 RX ORDER — OXYCODONE HYDROCHLORIDE 5 MG/1
5 TABLET ORAL EVERY 4 HOURS PRN
Status: DISCONTINUED | OUTPATIENT
Start: 2021-07-24 | End: 2021-07-29 | Stop reason: HOSPADM

## 2021-07-24 RX ORDER — POTASSIUM CHLORIDE 7.45 MG/ML
10 INJECTION INTRAVENOUS
Status: COMPLETED | OUTPATIENT
Start: 2021-07-24 | End: 2021-07-24

## 2021-07-24 RX ORDER — ACETAMINOPHEN 325 MG/1
325 TABLET ORAL EVERY 4 HOURS PRN
Status: DISCONTINUED | OUTPATIENT
Start: 2021-07-24 | End: 2021-07-29 | Stop reason: HOSPADM

## 2021-07-24 RX ORDER — POTASSIUM CHLORIDE 1500 MG/1
40 TABLET, EXTENDED RELEASE ORAL ONCE
Status: COMPLETED | OUTPATIENT
Start: 2021-07-24 | End: 2021-07-24

## 2021-07-24 RX ADMIN — POTASSIUM CHLORIDE 10 MEQ: 7.46 INJECTION, SOLUTION INTRAVENOUS at 02:26

## 2021-07-24 RX ADMIN — METOPROLOL TARTRATE 2.5 MG: 5 INJECTION INTRAVENOUS at 08:01

## 2021-07-24 RX ADMIN — POTASSIUM CHLORIDE 10 MEQ: 7.46 INJECTION, SOLUTION INTRAVENOUS at 08:00

## 2021-07-24 RX ADMIN — METOPROLOL TARTRATE 2.5 MG: 5 INJECTION INTRAVENOUS at 02:26

## 2021-07-24 RX ADMIN — PANTOPRAZOLE SODIUM 40 MG: 40 INJECTION, POWDER, FOR SOLUTION INTRAVENOUS at 06:32

## 2021-07-24 RX ADMIN — POTASSIUM CHLORIDE 10 MEQ: 7.46 INJECTION, SOLUTION INTRAVENOUS at 05:43

## 2021-07-24 RX ADMIN — ACETAMINOPHEN 325 MG: 325 TABLET, FILM COATED ORAL at 11:34

## 2021-07-24 RX ADMIN — POTASSIUM CHLORIDE 10 MEQ: 7.46 INJECTION, SOLUTION INTRAVENOUS at 04:07

## 2021-07-24 RX ADMIN — POTASSIUM CHLORIDE 40 MEQ: 1500 TABLET, EXTENDED RELEASE ORAL at 20:59

## 2021-07-24 RX ADMIN — METOPROLOL TARTRATE 2.5 MG: 5 INJECTION INTRAVENOUS at 14:01

## 2021-07-24 RX ADMIN — METOPROLOL TARTRATE 2.5 MG: 5 INJECTION INTRAVENOUS at 19:55

## 2021-07-24 RX ADMIN — POTASSIUM CHLORIDE: 2 INJECTION, SOLUTION, CONCENTRATE INTRAVENOUS at 14:05

## 2021-07-24 RX ADMIN — HYDRALAZINE HYDROCHLORIDE 10 MG: 20 INJECTION INTRAMUSCULAR; INTRAVENOUS at 06:32

## 2021-07-24 ASSESSMENT — ACTIVITIES OF DAILY LIVING (ADL)
ADLS_ACUITY_SCORE: 12

## 2021-07-24 NOTE — PLAN OF CARE
IMC. A&Ox4, forgetful @ times. VSS on RA. Assist of 1. NPO ex ice chips. Lung sounds clear. Bowel sounds active, + flatus, multiple loose/watery stools. NG clamped. Adequate urine output. Large ventral hernia. Denies n/v. Denies pain. Potassium replaced- rate slowed d/t slight discomfort. Hydralazine given x1.

## 2021-07-24 NOTE — PROGRESS NOTES
"General Surgery Progress Note    Admission Date: 7/22/2021 07/24/21          Assessment:      Deborah Higginbotham is a 89 year old female with large ventral hernia and small bowel obstruction managed conservatively with NG and gastroview challenge. She has had return of bowel function.         Plan:   Medicine following and managing her hypokalemia and BP issues.      Pain: PRN tylenol, oxycodone, dilaudid  Bowel: S/p administration of gastrograffin yesterday, having BMs. Obtain abdomen XR this morning, if contrast present in colon then will plan to pull NG. Continue PPI.  Antibiotics: No antibiotics  Diet: NPO with NGT.  If NG pulled today, then start clear liquid diet  IVFs: D5% in LR at 75mL/hr  DVT prophylaxis: PCDs  Dispo: home in 1-2 days when tolerating diet, full ROBF.        Interval History:   No issues overnight. States she is having many bowel movements now. Hopeful to have NG removed soon. No nausea or emesis, no abdominal pain, no dyspnea or chest pain.          Physical Exam:   BP (!) 148/93   Pulse 71   Temp 98.5  F (36.9  C) (Oral)   Resp 17   Ht 1.518 m (4' 11.76\")   Wt 83.2 kg (183 lb 6.8 oz)   SpO2 96%   BMI 36.11 kg/m    I/O last 3 completed shifts:  In: 863.75 [I.V.:863.75]  Out: 1050 [Urine:900; Emesis/NG output:150]  General: NAD, pleasant, alert and oriented x3  HEENT: NG in place draining brown fluid  Pulm: breathing comfortably on room air  Abdomen: soft, non tender, non distended.      LABS:  All laboratory and imaging data in the past 24 hours reviewed.  WBC wnl  K+: 2.5, being replaced        Teodoro Fuentes MD   7/24/2021 at 10:44 AM  General Surgery - PGY4  [phone]           "

## 2021-07-24 NOTE — PROGRESS NOTES
[facility]  Hospitalist Progress Note for 7/24/2021       Assessment and Plan:   Deborah Higginbotham is a 89 year old female admitted on 7/22/2021. PMHx BCa and HTN who presented with abd distension, n/v x 2 days found to have elevated lactic acid and SBO.     Small bowel obstruction.  Large ventral hernia. Seems to describe hx repair with mesh.  CT demonstrates a large abdominal hernia which has been noted previously, and multiple dilated loops of bowel.  She had NG placed with approximately 3.5 L of output immediately.  - Appreciate general surgery consult  - plan per surgery, NPO, NGT per surgery  - change IVF  from D5 LR 75ml/hr.-> D5NS with 20 meq KCL  - PRN pain and anti-emetics.  - Therapy evals.     Lactic acidosis.  Reports large emesis just prior to adm and had 3.5L immediate output from NG upon placement. 5.4->4.5 with 2L fluid resuscitation in ED. Empirically started on Zosyn out of concern for bowel ischemia / bacterial translocation from gut.  - Lactic acid improved, afebrile , nl WBC  - Discontinued empiric Zosyn on 7/23  - Blood ctx 7/22 NGTD.  - discontinued IMC orders     Hyponatremia. - Improved with hydration.  Hypokalemia.  Hypomagnesemia  In context emesis and PTA Lasix.  - change IVF to D5.9 NS with 20 Meq KCL at 75 ml/hr to help improve K  - Replacing K+ & Mg per protocol through IV given NPO with NG in place.     Accelerated HTN.  HTN.   Reports baseline SBPs generally 150s. Missed PTA meds as NPO and no PRNs ordered.  - BP improving but still uncontrolled & labile 148-190s/90s.  - on schedule IV metoprolol 2.5mg Q6H while NPO.  - plan to discontinue IV beta blocker and resume PTA Lopressor 25mg BID once NG removed.  - Note hydralazine 25mg QID was discontinued in May d/t complaints of dizziness with activity   - Suspect she needs additional agent that the pt has not had a previous problem(will discuss with daughter in AM)  - Resume PTA Lasix 40mg when no longer on IVF & if  "needed.  - cont PRN hydralazine avail.     HLD. Hold statin while NPO.     Diet: NPO for Medical/Clinical Reasons Except for: Ice Chips     Ferraro Catheter: Not present     DVT Prophylaxis: Pneumatic Compression Devices  Code Status: No CPR- Do NOT Intubate     Expected discharge: 2021 recommended to TBD once ROBF, tolerating PO, and therapy evals.    Kristen Hernandez MD.  Hospitalist [phone] (7am -6 pm)               Interval History:   NGT out this AM, cl liq started per surgery              Medications:       metoprolol  2.5 mg Intravenous Q6H     pantoprazole  40 mg Intravenous QAM AC     sodium chloride (PF)  3 mL Intracatheter Q8H     acetaminophen, bisacodyl, hydrALAZINE, HYDROmorphone, lidocaine 4%, lidocaine (buffered or not buffered), melatonin, naloxone **OR** naloxone **OR** naloxone **OR** naloxone, nitroGLYcerin, ondansetron **OR** ondansetron, oxyCODONE, prochlorperazine **OR** prochlorperazine **OR** prochlorperazine, sodium chloride (PF)               Physical Exam:   Blood pressure (!) 148/93, pulse 71, temperature 98.5  F (36.9  C), temperature source Oral, resp. rate 17, height 1.518 m (4' 11.76\"), weight 83.2 kg (183 lb 6.8 oz), SpO2 96 %.  Wt Readings from Last 4 Encounters:   21 83.2 kg (183 lb 6.8 oz)   10/04/18 83 kg (183 lb)   18 83.9 kg (185 lb)   18 83.9 kg (185 lb)         Vital Sign Ranges  Temperature Temp  Av.4  F (36.9  C)  Min: 98.2  F (36.8  C)  Max: 98.5  F (36.9  C)   Blood pressure Systolic (24hrs), Av , Min:145 , Max:201        Diastolic (24hrs), Av, Min:86, Max:120      Pulse Pulse  Av.1  Min: 62  Max: 95   Respirations Resp  Av.5  Min: 11  Max: 26   Pulse oximetry SpO2  Av.1 %  Min: 86 %  Max: 98 %         Intake/Output Summary (Last 24 hours) at 2021 1130  Last data filed at 2021 0600  Gross per 24 hour   Intake 863.75 ml   Output 500 ml   Net 363.75 ml       Constitutional: Awake, alert, cooperative, no " apparent distress   Lungs: Clear to auscultation bilaterally, no crackles or wheezing   Cardiovascular: Regular rate and rhythm, normal S1 and S2, and no murmur noted   Abdomen: Normal bowel sounds, soft, non-distended, non-tender   Skin: No rashes, no cyanosis, large bilat legs, trace leg edema   Neuro:                Data:   All laboratory data reviewed

## 2021-07-25 LAB
ANION GAP SERPL CALCULATED.3IONS-SCNC: 2 MMOL/L (ref 3–14)
BUN SERPL-MCNC: 13 MG/DL (ref 7–30)
CALCIUM SERPL-MCNC: 8.5 MG/DL (ref 8.5–10.1)
CHLORIDE BLD-SCNC: 113 MMOL/L (ref 94–109)
CO2 SERPL-SCNC: 25 MMOL/L (ref 20–32)
CREAT SERPL-MCNC: 0.79 MG/DL (ref 0.52–1.04)
GFR SERPL CREATININE-BSD FRML MDRD: 67 ML/MIN/1.73M2
GLUCOSE BLD-MCNC: 104 MG/DL (ref 70–99)
MAGNESIUM SERPL-MCNC: 2.3 MG/DL (ref 1.6–2.3)
POTASSIUM BLD-SCNC: 4 MMOL/L (ref 3.4–5.3)
SODIUM SERPL-SCNC: 140 MMOL/L (ref 133–144)

## 2021-07-25 PROCEDURE — 250N000011 HC RX IP 250 OP 636: Performed by: STUDENT IN AN ORGANIZED HEALTH CARE EDUCATION/TRAINING PROGRAM

## 2021-07-25 PROCEDURE — 250N000011 HC RX IP 250 OP 636: Performed by: INTERNAL MEDICINE

## 2021-07-25 PROCEDURE — 99232 SBSQ HOSP IP/OBS MODERATE 35: CPT | Performed by: INTERNAL MEDICINE

## 2021-07-25 PROCEDURE — 250N000013 HC RX MED GY IP 250 OP 250 PS 637: Performed by: INTERNAL MEDICINE

## 2021-07-25 PROCEDURE — C9113 INJ PANTOPRAZOLE SODIUM, VIA: HCPCS | Performed by: PHYSICIAN ASSISTANT

## 2021-07-25 PROCEDURE — 250N000009 HC RX 250: Performed by: PHYSICIAN ASSISTANT

## 2021-07-25 PROCEDURE — 99231 SBSQ HOSP IP/OBS SF/LOW 25: CPT | Performed by: SURGERY

## 2021-07-25 PROCEDURE — 258N000003 HC RX IP 258 OP 636: Performed by: INTERNAL MEDICINE

## 2021-07-25 PROCEDURE — 83735 ASSAY OF MAGNESIUM: CPT | Performed by: INTERNAL MEDICINE

## 2021-07-25 PROCEDURE — 250N000011 HC RX IP 250 OP 636: Performed by: PHYSICIAN ASSISTANT

## 2021-07-25 PROCEDURE — 120N000001 HC R&B MED SURG/OB

## 2021-07-25 PROCEDURE — 80048 BASIC METABOLIC PNL TOTAL CA: CPT | Performed by: INTERNAL MEDICINE

## 2021-07-25 PROCEDURE — 99207 PR MOONLIGHTING INDICATOR: CPT | Performed by: INTERNAL MEDICINE

## 2021-07-25 PROCEDURE — 36415 COLL VENOUS BLD VENIPUNCTURE: CPT | Performed by: INTERNAL MEDICINE

## 2021-07-25 RX ORDER — METOPROLOL TARTRATE 25 MG/1
25 TABLET, FILM COATED ORAL 2 TIMES DAILY
Status: DISCONTINUED | OUTPATIENT
Start: 2021-07-25 | End: 2021-07-29 | Stop reason: HOSPADM

## 2021-07-25 RX ORDER — HYDRALAZINE HYDROCHLORIDE 10 MG/1
10 TABLET, FILM COATED ORAL EVERY 8 HOURS SCHEDULED
Status: DISCONTINUED | OUTPATIENT
Start: 2021-07-25 | End: 2021-07-26

## 2021-07-25 RX ADMIN — HYDRALAZINE HYDROCHLORIDE 10 MG: 10 TABLET ORAL at 21:23

## 2021-07-25 RX ADMIN — ONDANSETRON 4 MG: 4 TABLET, ORALLY DISINTEGRATING ORAL at 21:23

## 2021-07-25 RX ADMIN — HYDRALAZINE HYDROCHLORIDE 10 MG: 20 INJECTION INTRAMUSCULAR; INTRAVENOUS at 21:20

## 2021-07-25 RX ADMIN — METOPROLOL TARTRATE 25 MG: 25 TABLET, FILM COATED ORAL at 21:23

## 2021-07-25 RX ADMIN — HYDRALAZINE HYDROCHLORIDE 10 MG: 10 TABLET ORAL at 14:07

## 2021-07-25 RX ADMIN — METOPROLOL TARTRATE 2.5 MG: 5 INJECTION INTRAVENOUS at 01:48

## 2021-07-25 RX ADMIN — METOPROLOL TARTRATE 25 MG: 25 TABLET, FILM COATED ORAL at 08:46

## 2021-07-25 RX ADMIN — PANTOPRAZOLE SODIUM 40 MG: 40 INJECTION, POWDER, FOR SOLUTION INTRAVENOUS at 06:58

## 2021-07-25 RX ADMIN — HYDRALAZINE HYDROCHLORIDE 10 MG: 20 INJECTION INTRAMUSCULAR; INTRAVENOUS at 12:33

## 2021-07-25 RX ADMIN — POTASSIUM CHLORIDE: 2 INJECTION, SOLUTION, CONCENTRATE INTRAVENOUS at 06:54

## 2021-07-25 ASSESSMENT — ACTIVITIES OF DAILY LIVING (ADL)
ADLS_ACUITY_SCORE: 12

## 2021-07-25 NOTE — CONSULTS
"NUTRITION EDUCATION      REASON FOR ASSESSMENT:  Provider Order - low fiber diet after SBO 2 weeks    NUTRITION HISTORY:  Information obtained from patient.  She notes that she lives in an independent living facility where her meals are prepared for her - \"except for Sunday\".  Patient eats English muffin toast in her apartment in the morning and then dinner in the dining cornejo.  She notes that she never eats hoffmann and has recently been introduced to peanut butter.  \"I grew up in [place] and never had peanut butter.  But my granddaughter has recently introduced me to peanut butter and now I've been eating it like crazy.  I dip my apples in it\".      CURRENT DIET:  Low fiber     NUTRITION DIAGNOSIS:  Food- and nutrition-related knowledge deficit R/t no previous low fiber diet education AEB above history     INTERVENTIONS:    Nutrition Prescription:  Low fiber diet x 2 weeks     Implementation:      *  Nutrition Education (Content):   A)  Provided handout on Tips for Low Fiber diet    B)  Discussed above       *  Nutrition Education (Application):   A)  Discussed current eating habits and recommended alternative food choices      *  Anticipate good compliance      *  Diet Education - refer to Education Flowsheet    Goals:      *  Patient will verbalize understanding of diet by asking appropriate questions       *  All of the above goals met during the education session    Follow Up/Monitoring:      *  Provided RD contact information for future questions      *  Recommended Out-Patient Nutrition Referral, if further diet instructions are needed    Sahara Hanson RD, LD, CNSC   Clinical Dietitian - [facility]             "

## 2021-07-25 NOTE — PLAN OF CARE
Alert and oriented x4. Vital signs stable on room air. Assist of 1 GBW. Tolerating clear liquid diet. Lung sounds clear. Bowel sounds active, passing flatus, loose BM during shift, adequate urine output. Denies pain. Denies nausea. Tele NSR.

## 2021-07-25 NOTE — PLAN OF CARE
A&O, forgetful at times. VSS on RA ex htn at times, md aware, scheduled and prns available. Tele NSR. Lung sounds clear. Bowel sounds audible, +flatus, +bm. Urine output adequate. Tolerating low fiber diet. Denies nausea and abdominal pain. Up w/ 1 assist. Denies pain. Compression stockings refused. Possible discharge pending placement.

## 2021-07-25 NOTE — PROGRESS NOTES
[facility]  Hospitalist Progress Note for 7/25/2021       Assessment and Plan:   Deborah Higginbotham is a 89 year old female admitted on 7/22/2021. PMHx BCa and HTN who presented with abd distension, n/v x 2 days found to have elevated lactic acid and SBO.     Small bowel obstruction.  Large ventral hernia. Seems to describe hx repair with mesh.  CT demonstrates a large abdominal hernia which has been noted previously, and multiple dilated loops of bowel.  She had NG placed with approximately 3.5 L of output immediately.  - Appreciate general surgery consult  - plan per surgery,had NGT,removed 7/24, off  IVF & diet advanced to low fiber today  - PRN pain and anti-emetics.  - Therapy evals.     Lactic acidosis.  Reports large emesis just prior to adm and had 3.5L immediate output from NG upon placement. 5.4->4.5 with 2L fluid resuscitation in ED. Empirically started on Zosyn out of concern for bowel ischemia / bacterial translocation from gut.  - Lactic acid improved, afebrile , nl WBC  - Discontinued empiric Zosyn on 7/23  - Blood ctx 7/22 NGTD.  - discontinued IMC orders     Hyponatremia.resolved  Hypokalemia.resolved  Hypomagnesemia- resolved   PTA on Lasix.  - off IVF, on po diet.   - Hx ch lower leg edema. Declines TEDS. Pt advised to keep legs elevated & increase ambulation  -  may need to resume pTA lasix at discharge.     Accelerated HTN.  HTN.   Reports baseline SBPs generally 150s.PTA on Lasix 20 mg/day, Lopressor 25 mg/bid. PHx being tried on multiple diff medications.  Note hydralazine 25mg QID was discontinued in May d/t complaints of dizziness with activity   - BP uncontrolled,  - had been on IV metoprolol 2.5mg Q6H while NPO.  - 7/25- changed back to PTA po Metoprolol 25 mg bid + resumed Hydralazine 10 mg Q8hrs & titrate dose/frequency up as needed  - cont PRN hydralazine avail.     HLD. Hold statin while NPO.     Diet: restarted po diet with low fiber on7/25      Ferraro Catheter: Not  "present     DVT Prophylaxis: Pneumatic Compression Devices  Code Status: No CPR- Do NOT Intubate     Expected discharge: 2021 recommended to TBD once ROBF, tolerating PO, and therapy evarline Hernandez MD.  Hospitalist [phone] (7am -6 pm)               Interval History:   NGT out, on cl liq x yest- changed to low fiber  Changed IV metoprolol to PTA po metoprolol this AM + added po hydralazine              Medications:       metoprolol tartrate  25 mg Oral BID     pantoprazole  40 mg Intravenous QAM AC     sodium chloride (PF)  3 mL Intracatheter Q8H     acetaminophen, bisacodyl, hydrALAZINE, HYDROmorphone, lidocaine 4%, lidocaine (buffered or not buffered), melatonin, naloxone **OR** naloxone **OR** naloxone **OR** naloxone, nitroGLYcerin, ondansetron **OR** ondansetron, oxyCODONE, prochlorperazine **OR** prochlorperazine **OR** prochlorperazine, sodium chloride (PF)               Physical Exam:   Blood pressure (!) 164/74, pulse 67, temperature 98.1  F (36.7  C), temperature source Oral, resp. rate 18, height 1.518 m (4' 11.76\"), weight 83.2 kg (183 lb 6.8 oz), SpO2 93 %.  Wt Readings from Last 4 Encounters:   21 83.2 kg (183 lb 6.8 oz)   10/04/18 83 kg (183 lb)   18 83.9 kg (185 lb)   18 83.9 kg (185 lb)         Vital Sign Ranges  Temperature Temp  Av.8  F (36.6  C)  Min: 97.4  F (36.3  C)  Max: 98.1  F (36.7  C)   Blood pressure Systolic (24hrs), Av , Min:138 , Max:177        Diastolic (24hrs), Av, Min:74, Max:120      Pulse Pulse  Av.3  Min: 62  Max: 90   Respirations Resp  Av  Min: 15  Max: 21   Pulse oximetry SpO2  Av.4 %  Min: 93 %  Max: 98 %       No intake or output data in the 24 hours ending 21 0722    Constitutional: Awake, alert, cooperative, no apparent distress   Lungs: Clear to auscultation bilaterally, no crackles or wheezing   Cardiovascular: Regular rate and rhythm, normal S1 and S2, and no murmur noted   Abdomen: Obese , " soft ,non-tender.Normal bowel sounds   Skin: No rashes, no cyanosis, trace bilat lower leg edema               Data:   All laboratory data reviewed

## 2021-07-25 NOTE — PLAN OF CARE
IMC status discontinued. A&O, forgetful at times. VSS on RA ex htn at times, md aware, scheduled and prns available. Tele NSR. Lung sounds clear. Bowel sounds audible, +flatus, +bm. Urine output adequate.NG tube removed. Denies nausea and abdominal pain. Clear liquid diet. Up w/ 1 assist. Denies pain. Pt showered this shift. Potassium replaced today, Recheck tomorrow AM. Compression stockings ordered.

## 2021-07-25 NOTE — PROGRESS NOTES
"General Surgery Progress Note    Admission Date: 7/22/2021 07/25/21          Assessment:      Deborah Higginbotham is a 89 year old female with large ventral hernia and small bowel obstruction managed conservatively with NG and gastroview challenge. She has had return of bowel function, NG removed yesterday         Plan:   Medicine following and managing her hypokalemia and BP issues.      Pain: PRN tylenol, oxycodone, dilaudid  Bowel: NG removed yesterday after successful clamping trial. Continue PPI.  Antibiotics: No antibiotics  Diet: Tolerating clear liquid diet, advance to full liquid diet today  IVFs: Ok to discontinue IV fluids now that tolerating PO liquids  DVT prophylaxis: PCDs  Dispo: home in 1-2 days when tolerating diet, full ROBF.        Interval History:   NG removed yesterday, no worsening nausea or emesis. States she feels much better now that NG out. Having multiple bowel movements, no abdominal pain, no fevers or chills. Hopeful that she can be discharged in next several days          Physical Exam:   BP (!) 164/74 (BP Location: Right arm)   Pulse 67   Temp 98.1  F (36.7  C) (Oral)   Resp 18   Ht 1.518 m (4' 11.76\")   Wt 83.2 kg (183 lb 6.8 oz)   SpO2 93%   BMI 36.11 kg/m    I/O last 3 completed shifts:  In: 863.75 [I.V.:863.75]  Out: -   General: NAD, pleasant, alert and oriented x3  HEENT: Normocephalic  Pulm: breathing comfortably on room air  Abdomen: soft, non tender, non distended.      LABS:  All laboratory and imaging data in the past 24 hours reviewed.  K 3.3 yesterday, being replaced        Teodoro Fuentes MD   7/24/2021 at 10:44 AM  General Surgery - PGY4  [phone]           "

## 2021-07-26 ENCOUNTER — APPOINTMENT (OUTPATIENT)
Dept: OCCUPATIONAL THERAPY | Facility: CLINIC | Age: 86
DRG: 394 | End: 2021-07-26
Attending: INTERNAL MEDICINE
Payer: COMMERCIAL

## 2021-07-26 PROCEDURE — 97165 OT EVAL LOW COMPLEX 30 MIN: CPT | Mod: GO

## 2021-07-26 PROCEDURE — 99231 SBSQ HOSP IP/OBS SF/LOW 25: CPT | Performed by: PHYSICIAN ASSISTANT

## 2021-07-26 PROCEDURE — 250N000013 HC RX MED GY IP 250 OP 250 PS 637: Performed by: INTERNAL MEDICINE

## 2021-07-26 PROCEDURE — 97530 THERAPEUTIC ACTIVITIES: CPT | Mod: GO

## 2021-07-26 PROCEDURE — 97535 SELF CARE MNGMENT TRAINING: CPT | Mod: GO

## 2021-07-26 PROCEDURE — 99232 SBSQ HOSP IP/OBS MODERATE 35: CPT | Performed by: INTERNAL MEDICINE

## 2021-07-26 PROCEDURE — 120N000001 HC R&B MED SURG/OB

## 2021-07-26 PROCEDURE — C9113 INJ PANTOPRAZOLE SODIUM, VIA: HCPCS | Performed by: PHYSICIAN ASSISTANT

## 2021-07-26 PROCEDURE — 250N000011 HC RX IP 250 OP 636: Performed by: PHYSICIAN ASSISTANT

## 2021-07-26 PROCEDURE — 99207 PR CDG-MDM COMPONENT: MEETS MODERATE - DOWN CODED: CPT | Performed by: INTERNAL MEDICINE

## 2021-07-26 RX ORDER — POLYETHYLENE GLYCOL 3350 17 G/17G
1 POWDER, FOR SOLUTION ORAL DAILY PRN
DISCHARGE
Start: 2021-07-26 | End: 2021-07-29

## 2021-07-26 RX ORDER — ONDANSETRON 4 MG/1
4 TABLET, ORALLY DISINTEGRATING ORAL EVERY 6 HOURS PRN
DISCHARGE
Start: 2021-07-26 | End: 2021-07-29

## 2021-07-26 RX ORDER — PANTOPRAZOLE SODIUM 40 MG/1
40 TABLET, DELAYED RELEASE ORAL
Status: DISCONTINUED | OUTPATIENT
Start: 2021-07-26 | End: 2021-07-29 | Stop reason: HOSPADM

## 2021-07-26 RX ORDER — ACETAMINOPHEN 325 MG/1
650 TABLET ORAL EVERY 4 HOURS PRN
DISCHARGE
Start: 2021-07-26 | End: 2021-07-29

## 2021-07-26 RX ORDER — FUROSEMIDE 40 MG
40 TABLET ORAL DAILY
Status: DISCONTINUED | OUTPATIENT
Start: 2021-07-26 | End: 2021-07-29 | Stop reason: HOSPADM

## 2021-07-26 RX ADMIN — FUROSEMIDE 40 MG: 40 TABLET ORAL at 10:17

## 2021-07-26 RX ADMIN — HYDRALAZINE HYDROCHLORIDE 10 MG: 10 TABLET ORAL at 06:42

## 2021-07-26 RX ADMIN — PANTOPRAZOLE SODIUM 40 MG: 40 TABLET, DELAYED RELEASE ORAL at 10:17

## 2021-07-26 RX ADMIN — METOPROLOL TARTRATE 25 MG: 25 TABLET, FILM COATED ORAL at 21:44

## 2021-07-26 RX ADMIN — METOPROLOL TARTRATE 25 MG: 25 TABLET, FILM COATED ORAL at 08:38

## 2021-07-26 RX ADMIN — PANTOPRAZOLE SODIUM 40 MG: 40 INJECTION, POWDER, FOR SOLUTION INTRAVENOUS at 06:42

## 2021-07-26 ASSESSMENT — ACTIVITIES OF DAILY LIVING (ADL)
ADLS_ACUITY_SCORE: 16
ADLS_ACUITY_SCORE: 13
ADLS_ACUITY_SCORE: 13

## 2021-07-26 NOTE — PLAN OF CARE
Current Problem: HTN  Management Provided: Prn and scheduled hydralazine gie, scheduled metotprolol given as ordered. Pain addressed during rounds. Toiletinfg needs addressed.  Patient response: BP went down to 176/95 from 198/111. Endorsed to nurse incoming to monitor and recheck.

## 2021-07-26 NOTE — PLAN OF CARE
[id number]: Alert and oriented x4. Vital signs stable on room air- hypertensive at times. Assist of 1 GBW. Tolerating low fiber diet. Lung sounds clear. Bowel sounds active, passing flatus, no BM during shift, adequate urine output. Denies pain. Denies nausea. Tele NSR with PVC's.

## 2021-07-26 NOTE — CONSULTS
Care Management Initial Consult    General Information  Assessment completed with: Patient, Children, Daughter Briana  Type of CM/SW Visit: Initial Assessment    Primary Care Provider verified and updated as needed: Yes   Readmission within the last 30 days:        Reason for Consult: discharge planning  Advance Care Planning:            Communication Assessment  Patient's communication style: spoken language (English or Bilingual)             Cognitive  Cognitive/Neuro/Behavioral: WDL  Level of Consciousness: alert     Orientation: oriented x 4             Living Environment:   People in home: alone     Current living Arrangements: independent living facility      Able to return to prior arrangements:         Family/Social Support:  Care provided by:    Provides care for:       Children          Description of Support System: Supportive, Involved         Current Resources:   Patient receiving home care services:       Community Resources:    Equipment currently used at home: grab bar, tub/shower, cane, straight  Supplies currently used at home:      Employment/Financial:  Employment Status: retired        Financial Concerns:             Lifestyle & Psychosocial Needs:  Social Determinants of Health     Tobacco Use: Medium Risk     Smoking Tobacco Use: Former Smoker     Smokeless Tobacco Use: Never Used   Alcohol Use:      Frequency of Alcohol Consumption:      Average Number of Drinks:      Frequency of Binge Drinking:    Financial Resource Strain:      Difficulty of Paying Living Expenses:    Food Insecurity:      Worried About Running Out of Food in the Last Year:      Ran Out of Food in the Last Year:    Transportation Needs:      Lack of Transportation (Medical):      Lack of Transportation (Non-Medical):    Physical Activity:      Days of Exercise per Week:      Minutes of Exercise per Session:    Stress:      Feeling of Stress :    Social Connections:      Frequency of Communication with Friends and Family:       Frequency of Social Gatherings with Friends and Family:      Attends Druze Services:      Active Member of Clubs or Organizations:      Attends Club or Organization Meetings:      Marital Status:    Intimate Partner Violence:      Fear of Current or Ex-Partner:      Emotionally Abused:      Physically Abused:      Sexually Abused:    Depression:      PHQ-2 Score:    Housing Stability:      Unable to Pay for Housing in the Last Year:      Number of Places Lived in the Last Year:      Unstable Housing in the Last Year:        Functional Status:  Prior to admission patient needed assistance:              Mental Health Status:  Mental Health Status: No Current Concerns       Chemical Dependency Status:  Chemical Dependency Status: No Current Concerns             Values/Beliefs:  Spiritual, Cultural Beliefs, Druze Practices, Values that affect care:                 Additional Information:  Referrals sent DOD.  Pt has not been to TCU in the past.  [employer] insurance.     ADDENDUM: SW spoke to Gonzalo, who is full for today.  Spoke to admissions at [facility], and they do not have any openings today but may consider pt for a bed tomorrow.  Daughter updated that we do not yet have placement.     Jody Don, LICSW

## 2021-07-26 NOTE — PROGRESS NOTES
[facility]    Medicine Progress Note - Hospitalist Service       Date of Admission:  7/22/2021    Assessment & Plan   Deborah Higginbotham is a 89 year old female with hx of ventral hernia s/p prior repair and HTN who was admitted on 7/22/2021 for a small bowel obstruction    Small bowel obstruction, Resolved  Large ventral hernia  Presented with progressive abdominal distension, nausea, and vomiting. CT abd/pelvis on arrival showed large abdominal hernia without evidence of strangulation and multiple dilated loops of bowel. General Surgery was consulted on admission and recommended medical management. She was initiated on bowel rest with supportive cares including NG tube placement with improvement.   - Tolerating low fiber diet    Lactic acidosis, Resolved  Lactate 5.4 on arrival. On admission, she was empirically initiated on IV pip-tazo out of concern for bowel ischemia vs bacterial translocation from the gut, but this was discontinued within 24 hours. Lactate returned to normal with IV fluids    Hyponatremia, Resolved  Hypokalemia, Resolved  Hypomagnesemia, Resolved  Resolved with supportive cares. Electrolytes were replaced per protocol  - On Lasix PTA; resume PTA potassium supplement at discharge    Essential hypertension  * PTA: metoprolol 25 mg BID, Lasix 40 mg daily  * Blood pressure typically 150s systolic; 170s systolic on average while hospitalized  - Continues on PTA meds, anticipate that blood pressures will gradually return to baseline as she continues to recover from acute illness    Hyperlipidemia  - Resume PTA pravastatin at discharge      Diet: Low Fiber Diet  Advance Diet as Tolerated    DVT Prophylaxis: Pneumatic Compression Devices  Ferraro Catheter: Not present  Code Status: No CPR- Do NOT Intubate         Disposition: Expected discharge to TCU once bed available, medically ready    The patient's care was discussed with the Care Coordinator/, Patient and  Patient's Family.    Nat Park MD  Hospitalist Service  [facility]  Contact information available via [facility] Paging/Directory    ______________________________________________________________________    Interval History   Abdominal discomfort and nausea overnight - resolved with medications. Offers no acute complaints this morning, eager to eat breakfast. Denies cp/sob, dizziness/lightheadedness, nausea, or pain elsewhere. Awaiting TCU - referrals need to be sent.     Time Spent on this Encounter   I spent 35 minutes on the unit/floor managing the care of Deborah Higginbotham. Over 50% of my time was spent on the following:   - Counseling the patient and/or family regarding: diagnostic results, prognosis, risks and benefits of treatment options and recommended follow-up  - Coordination of care with the: care coordinator/    Nat Park MD    Data reviewed today: I reviewed all medications, new labs and imaging results over the last 24 hours. I personally reviewed no images or EKG's today.    Physical Exam   Vital Signs: Temp: 98.4  F (36.9  C) Temp src: Oral BP: (!) 177/87 Pulse: 63   Resp: 20 SpO2: 95 % O2 Device: None (Room air)    Weight: 183 lbs 6.76 oz  Constitutional: Appears comfortable, NAD  HEENT: Sclera white, MMM  Respiratory: Breathing non-labored. Lungs CTAB - no wheezes, crackles, or rhonchi  Cardiovascular: Heart RRR, no m/r/g. 1+ BLE edema  GI: +BS, abd soft/NT  Skin/Integument: No rash  Musculoskeletal: Normal muscle bulk and tone  Neuro: Alert and appropriate, OLSEN  Psych: Calm and cooperative    Data   Recent Labs   Lab 07/25/21  0651 07/24/21  1911 07/24/21  0032 07/23/21  0647 07/22/21  0738   WBC  --   --   --  5.2 7.1   HGB  --   --   --  13.9 16.7*   MCV  --   --   --  91 87   PLT  --   --   --  210 302     --   --  140 129*  131*   POTASSIUM 4.0 3.3* 3.4 2.9*  2.9* 4.1  3.9   CHLORIDE 113*  --   --  105 94   CO2 25  --   --  31  25   BUN 13  --   --  17 24   CR 0.79  --   --  0.93 0.91   ANIONGAP 2*  --   --  4 12   ARABELLA 8.5  --   --  8.4* 9.9   *  --   --  137* 198*   ALBUMIN  --   --   --   --  4.3   PROTTOTAL  --   --   --   --  8.0   BILITOTAL  --   --   --   --  1.1  1.1   ALKPHOS  --   --   --   --  59  56   ALT  --   --   --   --  30  27   AST  --   --   --   --  32  21   LIPASE  --   --   --   --  74         No results found for this or any previous visit (from the past 24 hour(s)).    Medications       furosemide  40 mg Oral Daily     metoprolol tartrate  25 mg Oral BID     pantoprazole  40 mg Oral QAM AC     sodium chloride (PF)  3 mL Intracatheter Q8H

## 2021-07-26 NOTE — PLAN OF CARE
Alert and oriented x4. VSS. Denies pain. Up SBA. Voiding well. Tele NSR. Tolerating diet. Passing gas. Denies nausea.  Plan to continue to monitor tonight.

## 2021-07-26 NOTE — PROGRESS NOTES
07/26/21 1125   Quick Adds   Type of Visit Initial Occupational Therapy Evaluation   Living Environment   People in home alone   Current Living Arrangements independent living facility   Home Accessibility no concerns   Transportation Anticipated family or friend will provide   Living Environment Comments pt has walk in shower w/ grab bars.    Self-Care   Equipment Currently Used at Home cane, straight   Activity/Exercise/Self-Care Comment pt ind w/ all ADL's. Has A for cleaning and goes to dining room for 1 meal/day   Disability/Function   Fall history within last six months no   Change in Functional Status Since Onset of Current Illness/Injury yes   General Information   Onset of Illness/Injury or Date of Surgery 07/22/21   Referring Physician Nat Park MD   Additional Occupational Profile Info/Pertinent History of Current Problem eDborah Higginbotham is a 89 year old female with large ventral hernia and small bowel obstruction managed conservatively with NG and gastroview challenge. She has had return of bowel function, NG removed 7/24, tolerating liquids and tried solids    Existing Precautions/Restrictions fall   Cognitive Status Examination   Orientation Status orientation to person, place and time   Pain Assessment   Patient Currently in Pain No   Transfers   Transfer Comments SBA   Activities of Daily Living   BADL Assessment bathing;grooming;lower body dressing;toileting   Bathing Assessment   [place] Level (Bathing) minimum assist (75% patient effort)   Lower Body Dressing Assessment   [place] Level (Lower Body Dressing) minimum assist (75% patient effort)   Grooming Assessment   [place] Level (Grooming) supervision   Toileting   [place] Level (Toileting) supervision   Clinical Impression   Criteria for Skilled Therapeutic Interventions Met (OT) yes   OT Diagnosis dec ind/safety w/ ADL's and IADL's   OT Problem List-Impairments impacting ADL activity tolerance  impaired;mobility   Assessment of Occupational Performance 1-3 Performance Deficits   Identified Performance Deficits dec ind/safety w/ dressing, toileting, functional mobility, IADL's   Planned Therapy Interventions (OT) ADL retraining;IADL retraining;progressive activity/exercise;home program guidelines   Clinical Decision Making Complexity (OT) low complexity   Therapy Frequency (OT) 3x/week   Predicted Duration of Therapy 6 days   Risk & Benefits of therapy have been explained evaluation/treatment results reviewed;care plan/treatment goals reviewed;patient;daughter   OT Discharge Planning    OT Discharge Recommendation (DC Rec) Transitional Care Facility   OT Rationale for DC Rec Pt just below functional baseline and would benefit from continued therapy at TCU to return to functional baseline prior to returning to ILF.   Total Evaluation Time (Minutes)   Total Evaluation Time (Minutes) 8

## 2021-07-26 NOTE — PROGRESS NOTES
"General Surgery Progress Note    Admission Date: 7/22/2021 07/26/21          Assessment:      Deborah Higginbotham is a 89 year old female with large ventral hernia and small bowel obstruction managed conservatively with NG and gastroview challenge. She has had return of bowel function, NG removed 7/24, tolerating liquids and tried solids          Plan:   Medicine following and managing her hypokalemia and BP issues.      Pain: PRN tylenol, oxycodone, dilaudid  Bowel: Continue regimen. Continue PPI.  Antibiotics: No antibiotics  Diet: Tolerating low fiber diet  IVFs: Off  DVT prophylaxis: PCDs  Dispo: TCU when bed found.  SW consulted.        Interval History:   Reports no abdominal pain.  Had slight nausea with advancing to solids, but otherwise felt fine.  Had loose BM today.  Ambulating well but feels weak.          Physical Exam:   BP (!) 177/87 (BP Location: Left arm)   Pulse 63   Temp 98.4  F (36.9  C) (Oral)   Resp 20   Ht 1.518 m (4' 11.76\")   Wt 83.2 kg (183 lb 6.8 oz)   SpO2 95%   BMI 36.11 kg/m    I/O last 3 completed shifts:  In: 300 [P.O.:300]  Out: -   General: NAD, pleasant, alert and oriented x3.  Up in chair talking with daughter.  HEENT: Normocephalic, moist mucus membranes  Pulm: breathing comfortably on room air  Abdomen: soft, non tender, non distended.      LABS:  All laboratory and imaging data in the past 24 hours reviewed.  K+ up to 4.0      Adolfo Thacker PA-C   [facility]  [phone]           "

## 2021-07-27 ENCOUNTER — APPOINTMENT (OUTPATIENT)
Dept: PHYSICAL THERAPY | Facility: CLINIC | Age: 86
DRG: 394 | End: 2021-07-27
Payer: COMMERCIAL

## 2021-07-27 LAB
BACTERIA BLD CULT: NO GROWTH
BACTERIA BLD CULT: NO GROWTH

## 2021-07-27 PROCEDURE — 250N000013 HC RX MED GY IP 250 OP 250 PS 637: Performed by: INTERNAL MEDICINE

## 2021-07-27 PROCEDURE — 99232 SBSQ HOSP IP/OBS MODERATE 35: CPT | Performed by: INTERNAL MEDICINE

## 2021-07-27 PROCEDURE — 120N000001 HC R&B MED SURG/OB

## 2021-07-27 PROCEDURE — 250N000011 HC RX IP 250 OP 636: Performed by: PHYSICIAN ASSISTANT

## 2021-07-27 PROCEDURE — 97750 PHYSICAL PERFORMANCE TEST: CPT | Mod: GP | Performed by: PHYSICAL THERAPIST

## 2021-07-27 PROCEDURE — 97116 GAIT TRAINING THERAPY: CPT | Mod: GP | Performed by: PHYSICAL THERAPIST

## 2021-07-27 PROCEDURE — 97530 THERAPEUTIC ACTIVITIES: CPT | Mod: GP | Performed by: PHYSICAL THERAPIST

## 2021-07-27 RX ADMIN — HYDRALAZINE HYDROCHLORIDE 10 MG: 20 INJECTION INTRAMUSCULAR; INTRAVENOUS at 06:42

## 2021-07-27 RX ADMIN — FUROSEMIDE 40 MG: 40 TABLET ORAL at 08:03

## 2021-07-27 RX ADMIN — METOPROLOL TARTRATE 25 MG: 25 TABLET, FILM COATED ORAL at 08:03

## 2021-07-27 RX ADMIN — METOPROLOL TARTRATE 25 MG: 25 TABLET, FILM COATED ORAL at 20:57

## 2021-07-27 RX ADMIN — PANTOPRAZOLE SODIUM 40 MG: 40 TABLET, DELAYED RELEASE ORAL at 06:42

## 2021-07-27 ASSESSMENT — ACTIVITIES OF DAILY LIVING (ADL)
ADLS_ACUITY_SCORE: 13

## 2021-07-27 NOTE — PROGRESS NOTES
[facility]    Medicine Progress Note - Hospitalist Service       Date of Admission:  7/22/2021    Assessment & Plan   Deborah Higginbotham is a 89 year old female with hx of ventral hernia s/p prior repair and HTN who was admitted on 7/22/2021 for a small bowel obstruction    Small bowel obstruction, Resolved  Large ventral hernia  Presented with progressive abdominal distension, nausea, and vomiting. CT abd/pelvis on arrival showed large abdominal hernia without evidence of strangulation and multiple dilated loops of bowel. General Surgery was consulted on admission and recommended medical management. She was initiated on bowel rest with supportive cares including NG tube placement with improvement.   - Tolerating low fiber diet    Lactic acidosis, Resolved  Lactate 5.4 on arrival. On admission, she was empirically initiated on IV pip-tazo out of concern for bowel ischemia vs bacterial translocation from the gut, but this was discontinued within 24 hours. Lactate returned to normal with IV fluids    Hyponatremia, Resolved  Hypokalemia, Resolved  Hypomagnesemia, Resolved  Resolved with supportive cares. Electrolytes were replaced per protocol  - On Lasix PTA; resume PTA potassium supplement at discharge    Essential hypertension  * PTA: metoprolol 25 mg BID, Lasix 40 mg daily  * Blood pressure typically 150s systolic; 170s systolic on average while hospitalized  - Continues on PTA meds, anticipate that blood pressures will gradually return to baseline as she continues to recover from acute illness    Hyperlipidemia  - Resume PTA pravastatin at discharge      Diet: Low Fiber Diet  Advance Diet as Tolerated    DVT Prophylaxis: Pneumatic Compression Devices  Ferraro Catheter: Not present  Code Status: No CPR- Do NOT Intubate         Disposition: Expected discharge to TCU once bed available, medically ready    The patient's care was discussed with the Patient.    Nat Park  MD  Hospitalist Service  [facility]  Contact information available via [facility] Paging/Directory    ______________________________________________________________________    Interval History   No events overnight. Tolerating PO. Denies cp/sob, dizziness/lightheadedness, or pain elsewhere. Awaiting TCU placement    Nat Park MD    Data reviewed today: I reviewed all medications, new labs and imaging results over the last 24 hours. I personally reviewed no images or EKG's today.    Physical Exam   Vital Signs: Temp: 98  F (36.7  C) Temp src: Oral BP: (!) 143/76 Pulse: 69   Resp: 16 SpO2: 96 % O2 Device: None (Room air)    Weight: 183 lbs 6.76 oz  Constitutional: Appears comfortable, NAD  HEENT: Sclera white, MMM  Respiratory: Breathing non-labored. Lungs CTAB - no wheezes, crackles, or rhonchi  Cardiovascular: Heart RRR, no m/r/g. Trace pedal edema  GI: +BS, abd soft/NT  Skin/Integument: No rash  Musculoskeletal: Normal muscle bulk and tone  Neuro: Alert and appropriate, OLSEN  Psych: Calm and cooperative    Data   Recent Labs   Lab 07/25/21  0651 07/24/21  1911 07/24/21  0032 07/23/21  0647 07/22/21  0738   WBC  --   --   --  5.2 7.1   HGB  --   --   --  13.9 16.7*   MCV  --   --   --  91 87   PLT  --   --   --  210 302     --   --  140 129*  131*   POTASSIUM 4.0 3.3* 3.4 2.9*  2.9* 4.1  3.9   CHLORIDE 113*  --   --  105 94   CO2 25  --   --  31 25   BUN 13  --   --  17 24   CR 0.79  --   --  0.93 0.91   ANIONGAP 2*  --   --  4 12   ARABELLA 8.5  --   --  8.4* 9.9   *  --   --  137* 198*   ALBUMIN  --   --   --   --  4.3   PROTTOTAL  --   --   --   --  8.0   BILITOTAL  --   --   --   --  1.1  1.1   ALKPHOS  --   --   --   --  59  56   ALT  --   --   --   --  30  27   AST  --   --   --   --  32  21   LIPASE  --   --   --   --  74         No results found for this or any previous visit (from the past 24 hour(s)).    Medications       furosemide  40 mg Oral Daily      metoprolol tartrate  25 mg Oral BID     pantoprazole  40 mg Oral QAM AC     sodium chloride (PF)  3 mL Intracatheter Q8H

## 2021-07-27 NOTE — PROGRESS NOTES
[place] Balance Scale (BBS) Cutoff Scores: A score of < 45/56 indicates an increased risk for falls.   Patient Score: 32/56    The BBS is a measure of static and dynamic standing balance that has been validated in community dwelling elderly individuals and individuals who have Parkinson's Disease, MS, and those who are s/p CVA and TBI. The test is administered without an assistive device. Scores from the [place]hani are used to determine the probability of falling based on the patient's previous history of falls and their test performance.     Minimal Detectable Change = 6.5   & Minimal Detectable Change (Parkinson's Disease) = 5 according to Palomo & Efraey 2008    Assessment (rationale for performing, application to patient s function & care plan): Patient continues to be high fall risk evidence by BBS score and needing to use walker during gait - pt's baseline did not need to use assistive device for safe ambulation. Continue to recommend PT to address functional limitations and decrease fall risk.  Minutes billed as physical performance test: 15       07/27/21 1000   Signing Clinician's Name / Credentials   Signing clinician's name / credentials Denise Baxter, PT, DPT    Lakhani Balance Scale (SHANTELLE CALLE, KATHIE S, JULES HAMILTON, NAYA, B: MEASURING BALANCE IN THE ELDERLY: VALIDATION OF AN INSTRUMENT. CAN. J. PUB. HEALTH, JULY/AUGUST SUPPLEMENT 2:S7-11, 1992.)   Sit To Stand 3   Standing Unsupported 4   Sitting Unsupported 4   Stand to Sit 3   Transfers 3   Standing with Eyes Closed 3   Standing Unsupported, Feet Together 2   Reach Forward With Outstretched Arm 2   Retrieve Object From Floor 3   Turning to Look Behind 2   Turn 360 Degrees 1   Placing Alternate Foot on Stool (4-6 inches) 1   Unsupported Tandem Stand (Demonstrate to Subject) 0   One Leg Stand 1   Total Score (A score of 45 or less has been correlated with an increased risk of falls)   Total Score (out of 56) 32

## 2021-07-27 NOTE — PROGRESS NOTES
Alert and oriented x4. Vital signs stable on RA, hypertensive. SBA, walker and gait belt. Tolerating low fiber diet. Lung sounds clear, equal bilaterally. No BM this shift. Adequate urine output. Bruising to R forearm. Denies pain and nausea. Tele NSR. IVs to R forearm leaking and unable to flush, IV replaced to R mid forearm.

## 2021-07-27 NOTE — PROGRESS NOTES
Care Management Follow Up    Length of Stay (days): 5    Expected Discharge Date: 07/27/2021     Concerns to be Addressed:       Patient plan of care discussed at interdisciplinary rounds: Yes    Anticipated Discharge Disposition:       Anticipated Discharge Services:    Anticipated Discharge DME:      Patient/family educated on Medicare website which has current facility and service quality ratings:    Education Provided on the Discharge Plan:    Patient/Family in Agreement with the Plan:      Referrals Placed by CM/SW:    Private pay costs discussed: Not applicable    Additional Information:  SW left a VM for [facility] regarding placement.       JESSIE Cardona

## 2021-07-28 ENCOUNTER — APPOINTMENT (OUTPATIENT)
Dept: PHYSICAL THERAPY | Facility: CLINIC | Age: 86
DRG: 394 | End: 2021-07-28
Payer: COMMERCIAL

## 2021-07-28 ENCOUNTER — APPOINTMENT (OUTPATIENT)
Dept: OCCUPATIONAL THERAPY | Facility: CLINIC | Age: 86
DRG: 394 | End: 2021-07-28
Payer: COMMERCIAL

## 2021-07-28 LAB
ANION GAP SERPL CALCULATED.3IONS-SCNC: 8 MMOL/L (ref 3–14)
BUN SERPL-MCNC: 14 MG/DL (ref 7–30)
CALCIUM SERPL-MCNC: 8.3 MG/DL (ref 8.5–10.1)
CHLORIDE BLD-SCNC: 107 MMOL/L (ref 94–109)
CO2 SERPL-SCNC: 22 MMOL/L (ref 20–32)
CREAT SERPL-MCNC: 0.83 MG/DL (ref 0.52–1.04)
ERYTHROCYTE [DISTWIDTH] IN BLOOD BY AUTOMATED COUNT: 12.9 % (ref 10–15)
GFR SERPL CREATININE-BSD FRML MDRD: 63 ML/MIN/1.73M2
GLUCOSE BLD-MCNC: 116 MG/DL (ref 70–99)
HCT VFR BLD AUTO: 39.6 % (ref 35–47)
HGB BLD-MCNC: 13.2 G/DL (ref 11.7–15.7)
MCH RBC QN AUTO: 30.1 PG (ref 26.5–33)
MCHC RBC AUTO-ENTMCNC: 33.3 G/DL (ref 31.5–36.5)
MCV RBC AUTO: 90 FL (ref 78–100)
PLATELET # BLD AUTO: 236 10E3/UL (ref 150–450)
POTASSIUM BLD-SCNC: 3 MMOL/L (ref 3.4–5.3)
POTASSIUM BLD-SCNC: 3.1 MMOL/L (ref 3.4–5.3)
POTASSIUM BLD-SCNC: 3.2 MMOL/L (ref 3.4–5.3)
RBC # BLD AUTO: 4.39 10E6/UL (ref 3.8–5.2)
SODIUM SERPL-SCNC: 137 MMOL/L (ref 133–144)
WBC # BLD AUTO: 9.7 10E3/UL (ref 4–11)

## 2021-07-28 PROCEDURE — 84132 ASSAY OF SERUM POTASSIUM: CPT | Performed by: STUDENT IN AN ORGANIZED HEALTH CARE EDUCATION/TRAINING PROGRAM

## 2021-07-28 PROCEDURE — 250N000013 HC RX MED GY IP 250 OP 250 PS 637: Performed by: INTERNAL MEDICINE

## 2021-07-28 PROCEDURE — 85027 COMPLETE CBC AUTOMATED: CPT | Performed by: INTERNAL MEDICINE

## 2021-07-28 PROCEDURE — 97530 THERAPEUTIC ACTIVITIES: CPT | Mod: GO

## 2021-07-28 PROCEDURE — 250N000013 HC RX MED GY IP 250 OP 250 PS 637: Performed by: STUDENT IN AN ORGANIZED HEALTH CARE EDUCATION/TRAINING PROGRAM

## 2021-07-28 PROCEDURE — 36415 COLL VENOUS BLD VENIPUNCTURE: CPT | Performed by: INTERNAL MEDICINE

## 2021-07-28 PROCEDURE — 80048 BASIC METABOLIC PNL TOTAL CA: CPT | Performed by: INTERNAL MEDICINE

## 2021-07-28 PROCEDURE — 36415 COLL VENOUS BLD VENIPUNCTURE: CPT | Performed by: STUDENT IN AN ORGANIZED HEALTH CARE EDUCATION/TRAINING PROGRAM

## 2021-07-28 PROCEDURE — 97116 GAIT TRAINING THERAPY: CPT | Mod: GP | Performed by: PHYSICAL THERAPIST

## 2021-07-28 PROCEDURE — 120N000001 HC R&B MED SURG/OB

## 2021-07-28 PROCEDURE — 97535 SELF CARE MNGMENT TRAINING: CPT | Mod: GO

## 2021-07-28 PROCEDURE — 99232 SBSQ HOSP IP/OBS MODERATE 35: CPT | Performed by: INTERNAL MEDICINE

## 2021-07-28 RX ORDER — POTASSIUM CHLORIDE 1500 MG/1
40 TABLET, EXTENDED RELEASE ORAL ONCE
Status: COMPLETED | OUTPATIENT
Start: 2021-07-28 | End: 2021-07-28

## 2021-07-28 RX ORDER — POTASSIUM CHLORIDE 1500 MG/1
20 TABLET, EXTENDED RELEASE ORAL ONCE
Status: COMPLETED | OUTPATIENT
Start: 2021-07-29 | End: 2021-07-29

## 2021-07-28 RX ADMIN — POTASSIUM CHLORIDE 40 MEQ: 1500 TABLET, EXTENDED RELEASE ORAL at 17:01

## 2021-07-28 RX ADMIN — METOPROLOL TARTRATE 25 MG: 25 TABLET, FILM COATED ORAL at 21:03

## 2021-07-28 RX ADMIN — POTASSIUM CHLORIDE 40 MEQ: 1500 TABLET, EXTENDED RELEASE ORAL at 22:27

## 2021-07-28 RX ADMIN — METOPROLOL TARTRATE 25 MG: 25 TABLET, FILM COATED ORAL at 08:20

## 2021-07-28 RX ADMIN — FUROSEMIDE 40 MG: 40 TABLET ORAL at 08:20

## 2021-07-28 RX ADMIN — PANTOPRAZOLE SODIUM 40 MG: 40 TABLET, DELAYED RELEASE ORAL at 07:45

## 2021-07-28 RX ADMIN — POTASSIUM CHLORIDE 40 MEQ: 1500 TABLET, EXTENDED RELEASE ORAL at 08:20

## 2021-07-28 ASSESSMENT — ACTIVITIES OF DAILY LIVING (ADL)
ADLS_ACUITY_SCORE: 13

## 2021-07-28 NOTE — PROGRESS NOTES
[facility]    Medicine Progress Note - Hospitalist Service       Date of Admission:  7/22/2021    Assessment & Plan   Deborah Higginbotham is a 89 year old female with hx of ventral hernia s/p prior repair and HTN who was admitted on 7/22/2021 for a small bowel obstruction    Small bowel obstruction, Resolved  Large ventral hernia  Presented with progressive abdominal distension, nausea, and vomiting. CT abd/pelvis on arrival showed large abdominal hernia without evidence of strangulation and multiple dilated loops of bowel. General Surgery was consulted on admission and recommended medical management. She was initiated on bowel rest with supportive cares including NG tube placement with improvement.   - Tolerating low fiber diet    Lactic acidosis, Resolved  Lactate 5.4 on arrival. On admission, she was empirically initiated on IV pip-tazo out of concern for bowel ischemia vs bacterial translocation from the gut, but this was discontinued within 24 hours. Lactate returned to normal with IV fluids    Hyponatremia, Resolved  Hypokalemia, Resolved  Hypomagnesemia, Resolved  Resolved with supportive cares. Electrolytes were replaced per protocol  - On Lasix PTA; resume PTA potassium supplement at discharge    Essential hypertension  * PTA: metoprolol 25 mg BID, Lasix 40 mg daily  * Blood pressure typically 150s systolic; 170s systolic on average while hospitalized  - Continues on PTA meds, anticipate that blood pressures will gradually return to baseline as she continues to recover from acute illness    Hyperlipidemia  - Resume PTA pravastatin at discharge      Diet: Low Fiber Diet  Advance Diet as Tolerated    DVT Prophylaxis: Pneumatic Compression Devices  Ferraro Catheter: Not present  Code Status: No CPR- Do NOT Intubate         Disposition: Expected discharge to TCU once bed available, medically ready    The patient's care was discussed with the Patient.    Nat Park  MD  Hospitalist Service  [facility]  Contact information available via [facility] Paging/Directory    ______________________________________________________________________    Interval History   No events overnight. Tolerating PO. Denies cp/sob, dizziness/lightheadedness, or pain elsewhere. Awaiting TCU placement    Nat Park MD    Data reviewed today: I reviewed all medications, new labs and imaging results over the last 24 hours. I personally reviewed no images or EKG's today.    Physical Exam   Vital Signs: Temp: 97.9  F (36.6  C) Temp src: Oral BP: 129/73 Pulse: 62   Resp: 20 SpO2: 95 % O2 Device: None (Room air)    Weight: 183 lbs 6.76 oz  Constitutional: Appears comfortable, NAD  HEENT: Sclera white, MMM  Respiratory: Breathing non-labored. Lungs CTAB - no wheezes, crackles, or rhonchi  Cardiovascular: Heart RRR, no m/r/g. Trace pedal edema  GI: +BS, abd soft/NT  Skin/Integument: No rash  Musculoskeletal: Normal muscle bulk and tone  Neuro: Alert and appropriate, OLSEN  Psych: Calm and cooperative    Data   Recent Labs   Lab 07/28/21  0646 07/25/21  0651 07/24/21  1911 07/23/21  0647 07/22/21  0738   WBC 9.7  --   --  5.2 7.1   HGB 13.2  --   --  13.9 16.7*   MCV 90  --   --  91 87     --   --  210 302    140  --  140 129*  131*   POTASSIUM 3.2* 4.0 3.3* 2.9*  2.9* 4.1  3.9   CHLORIDE 107 113*  --  105 94   CO2 22 25  --  31 25   BUN 14 13  --  17 24   CR 0.83 0.79  --  0.93 0.91   ANIONGAP 8 2*  --  4 12   ARABELLA 8.3* 8.5  --  8.4* 9.9   * 104*  --  137* 198*   ALBUMIN  --   --   --   --  4.3   PROTTOTAL  --   --   --   --  8.0   BILITOTAL  --   --   --   --  1.1  1.1   ALKPHOS  --   --   --   --  59  56   ALT  --   --   --   --  30  27   AST  --   --   --   --  32  21   LIPASE  --   --   --   --  74         No results found for this or any previous visit (from the past 24 hour(s)).    Medications       furosemide  40 mg Oral Daily     metoprolol tartrate   25 mg Oral BID     pantoprazole  40 mg Oral QAM AC     sodium chloride (PF)  3 mL Intracatheter Q8H

## 2021-07-28 NOTE — PLAN OF CARE
A&O x4. VSS on RA. Lung sounds clear. Tolerating low fiber diet. Bowel sounds active +flatus. Voiding adequately. Denies pain. Up with SBA.

## 2021-07-28 NOTE — PROGRESS NOTES
Care Management Follow Up    Length of Stay (days): 6    Expected Discharge Date: 07/29/2021     Concerns to be Addressed:       Patient plan of care discussed at interdisciplinary rounds: Yes    Anticipated Discharge Disposition:       Anticipated Discharge Services:    Anticipated Discharge DME:      Patient/family educated on Medicare website which has current facility and service quality ratings:    Education Provided on the Discharge Plan:    Patient/Family in Agreement with the Plan:      Referrals Placed by CM/SW:    Private pay costs discussed: Not applicable    Additional Information:  Informed pt was declined by [facility]. Pt has [employer] insurance which limits her options for placement. CARLOTTA discussed pt's lack of options for placement and presented her with a [employer] specific TCU list. Pt states since [facility] and [facility] do not have a bed there is no other option. There are 8 other TCUs pt could attend, but pt is declining. Pt requested SW call he daughter to discuss discharge placement. Pt states that the other 8 TCUs are too far out of her way from where her and her family live. CARLOTTA called Briana. Briana was not too keen on the 8 other facilities pt could be placed at. Briana reports she could have pt at her home, but she would not be home all the time. CARLOTTA offered [facility] as an option and Briana reports it is too far out of the way. Briana would like placement between [place] and [place]. Briana gave permission for CARLOTTA to send a referral to [facility] in [place]. CARLOTTA sent it via [facility].       JESSIE Cardona

## 2021-07-29 ENCOUNTER — APPOINTMENT (OUTPATIENT)
Dept: PHYSICAL THERAPY | Facility: CLINIC | Age: 86
DRG: 394 | End: 2021-07-29
Payer: COMMERCIAL

## 2021-07-29 VITALS
HEART RATE: 85 BPM | RESPIRATION RATE: 16 BRPM | DIASTOLIC BLOOD PRESSURE: 84 MMHG | SYSTOLIC BLOOD PRESSURE: 170 MMHG | TEMPERATURE: 98.1 F | WEIGHT: 183.42 LBS | BODY MASS INDEX: 36.01 KG/M2 | OXYGEN SATURATION: 95 % | HEIGHT: 60 IN

## 2021-07-29 LAB — POTASSIUM BLD-SCNC: 4.4 MMOL/L (ref 3.4–5.3)

## 2021-07-29 PROCEDURE — 36415 COLL VENOUS BLD VENIPUNCTURE: CPT | Performed by: INTERNAL MEDICINE

## 2021-07-29 PROCEDURE — 84132 ASSAY OF SERUM POTASSIUM: CPT | Performed by: INTERNAL MEDICINE

## 2021-07-29 PROCEDURE — 250N000013 HC RX MED GY IP 250 OP 250 PS 637: Performed by: INTERNAL MEDICINE

## 2021-07-29 PROCEDURE — 99239 HOSP IP/OBS DSCHRG MGMT >30: CPT | Performed by: INTERNAL MEDICINE

## 2021-07-29 PROCEDURE — 250N000013 HC RX MED GY IP 250 OP 250 PS 637: Performed by: STUDENT IN AN ORGANIZED HEALTH CARE EDUCATION/TRAINING PROGRAM

## 2021-07-29 PROCEDURE — 97530 THERAPEUTIC ACTIVITIES: CPT | Mod: GP

## 2021-07-29 PROCEDURE — 97116 GAIT TRAINING THERAPY: CPT | Mod: GP

## 2021-07-29 RX ORDER — PANTOPRAZOLE SODIUM 40 MG/1
40 TABLET, DELAYED RELEASE ORAL
Qty: 30 TABLET | Refills: 0 | Status: ON HOLD | OUTPATIENT
Start: 2021-07-30 | End: 2023-01-11

## 2021-07-29 RX ADMIN — POTASSIUM CHLORIDE 20 MEQ: 1500 TABLET, EXTENDED RELEASE ORAL at 00:51

## 2021-07-29 RX ADMIN — METOPROLOL TARTRATE 25 MG: 25 TABLET, FILM COATED ORAL at 09:43

## 2021-07-29 RX ADMIN — PANTOPRAZOLE SODIUM 40 MG: 40 TABLET, DELAYED RELEASE ORAL at 06:54

## 2021-07-29 RX ADMIN — FUROSEMIDE 40 MG: 40 TABLET ORAL at 09:43

## 2021-07-29 ASSESSMENT — ACTIVITIES OF DAILY LIVING (ADL)
ADLS_ACUITY_SCORE: 13

## 2021-07-29 NOTE — PLAN OF CARE
Pt. Alert and oriented x4. Vital signs stable on RA. Up SBA. Tolerating low fiber diet. Lung sounds clear. Bowel sounds active, + flatus. BM yesterday, adequate urine output. Skin intact. Denies pain. Denies nausea. Tele SR. Pt discharging home with daughter, reviewed meds and instructions prior to discharge

## 2021-07-29 NOTE — PLAN OF CARE
Physical Therapy Discharge Summary    Reason for therapy discharge:    Discharged to home with home therapy.    Progress towards therapy goal(s). See goals on Care Plan in [facility] electronic health record for goal details.  Goals partially met.  Barriers to achieving goals:   discharge from facility.    Therapy recommendation(s):    Continued therapy is recommended.  Rationale/Recommendations:  home PT to optimize functional recovery and independence in home environment.

## 2021-07-29 NOTE — PLAN OF CARE
VSS. A/O. SBA. Denies N/V. Tolerating diet. +BM. Voiding fine. K+ replaced x3. Pending TCU placement. Tele SR

## 2021-07-29 NOTE — DISCHARGE SUMMARY
[facility]  Hospitalist Discharge Summary      Date of Admission:  7/22/2021  Date of Discharge:  7/29/2021  Discharging Provider: Nat Park MD      Discharge Diagnoses   1. Small bowel obstruction  2. Large ventral hernia  3. GERD  4. Lactic acidosis  5. Hyponatremia  6. Hypokalemia  7. Hypomagnesemia  8. Essential hypertension  9. Hyperlipidemia    Follow-ups Needed After Discharge   Follow-up Appointments     Follow Up and recommended labs and tests      Follow up with primary care provider within 1 week. Recommend basic   metabolic panel/potassium level to be drawn by home care nurse           Discharge Disposition   Discharged to home  Condition at discharge: Stable    Hospital Course   Deborah Higginbotham is a 89 year old female with hx of ventral hernia s/p prior repair and HTN who was admitted on 7/22/2021 for a small bowel obstruction. She has improved with medical management, and was going to discharge to TCU, but due to lack of available facilities with her insurance, she is discharging to her daughter's home with home RN/PT/OT    Small bowel obstruction, Resolved  Large ventral hernia  Presented with progressive abdominal distension, nausea, and vomiting. CT abd/pelvis on arrival showed large abdominal hernia without evidence of strangulation and multiple dilated loops of bowel. General Surgery was consulted on admission and recommended medical management. She was initiated on bowel rest with supportive cares including NG tube placement with improvement.   - Tolerating low fiber diet at the time of discharge    GERD  - She was initiated on pantoprazole 40 mg daily during this hospitalization    Hypokalemia, Resolved  Patient had intermittent hypokalemia during her hospitalization which was replaced per protocol. She will continue her home potassium supplement at discharge  - Home care RN to draw potassium at initial visit    Lactic acidosis, Resolved  Lactate 5.4 on  arrival. On admission, she was empirically initiated on IV pip-tazo out of concern for bowel ischemia vs bacterial translocation from the gut, but this was discontinued within 24 hours. Lactate returned to normal with IV fluids    Hyponatremia, Resolved  Hypomagnesemia, Resolved  Resolved with supportive cares. Electrolytes were replaced per protocol    Essential hypertension  * PTA: metoprolol 25 mg BID, Lasix 40 mg daily  * Blood pressure typically 150s systolic; 170s systolic on average while hospitalized  - Continues on PTA meds, anticipate that blood pressures will gradually return to baseline as she continues to recover from acute illness    Hyperlipidemia  - Resume PTA pravastatin at discharge       Consultations This Hospital Stay   1. SURGERY GENERAL IP CONSULT  2. NUTRITION SERVICES ADULT IP CONSULT  3. PHYSICAL THERAPY ADULT IP CONSULT  4. OCCUPATIONAL THERAPY ADULT IP CONSULT  5. CARE MANAGEMENT / SOCIAL WORK IP CONSULT    Code Status   No CPR- Do NOT Intubate    Time Spent on this Encounter   I, Nat Park MD, personally saw the patient today and spent greater than 30 minutes discharging this patient.       Nat Park MD  [place] [address] [facility]  [address] [place] [address] [place]  [place] [address]  Phone: [phone]  ______________________________________________________________________    Physical Exam   Vital Signs: Temp: 98.1  F (36.7  C) Temp src: Oral BP: (!) 170/84 Pulse: 85   Resp: 16 SpO2: 95 % O2 Device: None (Room air)    Weight: 183 lbs 6.76 oz    Constitutional: Appears comfortable, NAD  HEENT: Sclera white, conjunctiva clear, EOMI, MMM  Respiratory: Breathing non-labored. Lungs CTAB - no wheezes/crackles/rhonchi  Cardiovascular: Heart RRR, no m/r/g. No pedal edema.   GI: +BS. Large ventral hernia. Abd soft/NT  Skin: Warm and dry. No rash.  Musculoskeletal: Normal muscle bulk and tone  Neurologic: Alert and appropriate. OLSEN  Psychiatric: Calm and  cooperative    Primary Care Physician   Stu Stanford    Discharge Orders      Home care nursing referral      Home Care PT Referral for Hospital Discharge      Home Care OT Referral for Hospital Discharge      Reason for your hospital stay    Small bowel obstruction which resolved without surgery     Follow Up and recommended labs and tests    Follow up with primary care provider within 1 week. Recommend basic metabolic panel/potassium level to be drawn by home care nurse     Activity    Your activity upon discharge: activity as tolerated     MD face to face encounter    Documentation of Face to Face and Certification for Home Health Services    I certify that patient: Deborah Higginbotham is under my care and that I, or a nurse practitioner or physician's assistant working with me, had a face-to-face encounter that meets the physician face-to-face encounter requirements with this patient on: 7/29/2021.    This encounter with the patient was in whole, or in part, for the following medical condition, which is the primary reason for home health care: Small bowel obstruction, hypokalemia.    I certify that, based on my findings, the following services are medically necessary home health services: Nursing, Occupational Therapy, and Physical Therapy.    My clinical findings support the need for the above services because: Nurse is needed: To assess nutrition and bowel status after changes in medications or other medical regimen.., Occupational Therapy Services are needed to assess and treat cognitive ability and address ADL safety due to impairment in mobility., and Physical Therapy Services are needed to assess and treat the following functional impairments: generalized weakness, physical deconditioning.    Further, I certify that my clinical findings support that this patient is homebound (i.e. absences from home require considerable and taxing effort and are for medical reasons or Denominational services or infrequently  or of short duration when for other reasons) because: Requires assistance of another person or specialized equipment to access medical services because patient: Requires supervision of another for safe transfer...    Based on the above findings. I certify that this patient is confined to the home and needs intermittent skilled nursing care, physical therapy and/or speech therapy.  The patient is under my care, and I have initiated the establishment of the plan of care.  This patient will be followed by a physician who will periodically review the plan of care.  Physician/Provider to provide follow up care: Stu Stanford    Attending hospital physician (the Medicare certified [place] provider): Nat Park MD  Physician Signature: See electronic signature associated with these discharge orders.  Date: 7/29/2021     Diet    Follow this diet upon discharge: Regular diet       Significant Results and Procedures   Most Recent 3 CBC's:Recent Labs   Lab Test 07/28/21  0646 07/23/21  0647 07/22/21  0738   WBC 9.7 5.2 7.1   HGB 13.2 13.9 16.7*   MCV 90 91 87    210 302     Most Recent 3 BMP's:Recent Labs   Lab Test 07/29/21  0607 07/28/21  2128 07/28/21  1527 07/28/21  0646 07/25/21  0651 07/23/21  0647   NA  --   --   --  137 140 140   POTASSIUM 4.4 3.0* 3.1* 3.2* 4.0 2.9*  2.9*   CHLORIDE  --   --   --  107 113* 105   CO2  --   --   --  22 25 31   BUN  --   --   --  14 13 17   CR  --   --   --  0.83 0.79 0.93   ANIONGAP  --   --   --  8 2* 4   ARABELLA  --   --   --  8.3* 8.5 8.4*   GLC  --   --   --  116* 104* 137*   ,   Results for orders placed or performed during the hospital encounter of 07/22/21   CT Abdomen Pelvis w Contrast    Narrative    CT ABDOMEN AND PELVIS WITH CONTRAST 7/22/2021 8:50 AM    CLINICAL HISTORY: Abdominal pain and vomiting with history of bowel  obstruction. Known abdominal wall hernia.    TECHNIQUE: CT scan of the abdomen and pelvis was performed following  injection of IV contrast.  Multiplanar reformats were obtained. Dose  reduction techniques were used.  CONTRAST: 98 mL Isovue-370    COMPARISON: CT abdomen and pelvis 7/13/2017.    FINDINGS:   LOWER CHEST: Lung bases are clear. Extensive coronary artery  calcification is noted.    HEPATOBILIARY: A few calcified gallstones are noted in the  gallbladder. Liver is unremarkable.    PANCREAS: Normal.    SPLEEN: Normal.    ADRENAL GLANDS: Indeterminate left adrenal nodule measures 1.4 cm on  series 4, image 75 which is unchanged. Right adrenal gland is  unremarkable.    KIDNEYS/BLADDER: Numerous tiny low-attenuation renal cortical lesions  are present bilaterally and appear stable, probably cysts but too  small to characterize by imaging. No urinary tract calculi or  hydronephrosis.    BOWEL: Stomach, duodenum and proximal small bowel are distended with  fluid with scattered air-fluid levels consistent with obstruction.  Large ventral hernia contains numerous loops of small bowel where  there is a focal transition point on series 4, image 242 indicating a  small bowel obstruction related to the hernia. Distal small bowel and  colon are decompressed. Appendix is normal.    LYMPH NODES: No enlarged abdominal or pelvic lymph nodes.    VASCULATURE: Chest find plaque abdominal aorta and branch vessels  without evidence of aneurysm or dissection.    PELVIC ORGANS: Uterus, ovaries and rectum are unremarkable. No free  pelvic fluid or pelvic mass.    ADDITIONAL FINDINGS: None.    MUSCULOSKELETAL: Degenerative spine changes. Bones appear osteopenic.      Impression    IMPRESSION:   1.  Small bowel obstruction due to a large anterior abdominal wall  hernia. Marked fluid distention of the stomach and duodenum also  noted. Suggest surgical consultation.    2.  Cholelithiasis and probable tiny scattered bilateral renal  cortical cysts appear stable. No specific follow-up suggested. No  other acute changes in the abdomen or pelvis to account for  patient's  symptoms.    JACEK AMAYA MD         SYSTEM ID:  [id number]   XR Abdomen 1 View    Narrative    XR ABDOMEN ONE VIEW   7/23/2021 7:06 PM     HISTORY: Gastrografin challenge for small-bowel obstruction.      COMPARISON: CT abdomen and pelvis 7/22/2021.      Impression    IMPRESSION: Multiple dilated small bowel loops again identified. This  is compatible with ongoing prominent small-bowel obstruction. Contrast  material within bowel loops identified. However, the contrast only  appears to localize to small bowel and there is no visible contrast  identified within the colon on the submitted images. No visible free  air.    TETE VO MD         SYSTEM ID:  [id number]   XR Abdomen 1 View    Narrative    ABDOMEN ONE VIEW  7/24/2021 10:31 AM     HISTORY: Small bowel obstruction.    COMPARISON: 7/23/2021      Impression    IMPRESSION: Persistent small bowel distention measuring up to 4.9 cm  compatible with partial small bowel obstruction. There is contrast  present in nondilated colon. No gross free air. The tip of nasogastric  tube appears to be at the gastroesophageal junction while the proximal  sidehole is projected over the distal esophagus.    MIS MITCHELL MD         SYSTEM ID:  [id number]       Discharge Medications   Current Discharge Medication List      START taking these medications    Details   pantoprazole (PROTONIX) 40 MG EC tablet Take 1 tablet (40 mg) by mouth every morning (before breakfast)  Qty: 30 tablet, Refills: 0    Associated Diagnoses: SBO (small bowel obstruction) (H)         CONTINUE these medications which have NOT CHANGED    Details   furosemide (LASIX) 20 MG tablet Take 40 mg by mouth daily      metoprolol tartrate (LOPRESSOR) 25 MG tablet Take 25 mg by mouth 2 times daily      potassium chloride (MICRO-K) 10 MEQ CR capsule Take 10 mEq by mouth 2 times daily Morning & Noon.      PRAVASTATIN SODIUM PO Take 40 mg by mouth At Bedtime       VITAMIN D, CHOLECALCIFEROL, PO Take 2,000 Units by  mouth daily            Allergies   No Known Allergies

## 2021-07-29 NOTE — PROGRESS NOTES
Occupational Therapy Discharge Summary    Reason for therapy discharge:    Discharged to home with home therapy.    Progress towards therapy goal(s). See goals on Care Plan in [facility] electronic health record for goal details.  Goals partially met.  Barriers to achieving goals:   discharge from facility.    Therapy recommendation(s):    Continued therapy is recommended.  Rationale/Recommendations:  Increase ADL independence and safety.

## 2021-07-29 NOTE — PROGRESS NOTES
Care Management Follow Up    Length of Stay (days): 7    Expected Discharge Date: 07/29/2021     Concerns to be Addressed:       Patient plan of care discussed at interdisciplinary rounds: Yes    Anticipated Discharge Disposition:  Home with homecare      Anticipated Discharge Services:    Anticipated Discharge DME:      Patient/family educated on Medicare website which has current facility and service quality ratings:    Education Provided on the Discharge Plan:    Patient/Family in Agreement with the Plan:      Referrals Placed by CM/SW:    Private pay costs discussed: Not applicable    Additional Information:  Carlotta was updated by rehab team that pt is now recommended for home with home care. CARLOTTA called pt's daughter, Briana to discuss this options. Briana reports she is going to be off of work until 8/16 because she is starting a new job. Briana reports that she could house pt in her home for initial recovery. Briana's address is [address] [place] [address]. Briana requested SW speak with pt regarding plan. Call from [facility] accepting pt at [facility]. SW declined the bed due to new plan. CARLOTTA and RNCC to go speak with pt regarding home care.       JESSIE Cardona

## 2021-07-30 LAB
ATRIAL RATE - MUSE: 107 BPM
DIASTOLIC BLOOD PRESSURE - MUSE: NORMAL MMHG
INTERPRETATION ECG - MUSE: NORMAL
P AXIS - MUSE: 31 DEGREES
PR INTERVAL - MUSE: 154 MS
QRS DURATION - MUSE: 94 MS
QT - MUSE: 404 MS
QTC - MUSE: 539 MS
R AXIS - MUSE: -74 DEGREES
SYSTOLIC BLOOD PRESSURE - MUSE: NORMAL MMHG
T AXIS - MUSE: 30 DEGREES
VENTRICULAR RATE- MUSE: 107 BPM

## 2021-08-03 ENCOUNTER — LAB REQUISITION (OUTPATIENT)
Dept: LAB | Facility: CLINIC | Age: 86
End: 2021-08-03
Payer: COMMERCIAL

## 2021-08-03 ENCOUNTER — APPOINTMENT (OUTPATIENT)
Dept: LAB | Facility: CLINIC | Age: 86
End: 2021-08-03
Attending: FAMILY MEDICINE
Payer: COMMERCIAL

## 2021-08-03 ENCOUNTER — MEDICAL CORRESPONDENCE (OUTPATIENT)
Dept: HEALTH INFORMATION MANAGEMENT | Facility: CLINIC | Age: 86
End: 2021-08-03

## 2021-08-03 DIAGNOSIS — E87.6 HYPOKALEMIA: ICD-10-CM

## 2021-08-03 LAB
ANION GAP SERPL CALCULATED.3IONS-SCNC: 5 MMOL/L (ref 3–14)
BUN SERPL-MCNC: 14 MG/DL (ref 7–30)
CALCIUM SERPL-MCNC: 8.7 MG/DL (ref 8.5–10.1)
CHLORIDE BLD-SCNC: 106 MMOL/L (ref 94–109)
CO2 SERPL-SCNC: 25 MMOL/L (ref 20–32)
CREAT SERPL-MCNC: 0.72 MG/DL (ref 0.52–1.04)
GFR SERPL CREATININE-BSD FRML MDRD: 74 ML/MIN/1.73M2
GLUCOSE BLD-MCNC: 101 MG/DL (ref 70–99)
POTASSIUM BLD-SCNC: 3.9 MMOL/L (ref 3.4–5.3)
SODIUM SERPL-SCNC: 136 MMOL/L (ref 133–144)

## 2021-08-03 PROCEDURE — 80048 BASIC METABOLIC PNL TOTAL CA: CPT | Mod: ORL | Performed by: FAMILY MEDICINE

## 2023-01-07 ENCOUNTER — HOSPITAL ENCOUNTER (INPATIENT)
Facility: CLINIC | Age: 88
LOS: 5 days | Discharge: NURSING, REHAB OR LONG-TERM CARE | DRG: 602 | End: 2023-01-12
Attending: EMERGENCY MEDICINE | Admitting: INTERNAL MEDICINE
Payer: COMMERCIAL

## 2023-01-07 ENCOUNTER — APPOINTMENT (OUTPATIENT)
Dept: ULTRASOUND IMAGING | Facility: CLINIC | Age: 88
DRG: 602 | End: 2023-01-07
Attending: EMERGENCY MEDICINE
Payer: COMMERCIAL

## 2023-01-07 ENCOUNTER — APPOINTMENT (OUTPATIENT)
Dept: GENERAL RADIOLOGY | Facility: CLINIC | Age: 88
DRG: 602 | End: 2023-01-07
Attending: EMERGENCY MEDICINE
Payer: COMMERCIAL

## 2023-01-07 DIAGNOSIS — J40 BRONCHITIS: ICD-10-CM

## 2023-01-07 DIAGNOSIS — L03.115 CELLULITIS OF RIGHT LOWER EXTREMITY: ICD-10-CM

## 2023-01-07 DIAGNOSIS — I16.0 HYPERTENSIVE URGENCY: Primary | ICD-10-CM

## 2023-01-07 DIAGNOSIS — M79.89 LEG SWELLING: ICD-10-CM

## 2023-01-07 DIAGNOSIS — R03.0 ELEVATED BLOOD PRESSURE READING: ICD-10-CM

## 2023-01-07 LAB
ALBUMIN SERPL-MCNC: 4 G/DL (ref 3.4–5)
ALP SERPL-CCNC: 88 U/L (ref 40–150)
ALT SERPL W P-5'-P-CCNC: 15 U/L (ref 0–50)
ANION GAP SERPL CALCULATED.3IONS-SCNC: 12 MMOL/L (ref 3–14)
ANION GAP SERPL CALCULATED.3IONS-SCNC: 9 MMOL/L (ref 3–14)
AST SERPL W P-5'-P-CCNC: 14 U/L (ref 0–45)
ATRIAL RATE - MUSE: 63 BPM
BASOPHILS # BLD AUTO: 0 10E3/UL (ref 0–0.2)
BASOPHILS NFR BLD AUTO: 1 %
BILIRUB SERPL-MCNC: 0.5 MG/DL (ref 0.2–1.3)
BUN SERPL-MCNC: 15 MG/DL (ref 7–30)
BUN SERPL-MCNC: 16 MG/DL (ref 7–30)
CALCIUM SERPL-MCNC: 9.2 MG/DL (ref 8.5–10.1)
CALCIUM SERPL-MCNC: 9.8 MG/DL (ref 8.5–10.1)
CHLORIDE BLD-SCNC: 104 MMOL/L (ref 94–109)
CHLORIDE BLD-SCNC: 105 MMOL/L (ref 94–109)
CO2 SERPL-SCNC: 23 MMOL/L (ref 20–32)
CO2 SERPL-SCNC: 28 MMOL/L (ref 20–32)
CREAT SERPL-MCNC: 0.81 MG/DL (ref 0.52–1.04)
CREAT SERPL-MCNC: 0.87 MG/DL (ref 0.52–1.04)
CREAT SERPL-MCNC: 0.92 MG/DL (ref 0.52–1.04)
DIASTOLIC BLOOD PRESSURE - MUSE: NORMAL MMHG
EOSINOPHIL # BLD AUTO: 0.2 10E3/UL (ref 0–0.7)
EOSINOPHIL NFR BLD AUTO: 2 %
ERYTHROCYTE [DISTWIDTH] IN BLOOD BY AUTOMATED COUNT: 12.9 % (ref 10–15)
ERYTHROCYTE [DISTWIDTH] IN BLOOD BY AUTOMATED COUNT: 13 % (ref 10–15)
FLUAV RNA SPEC QL NAA+PROBE: NEGATIVE
FLUBV RNA RESP QL NAA+PROBE: NEGATIVE
GFR SERPL CREATININE-BSD FRML MDRD: 58 ML/MIN/1.73M2
GFR SERPL CREATININE-BSD FRML MDRD: 63 ML/MIN/1.73M2
GFR SERPL CREATININE-BSD FRML MDRD: 68 ML/MIN/1.73M2
GLUCOSE BLD-MCNC: 108 MG/DL (ref 70–99)
GLUCOSE BLD-MCNC: 116 MG/DL (ref 70–99)
HCT VFR BLD AUTO: 38.5 % (ref 35–47)
HCT VFR BLD AUTO: 43.4 % (ref 35–47)
HGB BLD-MCNC: 13.3 G/DL (ref 11.7–15.7)
HGB BLD-MCNC: 14 G/DL (ref 11.7–15.7)
HOLD SPECIMEN: NORMAL
IMM GRANULOCYTES # BLD: 0.1 10E3/UL
IMM GRANULOCYTES NFR BLD: 1 %
INTERPRETATION ECG - MUSE: NORMAL
LACTATE SERPL-SCNC: 1.7 MMOL/L (ref 0.7–2)
LYMPHOCYTES # BLD AUTO: 1.3 10E3/UL (ref 0.8–5.3)
LYMPHOCYTES NFR BLD AUTO: 21 %
MAGNESIUM SERPL-MCNC: 2.4 MG/DL (ref 1.6–2.3)
MCH RBC QN AUTO: 30 PG (ref 26.5–33)
MCH RBC QN AUTO: 30.9 PG (ref 26.5–33)
MCHC RBC AUTO-ENTMCNC: 32.3 G/DL (ref 31.5–36.5)
MCHC RBC AUTO-ENTMCNC: 34.5 G/DL (ref 31.5–36.5)
MCV RBC AUTO: 89 FL (ref 78–100)
MCV RBC AUTO: 93 FL (ref 78–100)
MONOCYTES # BLD AUTO: 0.7 10E3/UL (ref 0–1.3)
MONOCYTES NFR BLD AUTO: 11 %
NEUTROPHILS # BLD AUTO: 4.2 10E3/UL (ref 1.6–8.3)
NEUTROPHILS NFR BLD AUTO: 64 %
NRBC # BLD AUTO: 0 10E3/UL
NRBC BLD AUTO-RTO: 0 /100
NT-PROBNP SERPL-MCNC: 1013 PG/ML (ref 0–1800)
P AXIS - MUSE: 44 DEGREES
PHOSPHATE SERPL-MCNC: 3.7 MG/DL (ref 2.5–4.5)
PLATELET # BLD AUTO: 256 10E3/UL (ref 150–450)
PLATELET # BLD AUTO: 256 10E3/UL (ref 150–450)
POTASSIUM BLD-SCNC: 3.6 MMOL/L (ref 3.4–5.3)
POTASSIUM BLD-SCNC: 3.7 MMOL/L (ref 3.4–5.3)
PR INTERVAL - MUSE: 174 MS
PROT SERPL-MCNC: 7.9 G/DL (ref 6.8–8.8)
QRS DURATION - MUSE: 88 MS
QT - MUSE: 442 MS
QTC - MUSE: 452 MS
R AXIS - MUSE: -43 DEGREES
RBC # BLD AUTO: 4.31 10E6/UL (ref 3.8–5.2)
RBC # BLD AUTO: 4.67 10E6/UL (ref 3.8–5.2)
RSV RNA SPEC NAA+PROBE: NEGATIVE
SARS-COV-2 RNA RESP QL NAA+PROBE: NEGATIVE
SODIUM SERPL-SCNC: 139 MMOL/L (ref 133–144)
SODIUM SERPL-SCNC: 142 MMOL/L (ref 133–144)
SYSTOLIC BLOOD PRESSURE - MUSE: NORMAL MMHG
T AXIS - MUSE: 24 DEGREES
TROPONIN I SERPL HS-MCNC: 11 NG/L
TROPONIN I SERPL HS-MCNC: 20 NG/L
TROPONIN I SERPL HS-MCNC: 38 NG/L
VENTRICULAR RATE- MUSE: 63 BPM
WBC # BLD AUTO: 6.5 10E3/UL (ref 4–11)
WBC # BLD AUTO: 8.8 10E3/UL (ref 4–11)

## 2023-01-07 PROCEDURE — 36415 COLL VENOUS BLD VENIPUNCTURE: CPT | Performed by: EMERGENCY MEDICINE

## 2023-01-07 PROCEDURE — 250N000009 HC RX 250: Performed by: INTERNAL MEDICINE

## 2023-01-07 PROCEDURE — 93010 ELECTROCARDIOGRAM REPORT: CPT | Performed by: INTERNAL MEDICINE

## 2023-01-07 PROCEDURE — 83605 ASSAY OF LACTIC ACID: CPT | Performed by: INTERNAL MEDICINE

## 2023-01-07 PROCEDURE — 999N000157 HC STATISTIC RCP TIME EA 10 MIN

## 2023-01-07 PROCEDURE — 87637 SARSCOV2&INF A&B&RSV AMP PRB: CPT | Performed by: EMERGENCY MEDICINE

## 2023-01-07 PROCEDURE — 96365 THER/PROPH/DIAG IV INF INIT: CPT

## 2023-01-07 PROCEDURE — 250N000011 HC RX IP 250 OP 636: Performed by: EMERGENCY MEDICINE

## 2023-01-07 PROCEDURE — 83735 ASSAY OF MAGNESIUM: CPT | Performed by: INTERNAL MEDICINE

## 2023-01-07 PROCEDURE — 87077 CULTURE AEROBIC IDENTIFY: CPT | Performed by: EMERGENCY MEDICINE

## 2023-01-07 PROCEDURE — 93970 EXTREMITY STUDY: CPT

## 2023-01-07 PROCEDURE — 94640 AIRWAY INHALATION TREATMENT: CPT | Mod: 76

## 2023-01-07 PROCEDURE — 80053 COMPREHEN METABOLIC PANEL: CPT | Performed by: EMERGENCY MEDICINE

## 2023-01-07 PROCEDURE — 84484 ASSAY OF TROPONIN QUANT: CPT | Performed by: NURSE PRACTITIONER

## 2023-01-07 PROCEDURE — 93005 ELECTROCARDIOGRAM TRACING: CPT

## 2023-01-07 PROCEDURE — 84100 ASSAY OF PHOSPHORUS: CPT | Performed by: NURSE PRACTITIONER

## 2023-01-07 PROCEDURE — 258N000003 HC RX IP 258 OP 636: Performed by: EMERGENCY MEDICINE

## 2023-01-07 PROCEDURE — 99285 EMERGENCY DEPT VISIT HI MDM: CPT | Mod: 25

## 2023-01-07 PROCEDURE — 83880 ASSAY OF NATRIURETIC PEPTIDE: CPT | Performed by: EMERGENCY MEDICINE

## 2023-01-07 PROCEDURE — 82565 ASSAY OF CREATININE: CPT | Performed by: NURSE PRACTITIONER

## 2023-01-07 PROCEDURE — 85027 COMPLETE CBC AUTOMATED: CPT | Performed by: NURSE PRACTITIONER

## 2023-01-07 PROCEDURE — 99223 1ST HOSP IP/OBS HIGH 75: CPT | Mod: AI | Performed by: INTERNAL MEDICINE

## 2023-01-07 PROCEDURE — 85025 COMPLETE CBC W/AUTO DIFF WBC: CPT | Performed by: EMERGENCY MEDICINE

## 2023-01-07 PROCEDURE — 84484 ASSAY OF TROPONIN QUANT: CPT | Performed by: EMERGENCY MEDICINE

## 2023-01-07 PROCEDURE — 36415 COLL VENOUS BLD VENIPUNCTURE: CPT | Performed by: INTERNAL MEDICINE

## 2023-01-07 PROCEDURE — 87149 DNA/RNA DIRECT PROBE: CPT | Performed by: EMERGENCY MEDICINE

## 2023-01-07 PROCEDURE — 120N000001 HC R&B MED SURG/OB

## 2023-01-07 PROCEDURE — 71046 X-RAY EXAM CHEST 2 VIEWS: CPT

## 2023-01-07 PROCEDURE — 94640 AIRWAY INHALATION TREATMENT: CPT

## 2023-01-07 PROCEDURE — 250N000011 HC RX IP 250 OP 636: Performed by: INTERNAL MEDICINE

## 2023-01-07 PROCEDURE — C9803 HOPD COVID-19 SPEC COLLECT: HCPCS

## 2023-01-07 PROCEDURE — 84484 ASSAY OF TROPONIN QUANT: CPT | Performed by: INTERNAL MEDICINE

## 2023-01-07 PROCEDURE — 250N000013 HC RX MED GY IP 250 OP 250 PS 637: Performed by: INTERNAL MEDICINE

## 2023-01-07 RX ORDER — OXYCODONE HYDROCHLORIDE 5 MG/1
5 TABLET ORAL EVERY 4 HOURS PRN
Status: DISCONTINUED | OUTPATIENT
Start: 2023-01-07 | End: 2023-01-12 | Stop reason: HOSPADM

## 2023-01-07 RX ORDER — NALOXONE HYDROCHLORIDE 0.4 MG/ML
0.4 INJECTION, SOLUTION INTRAMUSCULAR; INTRAVENOUS; SUBCUTANEOUS
Status: DISCONTINUED | OUTPATIENT
Start: 2023-01-07 | End: 2023-01-12 | Stop reason: HOSPADM

## 2023-01-07 RX ORDER — ENOXAPARIN SODIUM 100 MG/ML
30 INJECTION SUBCUTANEOUS EVERY 24 HOURS
Status: DISCONTINUED | OUTPATIENT
Start: 2023-01-07 | End: 2023-01-08

## 2023-01-07 RX ORDER — LABETALOL HYDROCHLORIDE 5 MG/ML
10 INJECTION, SOLUTION INTRAVENOUS
Status: DISCONTINUED | OUTPATIENT
Start: 2023-01-07 | End: 2023-01-12 | Stop reason: HOSPADM

## 2023-01-07 RX ORDER — PROCHLORPERAZINE 25 MG
12.5 SUPPOSITORY, RECTAL RECTAL EVERY 12 HOURS PRN
Status: DISCONTINUED | OUTPATIENT
Start: 2023-01-07 | End: 2023-01-12 | Stop reason: HOSPADM

## 2023-01-07 RX ORDER — ONDANSETRON 4 MG/1
4 TABLET, ORALLY DISINTEGRATING ORAL EVERY 6 HOURS PRN
Status: DISCONTINUED | OUTPATIENT
Start: 2023-01-07 | End: 2023-01-12 | Stop reason: HOSPADM

## 2023-01-07 RX ORDER — LEVALBUTEROL 1.25 MG/.5ML
1.25 SOLUTION, CONCENTRATE RESPIRATORY (INHALATION)
Status: DISCONTINUED | OUTPATIENT
Start: 2023-01-07 | End: 2023-01-09 | Stop reason: ALTCHOICE

## 2023-01-07 RX ORDER — POLYETHYLENE GLYCOL 3350 17 G/17G
17 POWDER, FOR SOLUTION ORAL DAILY PRN
Status: DISCONTINUED | OUTPATIENT
Start: 2023-01-07 | End: 2023-01-12 | Stop reason: HOSPADM

## 2023-01-07 RX ORDER — PANTOPRAZOLE SODIUM 40 MG/1
40 TABLET, DELAYED RELEASE ORAL
Status: DISCONTINUED | OUTPATIENT
Start: 2023-01-08 | End: 2023-01-12 | Stop reason: HOSPADM

## 2023-01-07 RX ORDER — ACETAMINOPHEN 650 MG/1
650 SUPPOSITORY RECTAL EVERY 6 HOURS PRN
Status: DISCONTINUED | OUTPATIENT
Start: 2023-01-07 | End: 2023-01-12 | Stop reason: HOSPADM

## 2023-01-07 RX ORDER — AMLODIPINE BESYLATE 5 MG/1
5 TABLET ORAL DAILY
Status: DISCONTINUED | OUTPATIENT
Start: 2023-01-07 | End: 2023-01-12 | Stop reason: HOSPADM

## 2023-01-07 RX ORDER — CEFAZOLIN SODIUM 2 G/100ML
2 INJECTION, SOLUTION INTRAVENOUS EVERY 8 HOURS
Status: DISCONTINUED | OUTPATIENT
Start: 2023-01-07 | End: 2023-01-12 | Stop reason: HOSPADM

## 2023-01-07 RX ORDER — AMOXICILLIN 250 MG
2 CAPSULE ORAL 2 TIMES DAILY PRN
Status: DISCONTINUED | OUTPATIENT
Start: 2023-01-07 | End: 2023-01-12 | Stop reason: HOSPADM

## 2023-01-07 RX ORDER — GUAIFENESIN/DEXTROMETHORPHAN 100-10MG/5
10 SYRUP ORAL EVERY 4 HOURS PRN
Status: DISCONTINUED | OUTPATIENT
Start: 2023-01-07 | End: 2023-01-08

## 2023-01-07 RX ORDER — MELOXICAM 15 MG/1
15 TABLET ORAL DAILY
COMMUNITY

## 2023-01-07 RX ORDER — HYDRALAZINE HYDROCHLORIDE 20 MG/ML
10 INJECTION INTRAMUSCULAR; INTRAVENOUS EVERY 4 HOURS PRN
Status: DISCONTINUED | OUTPATIENT
Start: 2023-01-07 | End: 2023-01-12 | Stop reason: HOSPADM

## 2023-01-07 RX ORDER — METOPROLOL TARTRATE 25 MG/1
25 TABLET, FILM COATED ORAL 2 TIMES DAILY
Status: DISCONTINUED | OUTPATIENT
Start: 2023-01-07 | End: 2023-01-08

## 2023-01-07 RX ORDER — PRAVASTATIN SODIUM 20 MG
40 TABLET ORAL AT BEDTIME
Status: DISCONTINUED | OUTPATIENT
Start: 2023-01-07 | End: 2023-01-12 | Stop reason: HOSPADM

## 2023-01-07 RX ORDER — CEFTRIAXONE 2 G/1
2 INJECTION, POWDER, FOR SOLUTION INTRAMUSCULAR; INTRAVENOUS ONCE
Status: COMPLETED | OUTPATIENT
Start: 2023-01-07 | End: 2023-01-07

## 2023-01-07 RX ORDER — CHOLECALCIFEROL (VITAMIN D3) 50 MCG
2000 TABLET ORAL DAILY
Status: DISCONTINUED | OUTPATIENT
Start: 2023-01-08 | End: 2023-01-12 | Stop reason: HOSPADM

## 2023-01-07 RX ORDER — IPRATROPIUM BROMIDE AND ALBUTEROL SULFATE 2.5; .5 MG/3ML; MG/3ML
3 SOLUTION RESPIRATORY (INHALATION)
Status: DISCONTINUED | OUTPATIENT
Start: 2023-01-07 | End: 2023-01-07

## 2023-01-07 RX ORDER — PROCHLORPERAZINE MALEATE 5 MG
5 TABLET ORAL EVERY 6 HOURS PRN
Status: DISCONTINUED | OUTPATIENT
Start: 2023-01-07 | End: 2023-01-12 | Stop reason: HOSPADM

## 2023-01-07 RX ORDER — NALOXONE HYDROCHLORIDE 0.4 MG/ML
0.2 INJECTION, SOLUTION INTRAMUSCULAR; INTRAVENOUS; SUBCUTANEOUS
Status: DISCONTINUED | OUTPATIENT
Start: 2023-01-07 | End: 2023-01-12 | Stop reason: HOSPADM

## 2023-01-07 RX ORDER — ONDANSETRON 2 MG/ML
4 INJECTION INTRAMUSCULAR; INTRAVENOUS EVERY 6 HOURS PRN
Status: DISCONTINUED | OUTPATIENT
Start: 2023-01-07 | End: 2023-01-12 | Stop reason: HOSPADM

## 2023-01-07 RX ORDER — MAGNESIUM OXIDE 400 MG/1
400 TABLET ORAL DAILY
Status: ON HOLD | COMMUNITY
End: 2023-01-11

## 2023-01-07 RX ORDER — LIDOCAINE 40 MG/G
CREAM TOPICAL
Status: DISCONTINUED | OUTPATIENT
Start: 2023-01-07 | End: 2023-01-12 | Stop reason: HOSPADM

## 2023-01-07 RX ORDER — LORATADINE 10 MG/1
10 TABLET ORAL DAILY
Status: DISCONTINUED | OUTPATIENT
Start: 2023-01-07 | End: 2023-01-12 | Stop reason: HOSPADM

## 2023-01-07 RX ORDER — POTASSIUM CHLORIDE 750 MG/1
10 TABLET, EXTENDED RELEASE ORAL 2 TIMES DAILY
Status: DISCONTINUED | OUTPATIENT
Start: 2023-01-07 | End: 2023-01-12 | Stop reason: HOSPADM

## 2023-01-07 RX ORDER — LISINOPRIL 10 MG/1
10 TABLET ORAL DAILY
Status: DISCONTINUED | OUTPATIENT
Start: 2023-01-07 | End: 2023-01-08

## 2023-01-07 RX ORDER — HYDRALAZINE HYDROCHLORIDE 20 MG/ML
5 INJECTION INTRAMUSCULAR; INTRAVENOUS ONCE
Status: COMPLETED | OUTPATIENT
Start: 2023-01-07 | End: 2023-01-07

## 2023-01-07 RX ORDER — FUROSEMIDE 10 MG/ML
20 INJECTION INTRAMUSCULAR; INTRAVENOUS
Status: DISCONTINUED | OUTPATIENT
Start: 2023-01-07 | End: 2023-01-08

## 2023-01-07 RX ORDER — AZITHROMYCIN 250 MG/1
500 TABLET, FILM COATED ORAL DAILY
Status: DISCONTINUED | OUTPATIENT
Start: 2023-01-07 | End: 2023-01-08

## 2023-01-07 RX ORDER — ACETAMINOPHEN 325 MG/1
650 TABLET ORAL EVERY 6 HOURS PRN
Status: DISCONTINUED | OUTPATIENT
Start: 2023-01-07 | End: 2023-01-12 | Stop reason: HOSPADM

## 2023-01-07 RX ORDER — FLUTICASONE PROPIONATE 50 MCG
1 SPRAY, SUSPENSION (ML) NASAL DAILY
Status: DISCONTINUED | OUTPATIENT
Start: 2023-01-07 | End: 2023-01-12 | Stop reason: HOSPADM

## 2023-01-07 RX ORDER — CLONIDINE HYDROCHLORIDE 0.2 MG/1
0.2 TABLET ORAL 2 TIMES DAILY PRN
Status: ON HOLD | COMMUNITY
End: 2023-01-11

## 2023-01-07 RX ORDER — AMOXICILLIN 250 MG
1 CAPSULE ORAL 2 TIMES DAILY PRN
Status: DISCONTINUED | OUTPATIENT
Start: 2023-01-07 | End: 2023-01-12 | Stop reason: HOSPADM

## 2023-01-07 RX ADMIN — FLUTICASONE PROPIONATE 1 SPRAY: 50 SPRAY, METERED NASAL at 21:10

## 2023-01-07 RX ADMIN — VANCOMYCIN HYDROCHLORIDE 2000 MG: 10 INJECTION, POWDER, LYOPHILIZED, FOR SOLUTION INTRAVENOUS at 16:51

## 2023-01-07 RX ADMIN — LORATADINE 10 MG: 10 TABLET ORAL at 19:48

## 2023-01-07 RX ADMIN — IPRATROPIUM BROMIDE AND ALBUTEROL SULFATE 3 ML: .5; 3 SOLUTION RESPIRATORY (INHALATION) at 19:16

## 2023-01-07 RX ADMIN — ENOXAPARIN SODIUM 30 MG: 30 INJECTION SUBCUTANEOUS at 19:48

## 2023-01-07 RX ADMIN — LISINOPRIL 10 MG: 10 TABLET ORAL at 18:59

## 2023-01-07 RX ADMIN — LEVALBUTEROL HYDROCHLORIDE 1.25 MG: 1.25 SOLUTION, CONCENTRATE RESPIRATORY (INHALATION) at 23:39

## 2023-01-07 RX ADMIN — HYDRALAZINE HYDROCHLORIDE 10 MG: 20 INJECTION, SOLUTION INTRAMUSCULAR; INTRAVENOUS at 19:48

## 2023-01-07 RX ADMIN — PRAVASTATIN SODIUM 40 MG: 20 TABLET ORAL at 22:00

## 2023-01-07 RX ADMIN — ACETAMINOPHEN 650 MG: 325 TABLET, FILM COATED ORAL at 18:59

## 2023-01-07 RX ADMIN — AZITHROMYCIN MONOHYDRATE 500 MG: 250 TABLET ORAL at 18:59

## 2023-01-07 RX ADMIN — GUAIFENESIN AND DEXTROMETHORPHAN 10 ML: 100; 10 SYRUP ORAL at 23:13

## 2023-01-07 RX ADMIN — POTASSIUM CHLORIDE 10 MEQ: 750 TABLET, EXTENDED RELEASE ORAL at 22:00

## 2023-01-07 RX ADMIN — HYDRALAZINE HYDROCHLORIDE 5 MG: 20 INJECTION, SOLUTION INTRAMUSCULAR; INTRAVENOUS at 16:45

## 2023-01-07 RX ADMIN — CEFAZOLIN SODIUM 2 G: 2 INJECTION, SOLUTION INTRAVENOUS at 21:08

## 2023-01-07 RX ADMIN — FUROSEMIDE 20 MG: 10 INJECTION, SOLUTION INTRAMUSCULAR; INTRAVENOUS at 19:00

## 2023-01-07 RX ADMIN — CEFTRIAXONE SODIUM 2 G: 2 INJECTION, POWDER, FOR SOLUTION INTRAMUSCULAR; INTRAVENOUS at 15:57

## 2023-01-07 RX ADMIN — AMLODIPINE BESYLATE 5 MG: 5 TABLET ORAL at 18:59

## 2023-01-07 ASSESSMENT — ACTIVITIES OF DAILY LIVING (ADL)
ADLS_ACUITY_SCORE: 35

## 2023-01-07 NOTE — PHARMACY-ADMISSION MEDICATION HISTORY
Pharmacy Medication History  Admission medication history interview status for the 1/7/2023  admission is complete. See EPIC admission navigator for prior to admission medications     Location of Interview: Patient room  Medication history sources: Patient and prescription bottles    Significant changes made to the medication list:  Added metamucil, magnesium, clonidine, meloxicam  Marked pantoprazole as NOT TAKING    Additional medication history information:   none    Medication reconciliation completed by provider prior to medication history? Yes    Time spent in this activity: 20 minutes    Prior to Admission medications    Medication Sig Last Dose Taking? Auth Provider Long Term End Date   cloNIDine (CATAPRES) 0.2 MG tablet Take 0.2 mg by mouth 2 times daily as needed (for SBP>165) prn Yes Unknown, Entered By History No    furosemide (LASIX) 20 MG tablet Take 40 mg by mouth daily 1/7/2023 at am Yes Unknown, Entered By History Yes    magnesium oxide (MAG-OX) 400 MG tablet Take 400 mg by mouth daily 1/7/2023 at am Yes Unknown, Entered By History     meloxicam (MOBIC) 15 MG tablet Take 15 mg by mouth daily 1/6/2023 Yes Unknown, Entered By History Yes    metoprolol tartrate (LOPRESSOR) 25 MG tablet Take 25 mg by mouth 2 times daily 1/7/2023 at am Yes Unknown, Entered By History Yes    potassium chloride (MICRO-K) 10 MEQ CR capsule Take 10 mEq by mouth 2 times daily Morning & Noon. 1/7/2023 at am Yes Reported, Patient     PRAVASTATIN SODIUM PO Take 40 mg by mouth At Bedtime  1/6/2023 at pm Yes Reported, Patient Yes    psyllium (METAMUCIL/KONSYL) Packet Take 1 packet by mouth daily as needed for constipation prn Yes Unknown, Entered By History     VITAMIN D, CHOLECALCIFEROL, PO Take 2,000 Units by mouth daily  1/7/2023 at am Yes Unknown, Entered By History     pantoprazole (PROTONIX) 40 MG EC tablet Take 1 tablet (40 mg) by mouth every morning (before breakfast)  Patient not taking: Reported on 1/7/2023 Not Taking   Nat Park MD         The information provided in this note is only as accurate as the sources available at the time of update(s)

## 2023-01-07 NOTE — H&P
[facility]    History and Physical  Hospitalist       Date of Admission:  1/7/2023    Assessment & Plan      This is a 89-year-old female with history of breast cancer, hypertension, hyperlipidemia, GERD, came to the ED with multiple problems including cold and cough for the last couple of weeks, not getting better, nasal drainage, uncontrolled blood pressure and developing swelling and redness on the lower extremities.    ASSESSMENT AND PLAN:     1.  Right lower extremity cellulitis with venous stasis ulcer:  This is a 89-year-old female with history of uncontrolled hypertension and longstanding leg swelling.  Now presented with worsening lower extremity swelling. Most likely this is venous stasis and venous stasis ulcer causing her cellulitis on the right leg.  Right leg is erythematous, warm and slightly tender with weeping ulcers.  At this point, she received vancomycin and ceftriaxone in the ED.  I will hold it on that.  We will keep her on cefazolin 2 grams every 8 hours.  Elevate her leg. Ask OT to put the lymphedema wraps and elevate her legs.  Continue with IV cefazolin at this time.  2.  Bilateral lower extremity swelling:  The cause is uncertain at this time. Given her uncontrolled hypertension, need to rule out any congestive heart failure.  We are going to start her on IV Lasix 20 mg b.i.d. at this time.  3.  Hypertensive urgency:  The patient's blood pressure is uncontrolled at this time.  It is 226/118 at the most.  She is only on metoprolol.  I do not think that is enough to control her blood pressure.  She was given a dose of IV hydralazine in the ED.  I will put her on lisinopril 10 mg and amlodipine 5 mg and continue with the metoprolol 25 mg b.i.d.  May change metoprolol to Coreg if her blood pressure remains unstable.  She will be on IV Lasix 20 mg b.i.d. as well.  We will see how she does.  If her blood pressure remains uncontrolled, we can slowly titrate those  medications.  I will do echocardiogram to rule out any hypertensive cardiomyopathy.  4.  Acute bronchitis and postnasal drip:  The patient has had cold symptoms for the last couple of weeks. It is getting slightly better, but still coughing quite a bit, coughing up some phlegm.  Chest x-ray does not show any obvious pneumonia.  On examination, does have some wheezing.  I think most likely she has acute bronchitis.  I will put her on azithromycin p.o. 500 mg for 3 days, DuoNeb nebulization, Claritin nasal spray and Flonase nasal spray to see if her symptoms improve with that.  5.  Hyperlipidemia: On pravastatin.  We will continue with that.  6.  History of gastroesophageal reflux disease:  On Protonix.  We will continue with that.  7.  DVT prophylaxis with Lovenox.    CODE STATUS:  DNR/DNI as per the patient wishes.    The case was discussed with the ED physician, the patient's daughter who is the POA and the nursing staff taking care of the patient.    Berto Muir MD    DVT Prophylaxis: Enoxaparin (Lovenox) SQ  Code Status: DNR / DNI    Disposition: Expected discharge in 2 days once stable     Berto Muir MD, MD    Primary Care Physician   Stu Stanford    Chief Complaint   Cough, cold, swelling, high blood pressure and wound to LE     History is obtained from the patient    History of Present Illness   Admitted: 01/07/2023    HISTORY OF PRESENT ILLNESS:  This is a 89-year-old female with history of breast cancer, hypertension, hyperlipidemia, GERD, came to the ED with multiple problems including cold and cough for the last couple of weeks, not getting better, nasal drainage, uncontrolled blood pressure and developing swelling and redness on the lower extremities.    According to the patient, she started having cold symptoms right after [place].  Since then, she has been coughing, coughing up some phlegm.  She has drainage from her nose and postnasal drip as well. Most of the time, she swallows most of her  phlegm.  She is not very active recently because of that and may get occasionally fatigued and tired as well and stays mostly in one piece.  Also, noticed by her family is that her blood pressure has been uncontrolled.  She was taken to the urgent care on last Wednesday on 01/04/2023.  They gave her a dose of clonidine and sent her home.  She only takes metoprolol at home.  Blood pressure remains on the higher side.  She also noticed that over that period, she has developed swelling in her legs.  It has been getting worse.  She always has swelling, but it has been getting more worse recently.  She developed redness to the right leg and developed ulcers on her right lower extremity as well, which started weeping as well. Because of these concerns, the family brought her to the ED for evaluation.  The patient denies any fever, chills, chest pain, orthopnea, PND, palpitation, headache, dizziness, lightheadedness.  No abdominal pain, back pain, dysuria, hematuria, constipation, or diarrhea at this time.  The patient has been compliant with her medication.  She has been taking diuretics.  She has been taking metoprolol, but has uncontrolled blood pressure.  Rest of the review of system is negative at this time.    Past Medical History    I have reviewed this patient's medical history and updated it with pertinent information if needed.   Past Medical History:   Diagnosis Date     Cancer (H)     breast     High cholesterol      Hypertension        Past Surgical History   I have reviewed this patient's surgical history and updated it with pertinent information if needed.  Past Surgical History:   Procedure Laterality Date     BREAST SURGERY       HERNIA REPAIR       ORTHOPEDIC SURGERY         Prior to Admission Medications   Prior to Admission Medications   Prescriptions Last Dose Informant Patient Reported? Taking?   PRAVASTATIN SODIUM PO  Pharmacy Yes No   Sig: Take 40 mg by mouth At Bedtime    VITAMIN D,  CHOLECALCIFEROL, PO  Self Yes No   Sig: Take 2,000 Units by mouth daily    furosemide (LASIX) 20 MG tablet   Yes No   Sig: Take 40 mg by mouth daily   metoprolol tartrate (LOPRESSOR) 25 MG tablet   Yes No   Sig: Take 25 mg by mouth 2 times daily   pantoprazole (PROTONIX) 40 MG EC tablet   No No   Sig: Take 1 tablet (40 mg) by mouth every morning (before breakfast)   potassium chloride (MICRO-K) 10 MEQ CR capsule  Pharmacy Yes No   Sig: Take 10 mEq by mouth 2 times daily Morning & Noon.      Facility-Administered Medications: None     Allergies   No Known Allergies    Social History   I have reviewed this patient's social history and updated it with pertinent information if needed. Deborah Higginbotham  reports that she has quit smoking. She has never used smokeless tobacco. She reports that she does not use drugs.    Family History   I have reviewed this patient's family history and updated it with pertinent information if needed.   Family History   Problem Relation Age of Onset     Coronary Artery Disease Father      Bone Cancer Father      Breast Cancer Sister        Review of Systems   CONSTITUTIONAL:  positive for  fatigue and malaise  EYES:  negative  HEENT:  positive for  nasal congestion  RESPIRATORY:  positive for  cough with sputum and dyspnea  CARDIOVASCULAR:  negative  GASTROINTESTINAL:  negative  GENITOURINARY:  negative  INTEGUMENT/BREAST:  negative  HEMATOLOGIC/LYMPHATIC:  negative  ALLERGIC/IMMUNOLOGIC:  negative  ENDOCRINE:  negative  MUSCULOSKELETAL:  positive for  Swelling LE   NEUROLOGICAL:  negative  BEHAVIOR/PSYCH:  negative    Physical Exam   Temp: 98.3  F (36.8  C) Temp src: Temporal BP: (!) 236/112 Pulse: 76   Resp: 16 SpO2: 96 %      Vital Signs with Ranges  Temp:  [98.3  F (36.8  C)] 98.3  F (36.8  C)  Pulse:  [63-76] 76  Resp:  [16] 16  BP: (196-236)/() 236/112  SpO2:  [93 %-97 %] 96 %  190 lbs 0 oz    Constitutional: Awake, alert, cooperative, no apparent distress.  Eyes: Conjunctiva  and pupils examined and normal.  HEENT: Moist mucous membranes nasal congestion, PND, normal dentition.  Respiratory: good air entry bilaterally, no crackles but occasional wheezing.  Cardiovascular: Regular rate and rhythm, normal S1 and S2, and no murmur noted.  GI: Soft, non-distended, non-tender, normal bowel sounds.  Lymph/Hematologic: No anterior cervical or supraclavicular adenopathy.  Skin: right leg has erythema from mild calf to the foot, has 3 superficial ulcer on right LE, mild tenderness and warmth positive, Left leg has 1-2+ LE edema   Musculoskeletal: No joint swelling, erythema or tenderness.  Neurologic: Cranial nerves 2-12 intact, normal strength and sensation.  Psychiatric: Alert, oriented to person, place and time, no obvious anxiety or depression.    Data   Data reviewed today:  I personally reviewed xray chest and US LE and agree with the report below .  Recent Labs   Lab 01/07/23  1241   WBC 6.5   HGB 14.0   MCV 93         POTASSIUM 3.7   CHLORIDE 105   CO2 28   BUN 15   CR 0.87   ANIONGAP 9   ARABELLA 9.8   *   ALBUMIN 4.0   PROTTOTAL 7.9   BILITOTAL 0.5   ALKPHOS 88   ALT 15   AST 14       Recent Results (from the past 24 hour(s))   XR Chest 2 Views    Narrative    EXAM: XR CHEST 2 VIEWS  LOCATION: [facility]  DATE/TIME: 1/7/2023 1:46 PM    INDICATION: Shortness of breath  COMPARISON: X-ray 05/25/2018      Impression    IMPRESSION: No acute findings. The lungs are clear and there are no pleural effusions. Stable cardiomegaly and stable prominent tortuous thoracic aorta. Spinal degenerative changes.   US Lower Extremity Venous Duplex Bilateral    Narrative    EXAM: US LOWER EXTREMITY VENOUS DUPLEX BILATERAL  LOCATION: [facility]  DATE/TIME: 1/7/2023 2:06 PM    INDICATION: leg swelling  COMPARISON: None.  TECHNIQUE: Venous Duplex ultrasound of bilateral lower extremities with and without compression, augmentation and duplex.  Color flow and spectral Doppler with waveform analysis performed.    FINDINGS: Exam includes the common femoral, femoral, popliteal veins as well as segmentally visualized deep calf veins and greater saphenous vein.     RIGHT: No deep vein thrombosis. No superficial thrombophlebitis. No popliteal cyst. Please note, nonvisualization of the peroneal vein. Soft tissue edema of the calf.    LEFT: No deep vein thrombosis. No superficial thrombophlebitis. No popliteal cyst. Please note, nonvisualization of the peroneal vein. Soft tissue edema of the calf.      Impression    IMPRESSION:  1.  No deep venous thrombosis in the bilateral lower extremities.  2.  Nonvisualization of the peroneal veins bilaterally. Soft tissue swelling identified within the right and left calf.

## 2023-01-07 NOTE — ED PROVIDER NOTES
"  History     Chief Complaint:  Cough, Leg Swelling, and Wound Check       HPI   Deborah Higginbotham is a 89 year old female who presents with 2 weeks of what she describes as a chest cold with cough and mucus production, that is only getting slightly better over time and she believes that she may be getting a pneumonia.  She has not been tested or seen for this cough that she has had.    Also has a secondary complaint of bilateral leg swelling which is chronic for her, but also now accompanied by 1 week of what appear to be new ulcers on the right lower extremity and possibly skin changes and redness around this.  She notes some pain in this area, and some trauma and that she has fallen on believes that is the cause of the ulcers.    Notably her blood pressure is also quite high, daughter is concerned about this, and the patient did go see urgent care last week, and was given clonidine \"to be taken only if the top number of her blood pressure is over 167\".  Patient is been taking this for a week, but did not take it yesterday or today, and is hypertensive here.  Specifically denies any chest pain or abdominal pain, no shoulder pain, no numbness or tingling, headache, no neurologic complaints.      Independent Historian: Patient's daughter at bedside confirms history, adds that she does not feel safe with the patient going home alone.    Review of External Notes: Yes, home care notes and office visits and urgent care.    Allergies:  No Known Allergies     Medications:    furosemide (LASIX) 20 MG tablet  metoprolol tartrate (LOPRESSOR) 25 MG tablet  pantoprazole (PROTONIX) 40 MG EC tablet  potassium chloride (MICRO-K) 10 MEQ CR capsule  PRAVASTATIN SODIUM PO  VITAMIN D, CHOLECALCIFEROL, PO        Past Medical History:    Past Medical History:   Diagnosis Date     Cancer (H)      High cholesterol      Hypertension        Past Surgical History:    Past Surgical History:   Procedure Laterality Date     BREAST SURGERY   "     HERNIA REPAIR       ORTHOPEDIC SURGERY          Family History:    family history includes Bone Cancer in her father; Breast Cancer in her sister; Coronary Artery Disease in her father.    Social History:   reports that she has quit smoking. She has never used smokeless tobacco. She reports that she does not use drugs.  PCP: Stu Stanford     Physical Exam     Patient Vitals for the past 24 hrs:   BP Temp Temp src Pulse Resp SpO2 Height Weight   01/07/23 1249 (!) 226/118 -- -- -- -- 97 % -- --   01/07/23 1101 (!) 196/94 98.3  F (36.8  C) Temporal 63 16 93 % 1.524 m (5') 86.2 kg (190 lb)        Physical Exam  Vitals: reviewed by me  General: Pt seen on \[facility]\"", cooperative, and alert to conversation  Eyes: Tracking well, clear conjunctiva BL  ENT: MMM, midline trachea.   Lungs: No tachypnea, no accessory muscle use. No respiratory distress.   CV: Rate as above  Abd: Soft, non tender, no guarding, no rebound. Non distended  MSK: no joint effusion.  No evidence of trauma.  Does have 3+ pitting edema to bilateral lower extremities, signs of chronic swelling like hyperpigmentation are noted, but bilateral lower extremities are warm and well-perfused.  Does have several ulcers on the right lower outer leg that seem to be recently formed.  Minimal surrounding erythema noted on the right leg, not seen on the left.  Skin: No rash  Neuro: Clear speech and no facial droop.  Moving all extremity spontaneously, following all commands.  Psych: Not RIS, no e/o AH/VH      Emergency Department Course   ECG:  ECG results from 01/07/23   EKG 12 lead     Value    Systolic Blood Pressure     Diastolic Blood Pressure     Ventricular Rate 63    Atrial Rate 63    OK Interval 174    QRS Duration 88        QTc 452    P Axis 44    R AXIS -43    T Axis 24    Interpretation ECG      Sinus rhythm  Left axis deviation  Abnormal ECG  When compared with ECG of 22-JUL-2021 18:03,  Vent. rate has decreased BY  44  BPM  Borderline criteria for Anterior infarct are no longer Present  Borderline criteria for Anterolateral infarct are no longer Present  QT has shortened  Confirmed by GENERATED REPORT, COMPUTER ([id number]),  Bella Rodrigez ([id number]) on 1/7/2023 5:43:41 PM         Imaging:  US Lower Extremity Venous Duplex Bilateral   Final Result   IMPRESSION:   1.  No deep venous thrombosis in the bilateral lower extremities.   2.  Nonvisualization of the peroneal veins bilaterally. Soft tissue swelling identified within the right and left calf.      XR Chest 2 Views   Final Result   IMPRESSION: No acute findings. The lungs are clear and there are no pleural effusions. Stable cardiomegaly and stable prominent tortuous thoracic aorta. Spinal degenerative changes.           Laboratory:  Labs Ordered and Resulted from Time of ED Arrival to Time of ED Departure   COMPREHENSIVE METABOLIC PANEL - Abnormal       Result Value    Sodium 142      Potassium 3.7      Chloride 105      Carbon Dioxide (CO2) 28      Anion Gap 9      Urea Nitrogen 15      Creatinine 0.87      Calcium 9.8      Glucose 108 (*)     Alkaline Phosphatase 88      AST 14      ALT 15      Protein Total 7.9      Albumin 4.0      Bilirubin Total 0.5      GFR Estimate 63     TROPONIN I - Normal    Troponin I High Sensitivity 11     NT PROBNP INPATIENT - Normal    N terminal Pro BNP Inpatient 1,013     INFLUENZA A/B & SARS-COV2 PCR MULTIPLEX - Normal    Influenza A PCR Negative      Influenza B PCR Negative      RSV PCR Negative      SARS CoV2 PCR Negative     CBC WITH PLATELETS AND DIFFERENTIAL    WBC Count 6.5      RBC Count 4.67      Hemoglobin 14.0      Hematocrit 43.4      MCV 93      MCH 30.0      MCHC 32.3      RDW 13.0      Platelet Count 256      % Neutrophils 64      % Lymphocytes 21      % Monocytes 11      % Eosinophils 2      % Basophils 1      % Immature Granulocytes 1      NRBCs per 100 WBC 0      Absolute Neutrophils 4.2      Absolute Lymphocytes 1.3       Absolute Monocytes 0.7      Absolute Eosinophils 0.2      Absolute Basophils 0.0      Absolute Immature Granulocytes 0.1      Absolute NRBCs 0.0     BLOOD CULTURE   BLOOD CULTURE      Emergency Department Course & Assessments:      Interventions:  Medications - No data to display     Independent Interpretation (X-rays, CTs, rhythm strip):  Yes     Social Determinants of Health affecting care:  Lives home alone    Disposition:  The patient was admitted to the hospital under the care of Dr. Muir.     Impression & Plan        Medical Decision Making:  This is a very pleasant 89-year-old female who presents the emergency room with what appears to be shortness of breath and worsening leg swelling on her right side.  She notes that she did have a viral type infection for the last several weeks, and that she is actually getting over this.  X-ray does not show any evidence of pneumonia, and she is not requiring any supplemental oxygen here.  What is much more concerning to me is the erythema and skin breaks on her right leg, which do seem to be infected.    Patient's ultrasound does not show any evidence of a DVT, though there is asymmetric swelling and I do think this merits treatment, specifically IV antibiotics.  This certainly could explain why she feels a little bit more short of breath, and tired.  Additionally I would note that her blood pressure is actually very high, and she has not complained of any neurologic symptoms.  I do not see any evidence of hypertensive emergency based on her labs and exam, and she is mentating perfectly well here, even confirmed by the patient's daughter.  There may be some element of rebound hypertension here given the fact that she was on clonidine, I do not think any is the best medication for her, will plan for as needed dosing to help with her blood pressure.  No evidence of a stroke, will plan for admission to the next inpatient hospitalist.    Diagnosis:    ICD-10-CM    1.  Elevated blood pressure reading  R03.0       2. Cellulitis of right lower extremity  L03.115       3. Leg swelling  M79.89       4. Bronchitis  J40            1/7/2023   Ike Marquez*        Ike Marquez MD  01/07/23 2204

## 2023-01-07 NOTE — ED TRIAGE NOTES
Pt reports she has had a cough, congestion, and cold symptoms for the last 2 weeks.  Pt also complains of increased bilateral leg swelling and weeping, and has a wound on her right leg for the last week that she would like evaluated.      Triage Assessment     Row Name 01/07/23 1100       Triage Assessment (Adult)    Airway WDL WDL       Respiratory WDL    Respiratory WDL WDL       Skin Circulation/Temperature WDL    Skin Circulation/Temperature WDL WDL       Cardiac WDL    Cardiac WDL WDL       Peripheral/Neurovascular WDL    Peripheral Neurovascular WDL WDL       Cognitive/Neuro/Behavioral WDL    Cognitive/Neuro/Behavioral WDL WDL

## 2023-01-07 NOTE — ED NOTES
[facility]  ED Nurse Handoff Report    ED Chief complaint: Cough, Leg Swelling, and Wound Check      ED Diagnosis:   Final diagnoses:   Elevated blood pressure reading   Cellulitis of right lower extremity   Leg swelling   Bronchitis       Code Status: not discussed by ED RN     Allergies: No Known Allergies    Patient Story: 89F RLE weeping and redness, cough and congestion, hypertensive  Focused Assessment:  pt his BLE pitting edema and LE weeping. RLE has several wounds and skin is red. COVID/influenza swab not back yet. Patient has had a cold/cough x2 weeks. BP elevated in the 200s systolic.     Treatments and/or interventions provided: antibiotics,   Patient's response to treatments and/or interventions: no change    To be done/followed up on inpatient unit:      Does this patient have any cognitive concerns?: N/A    Activity level - Baseline/Home:  Unknown  Activity Level - Current:   Unknown    Patient's Preferred language: English   Needed?: No    Isolation: special precautions  Infection: COVID/influenza still pending  Patient tested for COVID 19 prior to admission: YES  Bariatric?: No    Vital Signs:   Vitals:    01/07/23 1559 01/07/23 1614 01/07/23 1629 01/07/23 1644   BP: (!) 220/88  (!) 234/106    Pulse: 71      Resp:       Temp:       TempSrc:       SpO2: 95% 96%  96%   Weight:       Height:           Cardiac Rhythm:     Was the PSS-3 completed:   Yes  What interventions are required if any?               Family Comments:   OBS brochure/video discussed/provided to patient/family: N/A              Name of person given brochure if not patient:               Relationship to patient:     For the majority of the shift this patient's behavior was Green.   Behavioral interventions performed were .    ED NURSE PHONE NUMBER: [phone]

## 2023-01-08 LAB
ALBUMIN SERPL-MCNC: 3.3 G/DL (ref 3.4–5)
ANION GAP SERPL CALCULATED.3IONS-SCNC: 11 MMOL/L (ref 3–14)
BASOPHILS # BLD AUTO: 0 10E3/UL (ref 0–0.2)
BASOPHILS NFR BLD AUTO: 0 %
BUN SERPL-MCNC: 16 MG/DL (ref 7–30)
CALCIUM SERPL-MCNC: 8.9 MG/DL (ref 8.5–10.1)
CHLORIDE BLD-SCNC: 105 MMOL/L (ref 94–109)
CO2 SERPL-SCNC: 23 MMOL/L (ref 20–32)
CREAT SERPL-MCNC: 0.85 MG/DL (ref 0.52–1.04)
ENTEROCOCCUS FAECALIS: NOT DETECTED
ENTEROCOCCUS FAECIUM: NOT DETECTED
EOSINOPHIL # BLD AUTO: 0 10E3/UL (ref 0–0.7)
EOSINOPHIL NFR BLD AUTO: 0 %
ERYTHROCYTE [DISTWIDTH] IN BLOOD BY AUTOMATED COUNT: 13.1 % (ref 10–15)
GFR SERPL CREATININE-BSD FRML MDRD: 64 ML/MIN/1.73M2
GLUCOSE BLD-MCNC: 144 MG/DL (ref 70–99)
HCT VFR BLD AUTO: 36.3 % (ref 35–47)
HGB BLD-MCNC: 12.3 G/DL (ref 11.7–15.7)
IMM GRANULOCYTES # BLD: 0 10E3/UL
IMM GRANULOCYTES NFR BLD: 1 %
LISTERIA SPECIES (DETECTED/NOT DETECTED): NOT DETECTED
LYMPHOCYTES # BLD AUTO: 1.3 10E3/UL (ref 0.8–5.3)
LYMPHOCYTES NFR BLD AUTO: 18 %
MAGNESIUM SERPL-MCNC: 2.1 MG/DL (ref 1.6–2.3)
MCH RBC QN AUTO: 30.4 PG (ref 26.5–33)
MCHC RBC AUTO-ENTMCNC: 33.9 G/DL (ref 31.5–36.5)
MCV RBC AUTO: 90 FL (ref 78–100)
MONOCYTES # BLD AUTO: 0.9 10E3/UL (ref 0–1.3)
MONOCYTES NFR BLD AUTO: 12 %
NEUTROPHILS # BLD AUTO: 4.9 10E3/UL (ref 1.6–8.3)
NEUTROPHILS NFR BLD AUTO: 69 %
NRBC # BLD AUTO: 0 10E3/UL
NRBC BLD AUTO-RTO: 0 /100
PHOSPHATE SERPL-MCNC: 3.4 MG/DL (ref 2.5–4.5)
PLATELET # BLD AUTO: 230 10E3/UL (ref 150–450)
POTASSIUM BLD-SCNC: 3.1 MMOL/L (ref 3.4–5.3)
POTASSIUM BLD-SCNC: 4 MMOL/L (ref 3.4–5.3)
RBC # BLD AUTO: 4.04 10E6/UL (ref 3.8–5.2)
SODIUM SERPL-SCNC: 139 MMOL/L (ref 133–144)
STAPHYLOCOCCUS AUREUS: NOT DETECTED
STAPHYLOCOCCUS EPIDERMIDIS: DETECTED
STAPHYLOCOCCUS LUGDUNENSIS: NOT DETECTED
STREPTOCOCCUS AGALACTIAE: NOT DETECTED
STREPTOCOCCUS ANGINOSUS GROUP: NOT DETECTED
STREPTOCOCCUS PNEUMONIAE: NOT DETECTED
STREPTOCOCCUS PYOGENES: NOT DETECTED
STREPTOCOCCUS SPECIES: NOT DETECTED
TROPONIN I SERPL HS-MCNC: 106 NG/L
TROPONIN I SERPL HS-MCNC: 107 NG/L
TROPONIN I SERPL HS-MCNC: 159 NG/L
UFH PPP CHRO-ACNC: 0.67 IU/ML
WBC # BLD AUTO: 7.1 10E3/UL (ref 4–11)

## 2023-01-08 PROCEDURE — 83735 ASSAY OF MAGNESIUM: CPT | Performed by: INTERNAL MEDICINE

## 2023-01-08 PROCEDURE — 94640 AIRWAY INHALATION TREATMENT: CPT | Mod: 76

## 2023-01-08 PROCEDURE — 250N000013 HC RX MED GY IP 250 OP 250 PS 637: Performed by: INTERNAL MEDICINE

## 2023-01-08 PROCEDURE — 999N000157 HC STATISTIC RCP TIME EA 10 MIN

## 2023-01-08 PROCEDURE — 36415 COLL VENOUS BLD VENIPUNCTURE: CPT | Performed by: INTERNAL MEDICINE

## 2023-01-08 PROCEDURE — 250N000009 HC RX 250: Performed by: INTERNAL MEDICINE

## 2023-01-08 PROCEDURE — 84484 ASSAY OF TROPONIN QUANT: CPT | Performed by: NURSE PRACTITIONER

## 2023-01-08 PROCEDURE — 84132 ASSAY OF SERUM POTASSIUM: CPT | Performed by: INTERNAL MEDICINE

## 2023-01-08 PROCEDURE — 250N000011 HC RX IP 250 OP 636: Performed by: INTERNAL MEDICINE

## 2023-01-08 PROCEDURE — 84484 ASSAY OF TROPONIN QUANT: CPT | Performed by: INTERNAL MEDICINE

## 2023-01-08 PROCEDURE — 99223 1ST HOSP IP/OBS HIGH 75: CPT | Performed by: INTERNAL MEDICINE

## 2023-01-08 PROCEDURE — 85025 COMPLETE CBC W/AUTO DIFF WBC: CPT | Performed by: INTERNAL MEDICINE

## 2023-01-08 PROCEDURE — 80069 RENAL FUNCTION PANEL: CPT | Performed by: INTERNAL MEDICINE

## 2023-01-08 PROCEDURE — 120N000001 HC R&B MED SURG/OB

## 2023-01-08 PROCEDURE — 85520 HEPARIN ASSAY: CPT | Performed by: INTERNAL MEDICINE

## 2023-01-08 PROCEDURE — 94640 AIRWAY INHALATION TREATMENT: CPT

## 2023-01-08 PROCEDURE — 99233 SBSQ HOSP IP/OBS HIGH 50: CPT | Performed by: INTERNAL MEDICINE

## 2023-01-08 PROCEDURE — 250N000012 HC RX MED GY IP 250 OP 636 PS 637: Performed by: INTERNAL MEDICINE

## 2023-01-08 RX ORDER — PREDNISONE 20 MG/1
40 TABLET ORAL DAILY
Status: COMPLETED | OUTPATIENT
Start: 2023-01-08 | End: 2023-01-12

## 2023-01-08 RX ORDER — FUROSEMIDE 10 MG/ML
20 INJECTION INTRAMUSCULAR; INTRAVENOUS EVERY 8 HOURS
Status: DISCONTINUED | OUTPATIENT
Start: 2023-01-08 | End: 2023-01-09

## 2023-01-08 RX ORDER — CARVEDILOL 25 MG/1
25 TABLET ORAL 2 TIMES DAILY WITH MEALS
Status: DISCONTINUED | OUTPATIENT
Start: 2023-01-08 | End: 2023-01-12 | Stop reason: HOSPADM

## 2023-01-08 RX ORDER — CODEINE PHOSPHATE AND GUAIFENESIN 10; 100 MG/5ML; MG/5ML
10 SOLUTION ORAL EVERY 4 HOURS PRN
Status: DISCONTINUED | OUTPATIENT
Start: 2023-01-08 | End: 2023-01-12 | Stop reason: HOSPADM

## 2023-01-08 RX ORDER — POTASSIUM CHLORIDE 1500 MG/1
40 TABLET, EXTENDED RELEASE ORAL ONCE
Status: COMPLETED | OUTPATIENT
Start: 2023-01-08 | End: 2023-01-08

## 2023-01-08 RX ORDER — AZITHROMYCIN 250 MG/1
250 TABLET, FILM COATED ORAL DAILY
Status: COMPLETED | OUTPATIENT
Start: 2023-01-08 | End: 2023-01-11

## 2023-01-08 RX ORDER — HEPARIN SODIUM 10000 [USP'U]/100ML
0-5000 INJECTION, SOLUTION INTRAVENOUS CONTINUOUS
Status: DISCONTINUED | OUTPATIENT
Start: 2023-01-08 | End: 2023-01-10

## 2023-01-08 RX ORDER — FUROSEMIDE 10 MG/ML
20 INJECTION INTRAMUSCULAR; INTRAVENOUS EVERY 8 HOURS
Status: DISCONTINUED | OUTPATIENT
Start: 2023-01-08 | End: 2023-01-08

## 2023-01-08 RX ORDER — CODEINE PHOSPHATE AND GUAIFENESIN 10; 100 MG/5ML; MG/5ML
10 SOLUTION ORAL EVERY 4 HOURS PRN
Status: DISCONTINUED | OUTPATIENT
Start: 2023-01-08 | End: 2023-01-08

## 2023-01-08 RX ORDER — BENZONATATE 100 MG/1
100 CAPSULE ORAL 3 TIMES DAILY PRN
Status: DISCONTINUED | OUTPATIENT
Start: 2023-01-08 | End: 2023-01-12 | Stop reason: HOSPADM

## 2023-01-08 RX ORDER — CARVEDILOL 6.25 MG/1
6.25 TABLET ORAL 2 TIMES DAILY WITH MEALS
Status: DISCONTINUED | OUTPATIENT
Start: 2023-01-08 | End: 2023-01-08

## 2023-01-08 RX ORDER — PANTOPRAZOLE SODIUM 40 MG/1
40 TABLET, DELAYED RELEASE ORAL
Status: DISCONTINUED | OUTPATIENT
Start: 2023-01-08 | End: 2023-01-08

## 2023-01-08 RX ORDER — ASPIRIN 81 MG/1
81 TABLET ORAL DAILY
Status: DISCONTINUED | OUTPATIENT
Start: 2023-01-08 | End: 2023-01-10

## 2023-01-08 RX ORDER — FUROSEMIDE 10 MG/ML
40 INJECTION INTRAMUSCULAR; INTRAVENOUS
Status: DISCONTINUED | OUTPATIENT
Start: 2023-01-08 | End: 2023-01-08

## 2023-01-08 RX ADMIN — CARVEDILOL 25 MG: 25 TABLET, FILM COATED ORAL at 17:55

## 2023-01-08 RX ADMIN — AMLODIPINE BESYLATE 5 MG: 5 TABLET ORAL at 10:04

## 2023-01-08 RX ADMIN — LEVALBUTEROL HYDROCHLORIDE 1.25 MG: 1.25 SOLUTION, CONCENTRATE RESPIRATORY (INHALATION) at 20:09

## 2023-01-08 RX ADMIN — HEPARIN SODIUM 1000 UNITS/HR: 10000 INJECTION, SOLUTION INTRAVENOUS at 10:39

## 2023-01-08 RX ADMIN — POTASSIUM CHLORIDE 10 MEQ: 750 TABLET, EXTENDED RELEASE ORAL at 10:04

## 2023-01-08 RX ADMIN — POTASSIUM CHLORIDE 10 MEQ: 750 TABLET, EXTENDED RELEASE ORAL at 21:06

## 2023-01-08 RX ADMIN — CEFAZOLIN SODIUM 2 G: 2 INJECTION, SOLUTION INTRAVENOUS at 21:06

## 2023-01-08 RX ADMIN — Medication 2000 UNITS: at 10:04

## 2023-01-08 RX ADMIN — POTASSIUM CHLORIDE 40 MEQ: 1500 TABLET, EXTENDED RELEASE ORAL at 10:08

## 2023-01-08 RX ADMIN — LEVALBUTEROL HYDROCHLORIDE 1.25 MG: 1.25 SOLUTION, CONCENTRATE RESPIRATORY (INHALATION) at 15:54

## 2023-01-08 RX ADMIN — CEFAZOLIN SODIUM 2 G: 2 INJECTION, SOLUTION INTRAVENOUS at 12:37

## 2023-01-08 RX ADMIN — GUAIFENESIN AND CODEINE PHOSPHATE 10 ML: 100; 10 SOLUTION ORAL at 17:54

## 2023-01-08 RX ADMIN — GUAIFENESIN AND DEXTROMETHORPHAN 10 ML: 100; 10 SYRUP ORAL at 10:11

## 2023-01-08 RX ADMIN — CARVEDILOL 6.25 MG: 6.25 TABLET, FILM COATED ORAL at 10:05

## 2023-01-08 RX ADMIN — GUAIFENESIN AND DEXTROMETHORPHAN 10 ML: 100; 10 SYRUP ORAL at 05:01

## 2023-01-08 RX ADMIN — PANTOPRAZOLE SODIUM 40 MG: 40 TABLET, DELAYED RELEASE ORAL at 06:35

## 2023-01-08 RX ADMIN — FUROSEMIDE 20 MG: 10 INJECTION, SOLUTION INTRAMUSCULAR; INTRAVENOUS at 18:01

## 2023-01-08 RX ADMIN — LEVALBUTEROL HYDROCHLORIDE 1.25 MG: 1.25 SOLUTION, CONCENTRATE RESPIRATORY (INHALATION) at 07:56

## 2023-01-08 RX ADMIN — ASPIRIN 81 MG: 81 TABLET, COATED ORAL at 10:04

## 2023-01-08 RX ADMIN — FLUTICASONE PROPIONATE 1 SPRAY: 50 SPRAY, METERED NASAL at 10:09

## 2023-01-08 RX ADMIN — HYDRALAZINE HYDROCHLORIDE 10 MG: 20 INJECTION, SOLUTION INTRAMUSCULAR; INTRAVENOUS at 07:37

## 2023-01-08 RX ADMIN — AZITHROMYCIN MONOHYDRATE 250 MG: 250 TABLET ORAL at 17:55

## 2023-01-08 RX ADMIN — PREDNISONE 40 MG: 20 TABLET ORAL at 10:03

## 2023-01-08 RX ADMIN — PRAVASTATIN SODIUM 40 MG: 20 TABLET ORAL at 21:06

## 2023-01-08 RX ADMIN — FUROSEMIDE 20 MG: 10 INJECTION, SOLUTION INTRAMUSCULAR; INTRAVENOUS at 10:12

## 2023-01-08 RX ADMIN — CEFAZOLIN SODIUM 2 G: 2 INJECTION, SOLUTION INTRAVENOUS at 05:10

## 2023-01-08 RX ADMIN — LEVALBUTEROL HYDROCHLORIDE 1.25 MG: 1.25 SOLUTION, CONCENTRATE RESPIRATORY (INHALATION) at 11:56

## 2023-01-08 RX ADMIN — BENZONATATE 100 MG: 100 CAPSULE ORAL at 21:47

## 2023-01-08 RX ADMIN — LORATADINE 10 MG: 10 TABLET ORAL at 10:04

## 2023-01-08 RX ADMIN — BENZONATATE 100 MG: 100 CAPSULE ORAL at 10:47

## 2023-01-08 ASSESSMENT — ACTIVITIES OF DAILY LIVING (ADL)
ADLS_ACUITY_SCORE: 37

## 2023-01-08 NOTE — PROVIDER NOTIFICATION
MD Notification    Notified Person: MD    Notified Person Name: Dr. Rondon    Notification Date/Time: 0720    Notification Interaction: Vocera    Purpose of Notification: Critical Lab of Troponin 159    Orders Received: Order received,  said to recheck in 4 hours.     Comments:

## 2023-01-08 NOTE — PROVIDER NOTIFICATION
"Dr. Canchola paged: \" Pt reporting chest tightness. Do you want EKG ordered? Also has scheduled albuterol nebs but HR is one teens and pt gets tremulous from neb,  switch neb?\"    Repaged to Dr. Muir:  \"Pt reporting chest tightness. Do you want EKG ordered? Also has scheduled albuterol nebs but HR is one teens and pt gets tremulous from neb,  switch neb?\"  "

## 2023-01-08 NOTE — PROGRESS NOTES
[facility]    Hospitalist Progress Note    Date of Service (when I saw the patient): 01/08/2023    Assessment & Plan   Deborah Higginbotham is a 89 year old female who was admitted on 1/7/2023.    Assessment and plan  This is a 89-year-old female with history of breast cancer, hypertension, hyperlipidemia, GERD, came to the ED with multiple problems including cold and cough for the last couple of weeks, not getting better, nasal drainage, uncontrolled blood pressure and developing swelling and redness on the lower extremities.    Right lower extremity cellulitis with venous stasis ulcer:  This is a 89-year-old female with history of uncontrolled hypertension and longstanding leg swelling.  Now presented with worsening lower extremity swelling. Most likely this is venous stasis and venous stasis ulcer causing her cellulitis on the right leg.  Right leg is erythematous, warm and slightly tender with weeping ulcers.  At this point, she received vancomycin and ceftriaxone in the ED.  I will hold it on that.  on cefazolin 2 grams every 8 hours.    Elevate her leg. Ask OT to put the lymphedema wraps and elevate her legs.  Continue with IV cefazolin at this time.  Bilateral lower extremity ultrasound was negative for DVT.  Soft tissue swelling identified within the right and left calf    2.  Bilateral lower extremity swelling:    Hypokalemia secondary to diuresis  The cause is uncertain at this time. Given her uncontrolled hypertension, need to rule out any congestive heart failure.  We are going to start her on IV Lasix 20 mg q3crrev   Echo ordered   Potassium low at 3.1 replacement protocol ordered    Recurrent chest pain in the setting of hypertensive emergency and acute bronchitis ;  Patient complains of intermittent chest pain and is also coughing significantly cough is not controlled by Robitussin so added Tessalon Perles today  Patient had an RRT overnight seen by house provider for chest  pain  Serial tropes ordered  Serial tropes 11-  Most likely chest pain is secondary to hypertensive emergency as well as her significant cough with acute bronchitis  Elevated troponin most likely secondary demand ischemia but cannot rule out non-STEMI  Started on baby aspirin and heparin drips  Echo currently pending  Cardiology consultation requested regarding further evaluation management    .  Hypertensive emergency The patient's blood pressure is uncontrolled at this time.  It is 226/118 at the most.  She is only on metoprolol.  I do not think that is enough to control her blood pressure.  She was given a dose of IV hydralazine in the ED.   Patient was started on Norvasc 5 mg p.o. daily and lisinopril 10 mg p.o. daily on admission  Her blood pressures systolics overnight reduced to 110s so lisinopril was held  Blood pressure is running high again at 182/80 this morning  Switched metoprolol to Coreg 6.25 mg p.o. twice daily.  Continue to hold lisinopril for now to simplify the regimen  Continue Norvasc 5 mg p.o. daily  As needed labetalol and hydralazine available for blood pressure control  Monitor blood pressure closely and adjust meds as needed    4.  Acute bronchitis and postnasal drip:  The patient has had cold symptoms for the last couple of weeks. It is getting slightly better, but still coughing quite a bit, coughing up some phlegm.  Chest x-ray does not show any obvious pneumonia.  On examination, does have some wheezing.  I think most likely she has acute bronchitis.  Started on Z-Shorty on admission  DuoNeb nebulization, Claritin nasal spray and Flonase nasal spray to see if her symptoms improve with that.  Patient with significant ongoing cough and complaints of chest congestion and chest pain  Started her on prednisone 40 mg p.o. daily for 5 days to help with respiratory symptoms and acute bronchitis  Morbid obesity with BMI of 36.7;  Complicates care    5.  Hyperlipidemia: On pravastatin.  We  will continue with that.  6.  History of gastroesophageal reflux disease:  On Protonix.  We will continue with that.  7.  DVT prophylaxis ; on heparin drip currently    Clinically Significant Risk Factors Present on Admission        # Hypokalemia: Lowest K = 3.1 mmol/L in last 2 days, will replace as needed    # Hypercalcemia: corrected calcium is >10.1, will monitor as appropriate    # Hypoalbuminemia: Lowest albumin = 3.3 g/dL at 1/8/2023  5:47 AM, will monitor as appropriate     # Hypertension: home medication list includes antihypertensive(s)      # Obesity: Estimated body mass index is 36.73 kg/m  as calculated from the following:    Height as of this encounter: 1.524 m (5').    Weight as of this encounter: 85.3 kg (188 lb 0.8 oz).               Code Status: No CPR- Do NOT Intubate discussed with patient on admission     Disposition: Expected discharge in 2 to 3 days once symptoms improve and blood pressure better controlled    Discussed with bedside RN patient today    Greater than 55 minutes was spent in reviewing the chart, lab work and reviewing the imaging since admission and taking care of her today    Kimberly Rondon MD, MD  [phone] (P)      Interval History   Patient is resting comfortably in bed.  Coughing intermittently.  Complains of intermittent chest pain.  Complains of significant cough not controlled with Robitussin.  Blood pressure is running high systolics in the 180s currently.  Had an RRT for chest pain overnight.  Serial troponins mildly elevated this morning.  No other acute issues since yesterday    -Data reviewed today: I reviewed all new labs and imaging results over the last 24 hours. I personally reviewed the chest x-ray image(s) showing No effusions no pneumonia.    Physical Exam   Temp: 98.4  F (36.9  C) Temp src: Oral BP: 101/75 Pulse: 114   Resp: 18 SpO2: 94 % O2 Device: None (Room air)    Vitals:    01/07/23 1101 01/08/23 0500   Weight: 86.2 kg (190 lb) 85.3 kg (188 lb 0.8 oz)      Vital Signs with Ranges  Temp:  [97.8  F (36.6  C)-98.6  F (37  C)] 98.4  F (36.9  C)  Pulse:  [] 114  Resp:  [16-36] 18  BP: (101-236)/() 101/75  SpO2:  [93 %-98 %] 94 %  I/O last 3 completed shifts:  In: 320 [P.O.:320]  Out: 500 [Urine:500]    Constitutional: Awake, alert, cooperative, no apparent distress  Respiratory: Coarse breath sounds, no wheezing currently  Cardiovascular: Regular rate and rhythm, normal S1 and S2, and no murmur noted  GI: Normal bowel sounds, soft, non-distended, non-tender  Skin/Integumen: No rashes, no cyanosis, 2+  Edema with erythema that is improving  Other:     Medications     heparin 1,000 Units/hr (01/08/23 1039)       amLODIPine  5 mg Oral Daily     aspirin  81 mg Oral Daily     azithromycin  250 mg Oral Daily     carvedilol  6.25 mg Oral BID w/meals     ceFAZolin  2 g Intravenous Q8H     fluticasone  1 spray Both Nostrils Daily     furosemide  20 mg Intravenous Q8H     levalbuterol  1.25 mg Nebulization 4x daily     loratadine  10 mg Oral Daily     pantoprazole  40 mg Oral QAM AC     potassium chloride ER  10 mEq Oral BID     pravastatin  40 mg Oral At Bedtime     predniSONE  40 mg Oral Daily     sodium chloride (PF)  3 mL Intracatheter Q8H     vitamin D3  2,000 Units Oral Daily       Data   Recent Labs   Lab 01/08/23  0547 01/07/23  2101 01/07/23  1241   WBC 7.1 8.8 6.5   HGB 12.3 13.3 14.0   MCV 90 89 93    256 256    139 142   POTASSIUM 3.1* 3.6 3.7   CHLORIDE 105 104 105   CO2 23 23 28   BUN 16 16 15   CR 0.85 0.81 0.92  0.87   ANIONGAP 11 12 9   ARABELLA 8.9 9.2 9.8   * 116* 108*   ALBUMIN 3.3*  --  4.0   PROTTOTAL  --   --  7.9   BILITOTAL  --   --  0.5   ALKPHOS  --   --  88   ALT  --   --  15   AST  --   --  14       Recent Results (from the past 24 hour(s))   XR Chest 2 Views    Narrative    EXAM: XR CHEST 2 VIEWS  LOCATION: [facility]  DATE/TIME: 1/7/2023 1:46 PM    INDICATION: Shortness of breath  COMPARISON:  X-ray 05/25/2018      Impression    IMPRESSION: No acute findings. The lungs are clear and there are no pleural effusions. Stable cardiomegaly and stable prominent tortuous thoracic aorta. Spinal degenerative changes.   US Lower Extremity Venous Duplex Bilateral    Narrative    EXAM: US LOWER EXTREMITY VENOUS DUPLEX BILATERAL  LOCATION: [facility]  DATE/TIME: 1/7/2023 2:06 PM    INDICATION: leg swelling  COMPARISON: None.  TECHNIQUE: Venous Duplex ultrasound of bilateral lower extremities with and without compression, augmentation and duplex. Color flow and spectral Doppler with waveform analysis performed.    FINDINGS: Exam includes the common femoral, femoral, popliteal veins as well as segmentally visualized deep calf veins and greater saphenous vein.     RIGHT: No deep vein thrombosis. No superficial thrombophlebitis. No popliteal cyst. Please note, nonvisualization of the peroneal vein. Soft tissue edema of the calf.    LEFT: No deep vein thrombosis. No superficial thrombophlebitis. No popliteal cyst. Please note, nonvisualization of the peroneal vein. Soft tissue edema of the calf.      Impression    IMPRESSION:  1.  No deep venous thrombosis in the bilateral lower extremities.  2.  Nonvisualization of the peroneal veins bilaterally. Soft tissue swelling identified within the right and left calf.

## 2023-01-08 NOTE — SIGNIFICANT EVENT
Rapid Response Team Note    Assessment   In assessment a rapid response was called on Deborah Higginbotham due to acute chest pain. This presentation is likely due to hypotension and increased cardiac demand.    Patient presented to the hospital mid morning on 1/7 with significant hypotension 226/118.  Upon arrival to the room patient's blood pressure is 113/55 with an HR of 96 suspect to be due to recent albuterol administration.  With her hypotension and increased cardiac demand she began to have chest pain.  This chest pain felt as though it was heavy pressure that radiated from her upper sternum to left shoulder.  This discomfort resolved within 10 minutes and EKG showed irregular rate yet sinus rhythm.  EKG was negative for any ST elevation or T wave abnormalities.  With spontaneous resolution of her chest pain and negative EKG findings continuing current bed placement is appropriate and we will evaluate laboratory values throughout the night.    Plan     Troponin every 3 hours x 3-troponin so far negative    Holding morning lisinopril for hypotension    EKG completed    CBC, BMP: No abnormalities noted  -  Disposition: The patient will remain on the current unit. We will continue to monitor this patient closely.  -  Reassessment and plan follow-up will be performed by the primary team      MADI Rodrigues text paging  [facility] Paging/[facility]    Hospital Course   Brief Summary of events leading to rapid response:   Sternal chest pressure that radiated to the left shoulder after receiving albuterol treatment.    Admission Diagnosis:   Elevated blood pressure reading [R03.0]  Bronchitis [J40]  Leg swelling [M79.89]  Cellulitis of right lower extremity [L03.115]    Physical Exam   Temp: 97.8  F (36.6  C) Temp  Min: 97.8  F (36.6  C)  Max: 98.3  F (36.8  C)  Resp: 16 Resp  Min: 16  Max: 16  SpO2: 93 % SpO2  Min: 93 %  Max: 98 %  Pulse: 91 Pulse  Min: 63  Max: 117    No data recorded  BP: 113/55 Systolic  (24hrs), Av , Min:113 , Max:236   Diastolic (24hrs), Av, Min:55, Max:118     I/Os: No intake/output data recorded.     Exam:   General: in no acute distress  Mental Status: AAOx4.  Neuro: +follows commands wiggle toes and show 2 fingers bilat, face symmetric, tongue midline, speech fluent  Head, ENT & mouth: NC/AT,  mouth moist oral mucosa  Neck: supple  CV: Irregularly irregular  resp: CTAB upper and lower lobes  gi:normoactive bowel sounds, soft, nontender, nondisteded  Ext: Bilateral +2 lower extremity edema  Musculoskeletal no bony joint deformities        Significant Results and Procedures   Lactic Acid:   Recent Labs   Lab Test 21  1449 21  0935 21  1305   LACT 1.6 2.7* 4.6*     CBC:   Recent Labs   Lab Test 23  1241 21  0646 21  0647   WBC 6.5 9.7 5.2   HGB 14.0 13.2 13.9   HCT 43.4 39.6 42.7    236 210        Sepsis Evaluation   The patient is not known to have an infection.  NO EVIDENCE OF SEPSIS at this time.  Vital sign, physical exam, and lab findings are due to Primary diagnosis.

## 2023-01-08 NOTE — PLAN OF CARE
Pt arrived to floor at 1835  Orientation: A&Ox4, [place]  Vitals/Pain: VSS on RA sating >90%, ex HTN, SBP in the 200's - given scheduled Lasix, amlodipine and lisinopril. Pt reports RLE ulcer pain, pt refused oxycodone, managing with PRN Tylenol    Tele: NSR   Lines/Drains: R PIV saline locked   LS: wheezing present, duoneb done   GI/: BS +, x0 BM this shift   Labs: Abnormal/Trends, Electrolyte Replacement- K recheck in AM no replacement needed, Mag lab added - pending value   Ambulation/Assist: x1-2 GB and walker to bedside commode   Skin/Wounds: RLE ulcers, BLE +3 weeping edema, BLE pulses palpable, CMS intact   Plan: echo to be completed, PRN BP meds available, ABX, wound consult placed, lymph edema consult placed      Goal Outcome Evaluation:  Overall Patient Progress: no change, pt arrived from ED at 1835   Outcome Evaluation: Pt started on abx, tolerating RA sating >90%.

## 2023-01-08 NOTE — H&P
Admitted: 01/07/2023    HISTORY OF PRESENT ILLNESS:  This is a 89-year-old female with history of breast cancer, hypertension, hyperlipidemia, GERD, came to the ED with multiple problems including cold and cough for the last couple of weeks, not getting better, nasal drainage, uncontrolled blood pressure and developing swelling and redness on the lower extremities.    According to the patient, she started having cold symptoms right after [place].  Since then, she has been coughing, coughing up some phlegm.  She has drainage from her nose and postnasal drip as well. Most of the time, she swallows most of her phlegm.  She is not very active recently because of that and may get occasionally fatigued and tired as well and stays mostly in one piece.  Also, noticed by her family is that her blood pressure has been uncontrolled.  She was taken to the urgent care on last Wednesday on 01/04/2023.  They gave her a dose of clonidine and sent her home.  She only takes metoprolol at home.  Blood pressure remains on the higher side.  She also noticed that over that period, she has developed swelling in her legs.  It has been getting worse.  She always has swelling, but it has been getting more worse recently.  She developed redness to the right leg and developed ulcers on her right lower extremity as well, which started weeping as well. Because of these concerns, the family brought her to the ED for evaluation.  The patient denies any fever, chills, chest pain, orthopnea, PND, palpitation, headache, dizziness, lightheadedness.  No abdominal pain, back pain, dysuria, hematuria, constipation, or diarrhea at this time.  The patient has been compliant with her medication.  She has been taking diuretics.  She has been taking metoprolol, but has uncontrolled blood pressure.  Rest of the review of system is negative at this time.    ASSESSMENT AND PLAN:     1.  Right lower extremity cellulitis with venous stasis ulcer:  This is a  89-year-old female with history of uncontrolled hypertension and longstanding leg swelling.  Now presented with worsening lower extremity swelling. Most likely this is venous stasis and venous stasis ulcer causing her cellulitis on the right leg.  Right leg is erythematous, warm and slightly tender with weeping ulcers.  At this point, she received vancomycin and ceftriaxone in the ED.  I will hold it on that.  We will keep her on cefazolin 2 grams every 8 hours.  Elevate her leg. Ask OT to put the lymphedema wraps and elevate her legs.  Continue with IV cefazolin at this time.  2.  Bilateral lower extremity swelling:  The cause is uncertain at this time. Given her uncontrolled hypertension, need to rule out any congestive heart failure.  We are going to start her on IV Lasix 20 mg b.i.d. at this time.  3.  Hypertensive urgency:  The patient's blood pressure is uncontrolled at this time.  It is 226/118 at the most.  She is only on metoprolol.  I do not think that is enough to control her blood pressure.  She was given a dose of IV hydralazine in the ED.  I will put her on lisinopril 10 mg and amlodipine 5 mg and continue with the metoprolol 25 mg b.i.d.  May change metoprolol to Coreg if her blood pressure remains unstable.  She will be on IV Lasix 20 mg b.i.d. as well.  We will see how she does.  If her blood pressure remains uncontrolled, we can slowly titrate those medications.  I will do echocardiogram to rule out any hypertensive cardiomyopathy.  4.  Acute bronchitis and postnasal drip:  The patient has had cold symptoms for the last couple of weeks. It is getting slightly better, but still coughing quite a bit, coughing up some phlegm.  Chest x-ray does not show any obvious pneumonia.  On examination, does have some wheezing.  I think most likely she has acute bronchitis.  I will put her on azithromycin p.o. 500 mg for 3 days, DuoNeb nebulization, Claritin nasal spray and Flonase nasal spray to see if her  symptoms improve with that.  5.  Hyperlipidemia: On pravastatin.  We will continue with that.  6.  History of gastroesophageal reflux disease:  On Protonix.  We will continue with that.  7.  DVT prophylaxis with Lovenox.    CODE STATUS:  DNR/DNI as per the patient wishes.    The case was discussed with the ED physician, the patient's daughter who is the POA and the nursing staff taking care of the patient.    Berto Muir MD        D: 2023   T: 2023   MT: DAIN    Name:     TANYA GIBBS  MRN:      [id number]        Account:     [id number]   :      1931           Admitted:    2023       Document: [id number]

## 2023-01-08 NOTE — PLAN OF CARE
Goal Outcome Evaluation:      Plan of Care Reviewed With: patient    Pt is A&Ox4, VSS, on RA. RLE errythmatous with three open ulcers, minmal amount of weeping present. CMS intact ex redness to RLE. LS with expiraptory wheezes at beninning of shift, wheezes subsided throughout and LS diminished. BS+, flatus+, voiding. +3 edema in BLE. Pt is up with assistx1 and pivot to BSC. On 2 GM Na+ diet. RRT called earlier this shift d/t chest pain/tightness- see provider note for details. Pt has denied any chest pain since RRT. Tele: NSR. Pt reported pain in buttock from being uncomfortable in bed- repositioning declined at times. Pt was educated on importance of offloading weight on her bottom.

## 2023-01-08 NOTE — PROGRESS NOTES
RECEIVING UNIT ED HANDOFF REVIEW    ED Nurse Handoff Report was reviewed by: Esthela Dugan RN on January 7, 2023 at 6:06 PM

## 2023-01-08 NOTE — CONSULTS
Cardiology Consultation      Deborah Higginbotham MRN# [id number]   YOB: 1931 Age: 89 year old   Date of Admission: 1/7/2023     Reason for consult:  Chest discomfort, elevated troponin           Assessment and Plan:     1. Hypertensive urgency with chest discomfort and elevated troponin    Agree with diuresis to help with blood pressure and edema.  Agree with echo to evaluate LV function.  N-terminal pro BNP not severely elevated.  Troponin mildly elevated but temporally correlated with her chest discomfort.    Plan nuclear stress test on Monday.  Patient is DNR/DNI but would consider percutaneous revascularization and temporarily resending DNR status if revascularization would help symptoms.          High complexity medical decision making  Acute illness that poses a threat to life or bodily function: Hypertensive emergency with chest discomfort and elevated troponin    High complexity data review  Unique tests ordered: Nuclear stress test Monday  Unique results reviewed: N-terminal proBNP 1/7/2023, troponin 1/7-8/2023, echocardiogram 7/20/2018  Independent interpretation of a test performed by another physician: ECG from 1/7/2023 at 8:44 PM, frequent ectopy  External documents reviewed: ZIO monitor from 2019 with no significant arrhythmia             Chief Complaint:   Cough, Leg Swelling, and Wound Check           History of Present Illness:   This patient is a 89 year old female with no previous cardiac history.  I am discussing with her as well as her son.  She has had chest discomfort along with hypertensive emergency with pressures greater than 200 mmHg.  Her troponins have increased along with her chest discomfort.  She has nonspecific ST-T but frequent ectopy on her ECG from 1/7/2023 when I personally independently reviewed the tracings.    She has lower extremity edema and N-terminal proBNP is elevated but not severely so.         Physical Exam:   Vitals were reviewed  Blood pressure (!)  144/81, pulse 98, temperature 97.7  F (36.5  C), temperature source Oral, resp. rate 18, height 1.524 m (5'), weight 84.5 kg (186 lb 3.2 oz), SpO2 94 %.  Temperatures:  Current - Temp: 97.7  F (36.5  C); Max - Temp  Av.1  F (36.7  C)  Min: 97.7  F (36.5  C)  Max: 98.6  F (37  C)  Respiration range: Resp  Av  Min: 16  Max: 36  Pulse range: Pulse  Av.7  Min: 68  Max: 117  Blood pressure range: Systolic (24hrs), Av , Min:101 , Max:236   ; Diastolic (24hrs), Av, Min:55, Max:118    Pulse oximetry range: SpO2  Av.5 %  Min: 93 %  Max: 98 %    Intake/Output Summary (Last 24 hours) at 2023 1307  Last data filed at 2023 1246  Gross per 24 hour   Intake 420 ml   Output 700 ml   Net -280 ml     Constitutional:   awake, alert, cooperative, no apparent distress, and appears stated age     Eyes:   Lids and lashes normal, pupils equal, round and reactive to light, extra ocular muscles intact, sclera clear, conjunctiva normal     Neck:   supple, symmetrical, trachea midline,      Back:   symmetric     Lungs:   Symmetric     Cardiovascular:    regular with ectopy     Abdomen:   non-tender     Musculoskeletal:   motor strength is 5 out of 5 all extremities bilaterally     Neurologic:   Grossly nonfocal     Skin:   normal skin color, texture, turgor     Additional findings:   Lower extremity edema                 Past Medical History:   I have reviewed this patient's past medical history  Past Medical History:   Diagnosis Date     Cancer (H)     breast     High cholesterol      Hypertension              Past Surgical History:   I have reviewed this patient's past surgical history  Past Surgical History:   Procedure Laterality Date     BREAST SURGERY       HERNIA REPAIR       ORTHOPEDIC SURGERY                 Social History:   I have reviewed this patient's social history  Social History     Tobacco Use     Smoking status: Former     Smokeless tobacco: Never     Tobacco comments:     quit in the 70s    Substance Use Topics     Alcohol use: Not on file     Comment: daily              Family History:   I have reviewed this patient's family history  Family History   Problem Relation Age of Onset     Coronary Artery Disease Father      Bone Cancer Father      Breast Cancer Sister              Allergies:   No Known Allergies          Medications:   I have reviewed this patient's current medications  Medications Prior to Admission   Medication Sig Dispense Refill Last Dose     cloNIDine (CATAPRES) 0.2 MG tablet Take 0.2 mg by mouth 2 times daily as needed (for SBP>165)   prn     furosemide (LASIX) 20 MG tablet Take 40 mg by mouth daily   1/7/2023 at am     magnesium oxide (MAG-OX) 400 MG tablet Take 400 mg by mouth daily   1/7/2023 at am     meloxicam (MOBIC) 15 MG tablet Take 15 mg by mouth daily   1/6/2023     metoprolol tartrate (LOPRESSOR) 25 MG tablet Take 25 mg by mouth 2 times daily   1/7/2023 at am     potassium chloride (MICRO-K) 10 MEQ CR capsule Take 10 mEq by mouth 2 times daily Morning & Noon.   1/7/2023 at am     PRAVASTATIN SODIUM PO Take 40 mg by mouth At Bedtime    1/6/2023 at pm     psyllium (METAMUCIL/KONSYL) Packet Take 1 packet by mouth daily as needed for constipation   prn     VITAMIN D, CHOLECALCIFEROL, PO Take 2,000 Units by mouth daily    1/7/2023 at am     pantoprazole (PROTONIX) 40 MG EC tablet Take 1 tablet (40 mg) by mouth every morning (before breakfast) (Patient not taking: Reported on 1/7/2023) 30 tablet 0 Not Taking     Current Facility-Administered Medications Ordered in Epic   Medication Dose Route Frequency Last Rate Last Admin     acetaminophen (TYLENOL) tablet 650 mg  650 mg Oral Q6H PRN   650 mg at 01/07/23 1859    Or     acetaminophen (TYLENOL) Suppository 650 mg  650 mg Rectal Q6H PRN         amLODIPine (NORVASC) tablet 5 mg  5 mg Oral Daily   5 mg at 01/08/23 1004     aspirin EC tablet 81 mg  81 mg Oral Daily   81 mg at 01/08/23 1004     azithromycin (ZITHROMAX) tablet 250 mg   250 mg Oral Daily         benzonatate (TESSALON) capsule 100 mg  100 mg Oral TID PRN   100 mg at 01/08/23 1047     carvedilol (COREG) tablet 25 mg  25 mg Oral BID w/meals         ceFAZolin (ANCEF) 2 g in 100 mL D5W intermittent infusion  2 g Intravenous Q8H 200 mL/hr at 01/08/23 1237 2 g at 01/08/23 1237     fluticasone (FLONASE) 50 MCG/ACT spray 1 spray  1 spray Both Nostrils Daily   1 spray at 01/08/23 1009     furosemide (LASIX) injection 20 mg  20 mg Intravenous Q8H   20 mg at 01/08/23 1012     guaiFENesin-dextromethorphan (ROBITUSSIN DM) 100-10 MG/5ML syrup 10 mL  10 mL Oral Q4H PRN   10 mL at 01/08/23 1011     heparin 25,000 units in 0.45% NaCl 250 mL ANTICOAGULANT infusion  0-5,000 Units/hr Intravenous Continuous 10 mL/hr at 01/08/23 1039 1,000 Units/hr at 01/08/23 1039     hydrALAZINE (APRESOLINE) injection 10 mg  10 mg Intravenous Q4H PRN   10 mg at 01/08/23 0737     labetalol (NORMODYNE/TRANDATE) injection 10 mg  10 mg Intravenous Q2H PRN         levalbuterol (XOPENEX CONC) neb solution 1.25 mg  1.25 mg Nebulization 4x daily   1.25 mg at 01/08/23 1156     lidocaine (LMX4) cream   Topical Q1H PRN         lidocaine 1 % 0.1-1 mL  0.1-1 mL Other Q1H PRN         loratadine (CLARITIN) tablet 10 mg  10 mg Oral Daily   10 mg at 01/08/23 1004     melatonin tablet 1 mg  1 mg Oral At Bedtime PRN         naloxone (NARCAN) injection 0.2 mg  0.2 mg Intravenous Q2 Min PRN        Or     naloxone (NARCAN) injection 0.4 mg  0.4 mg Intravenous Q2 Min PRN        Or     naloxone (NARCAN) injection 0.2 mg  0.2 mg Intramuscular Q2 Min PRN        Or     naloxone (NARCAN) injection 0.4 mg  0.4 mg Intramuscular Q2 Min PRN         ondansetron (ZOFRAN ODT) ODT tab 4 mg  4 mg Oral Q6H PRN        Or     ondansetron (ZOFRAN) injection 4 mg  4 mg Intravenous Q6H PRN         oxyCODONE (ROXICODONE) tablet 5 mg  5 mg Oral Q4H PRN         pantoprazole (PROTONIX) EC tablet 40 mg  40 mg Oral QAM AC   40 mg at 01/08/23 0684     polyethylene  glycol (MIRALAX) Packet 17 g  17 g Oral Daily PRN         potassium chloride ER (KLOR-CON M) CR tablet 10 mEq  10 mEq Oral BID   10 mEq at 01/08/23 1004     pravastatin (PRAVACHOL) tablet 40 mg  40 mg Oral At Bedtime   40 mg at 01/07/23 2200     predniSONE (DELTASONE) tablet 40 mg  40 mg Oral Daily   40 mg at 01/08/23 1003     prochlorperazine (COMPAZINE) injection 5 mg  5 mg Intravenous Q6H PRN        Or     prochlorperazine (COMPAZINE) tablet 5 mg  5 mg Oral Q6H PRN        Or     prochlorperazine (COMPAZINE) suppository 12.5 mg  12.5 mg Rectal Q12H PRN         senna-docusate (SENOKOT-S/PERICOLACE) 8.6-50 MG per tablet 1 tablet  1 tablet Oral BID PRN        Or     senna-docusate (SENOKOT-S/PERICOLACE) 8.6-50 MG per tablet 2 tablet  2 tablet Oral BID PRN         sodium chloride (PF) 0.9% PF flush 3 mL  3 mL Intracatheter Q8H   3 mL at 01/08/23 1012     sodium chloride (PF) 0.9% PF flush 3 mL  3 mL Intracatheter q1 min prn         vitamin D3 (CHOLECALCIFEROL) tablet 2,000 Units  2,000 Units Oral Daily   2,000 Units at 01/08/23 1004     No current [facility]-ordered outpatient medications on file.             Review of Systems:     The 10 point Review of Systems is negative other than noted in the HPI            Data:   All laboratory data reviewed  Results for orders placed or performed during the hospital encounter of 01/07/23 (from the past 24 hour(s))   XR Chest 2 Views    Narrative    EXAM: XR CHEST 2 VIEWS  LOCATION: [facility]  DATE/TIME: 1/7/2023 1:46 PM    INDICATION: Shortness of breath  COMPARISON: X-ray 05/25/2018      Impression    IMPRESSION: No acute findings. The lungs are clear and there are no pleural effusions. Stable cardiomegaly and stable prominent tortuous thoracic aorta. Spinal degenerative changes.   US Lower Extremity Venous Duplex Bilateral    Narrative    EXAM: US LOWER EXTREMITY VENOUS DUPLEX BILATERAL  LOCATION: [facility]  DATE/TIME: 1/7/2023  2:06 PM    INDICATION: leg swelling  COMPARISON: None.  TECHNIQUE: Venous Duplex ultrasound of bilateral lower extremities with and without compression, augmentation and duplex. Color flow and spectral Doppler with waveform analysis performed.    FINDINGS: Exam includes the common femoral, femoral, popliteal veins as well as segmentally visualized deep calf veins and greater saphenous vein.     RIGHT: No deep vein thrombosis. No superficial thrombophlebitis. No popliteal cyst. Please note, nonvisualization of the peroneal vein. Soft tissue edema of the calf.    LEFT: No deep vein thrombosis. No superficial thrombophlebitis. No popliteal cyst. Please note, nonvisualization of the peroneal vein. Soft tissue edema of the calf.      Impression    IMPRESSION:  1.  No deep venous thrombosis in the bilateral lower extremities.  2.  Nonvisualization of the peroneal veins bilaterally. Soft tissue swelling identified within the right and left calf.   Blood Culture Peripheral Blood    Specimen: Peripheral Blood   Result Value Ref Range    Culture No growth after 12 hours    Blood Culture Peripheral Blood    Specimen: Peripheral Blood   Result Value Ref Range    Culture No growth after 12 hours    Symptomatic Influenza A/B & SARS-CoV2 (COVID-19) Virus PCR Multiplex Nasopharyngeal    Specimen: Nasopharyngeal; Swab   Result Value Ref Range    Influenza A PCR Negative Negative    Influenza B PCR Negative Negative    RSV PCR Negative Negative    SARS CoV2 PCR Negative Negative    Narrative    Testing was performed using the Xpert Xpress CoV2/Flu/RSV Assay on the [employer] GeneXpert Instrument. This test should be ordered for the detection of SARS-CoV-2 and influenza viruses in individuals who meet clinical and/or epidemiological criteria. Test performance is unknown in asymptomatic patients. This test is for in vitro diagnostic use under the FDA EUA for laboratories certified under CLIA to perform high or moderate complexity testing.  This test has not been FDA cleared or approved. A negative result does not rule out the presence of PCR inhibitors in the specimen or target RNA in concentration below the limit of detection for the assay. If only one viral target is positive but coinfection with multiple targets is suspected, the sample should be re-tested with another FDA cleared, approved, or authorized test, if coinfection would change clinical management. This test was validated by the [facility]. These laboratories are certified under the Clinical Laboratory Improvement Amendments of 1988 (CLIA-88) as qualified to perform high complexity laboratory testing.   EKG 12-lead, tracing only   Result Value Ref Range    Systolic Blood Pressure  mmHg    Diastolic Blood Pressure  mmHg    Ventricular Rate 85 BPM    Atrial Rate 85 BPM    KY Interval 152 ms    QRS Duration 80 ms     ms    QTc 440 ms    P Axis 35 degrees    R AXIS -55 degrees    T Axis 26 degrees    Interpretation ECG       Sinus rhythm with Premature supraventricular complexes and with occasional Premature ventricular complexes  Left axis deviation  Nonspecific ST and T wave abnormality  Abnormal ECG  When compared with ECG of 07-JAN-2023 13:02,  Premature ventricular complexes are now Present  Premature supraventricular complexes are now Present  ST now depressed in Anterior leads     Troponin I   Result Value Ref Range    Troponin I High Sensitivity 20 <54 ng/L   CBC with platelets   Result Value Ref Range    WBC Count 8.8 4.0 - 11.0 10e3/uL    RBC Count 4.31 3.80 - 5.20 10e6/uL    Hemoglobin 13.3 11.7 - 15.7 g/dL    Hematocrit 38.5 35.0 - 47.0 %    MCV 89 78 - 100 fL    MCH 30.9 26.5 - 33.0 pg    MCHC 34.5 31.5 - 36.5 g/dL    RDW 12.9 10.0 - 15.0 %    Platelet Count 256 150 - 450 10e3/uL   Basic metabolic panel   Result Value Ref Range    Sodium 139 133 - 144 mmol/L    Potassium 3.6 3.4 - 5.3 mmol/L    Chloride 104 94 - 109 mmol/L    Carbon Dioxide (CO2) 23 20  - 32 mmol/L    Anion Gap 12 3 - 14 mmol/L    Urea Nitrogen 16 7 - 30 mg/dL    Creatinine 0.81 0.52 - 1.04 mg/dL    Calcium 9.2 8.5 - 10.1 mg/dL    Glucose 116 (H) 70 - 99 mg/dL    GFR Estimate 68 >60 mL/min/1.73m2   Phosphorus   Result Value Ref Range    Phosphorus 3.7 2.5 - 4.5 mg/dL   Troponin I   Result Value Ref Range    Troponin I High Sensitivity 38 <54 ng/L   Lactic Acid STAT   Result Value Ref Range    Lactic Acid 1.7 0.7 - 2.0 mmol/L   CBC with platelets differential    Narrative    The following orders were created for panel order CBC with platelets differential.  Procedure                               Abnormality         Status                     ---------                               -----------         ------                     CBC with platelets and d...[[id number]]                      Final result                 Please view results for these tests on the individual orders.   Troponin I   Result Value Ref Range    Troponin I High Sensitivity 159 (HH) <54 ng/L   Renal panel   Result Value Ref Range    Sodium 139 133 - 144 mmol/L    Potassium 3.1 (L) 3.4 - 5.3 mmol/L    Chloride 105 94 - 109 mmol/L    Carbon Dioxide (CO2) 23 20 - 32 mmol/L    Anion Gap 11 3 - 14 mmol/L    Urea Nitrogen 16 7 - 30 mg/dL    Creatinine 0.85 0.52 - 1.04 mg/dL    Calcium 8.9 8.5 - 10.1 mg/dL    Glucose 144 (H) 70 - 99 mg/dL    Albumin 3.3 (L) 3.4 - 5.0 g/dL    Phosphorus 3.4 2.5 - 4.5 mg/dL    GFR Estimate 64 >60 mL/min/1.73m2   Magnesium   Result Value Ref Range    Magnesium 2.1 1.6 - 2.3 mg/dL   CBC with platelets and differential   Result Value Ref Range    WBC Count 7.1 4.0 - 11.0 10e3/uL    RBC Count 4.04 3.80 - 5.20 10e6/uL    Hemoglobin 12.3 11.7 - 15.7 g/dL    Hematocrit 36.3 35.0 - 47.0 %    MCV 90 78 - 100 fL    MCH 30.4 26.5 - 33.0 pg    MCHC 33.9 31.5 - 36.5 g/dL    RDW 13.1 10.0 - 15.0 %    Platelet Count 230 150 - 450 10e3/uL    % Neutrophils 69 %    % Lymphocytes 18 %    % Monocytes 12 %    % Eosinophils 0  %    % Basophils 0 %    % Immature Granulocytes 1 %    NRBCs per 100 WBC 0 <1 /100    Absolute Neutrophils 4.9 1.6 - 8.3 10e3/uL    Absolute Lymphocytes 1.3 0.8 - 5.3 10e3/uL    Absolute Monocytes 0.9 0.0 - 1.3 10e3/uL    Absolute Eosinophils 0.0 0.0 - 0.7 10e3/uL    Absolute Basophils 0.0 0.0 - 0.2 10e3/uL    Absolute Immature Granulocytes 0.0 <=0.4 10e3/uL    Absolute NRBCs 0.0 10e3/uL       No results found for: CHOL  No results found for: HDL  No results found for: LDL  No results found for: TRIG  No results found for: CHOLHDLRATIO  No results found for: TSH      EKG results:    Echocardiology:    Cardiac stress testing:    Cardiac angiography:    Clinically Significant Risk Factors Present on Admission       # Hypokalemia: Lowest K = 3.1 mmol/L in last 2 days, will replace as needed    # Hypercalcemia: corrected calcium is >10.1, will monitor as appropriate    # Hypoalbuminemia: Lowest albumin = 3.3 g/dL at 1/8/2023  5:47 AM, will monitor as appropriate      # Obesity: Estimated body mass index is 36.36 kg/m  as calculated from the following:    Height as of this encounter: 1.524 m (5').    Weight as of this encounter: 84.5 kg (186 lb 3.2 oz).

## 2023-01-09 ENCOUNTER — APPOINTMENT (OUTPATIENT)
Dept: OCCUPATIONAL THERAPY | Facility: CLINIC | Age: 88
DRG: 602 | End: 2023-01-09
Attending: INTERNAL MEDICINE
Payer: COMMERCIAL

## 2023-01-09 ENCOUNTER — APPOINTMENT (OUTPATIENT)
Dept: CARDIOLOGY | Facility: CLINIC | Age: 88
DRG: 602 | End: 2023-01-09
Attending: INTERNAL MEDICINE
Payer: COMMERCIAL

## 2023-01-09 LAB
ANION GAP SERPL CALCULATED.3IONS-SCNC: 9 MMOL/L (ref 3–14)
BUN SERPL-MCNC: 23 MG/DL (ref 7–30)
CALCIUM SERPL-MCNC: 9.1 MG/DL (ref 8.5–10.1)
CHLORIDE BLD-SCNC: 107 MMOL/L (ref 94–109)
CO2 SERPL-SCNC: 21 MMOL/L (ref 20–32)
CREAT SERPL-MCNC: 0.99 MG/DL (ref 0.52–1.04)
ERYTHROCYTE [DISTWIDTH] IN BLOOD BY AUTOMATED COUNT: 13.2 % (ref 10–15)
GFR SERPL CREATININE-BSD FRML MDRD: 54 ML/MIN/1.73M2
GLUCOSE BLD-MCNC: 119 MG/DL (ref 70–99)
HCT VFR BLD AUTO: 38.2 % (ref 35–47)
HGB BLD-MCNC: 12.7 G/DL (ref 11.7–15.7)
LVEF ECHO: NORMAL
MAGNESIUM SERPL-MCNC: 2.2 MG/DL (ref 1.6–2.3)
MCH RBC QN AUTO: 30.5 PG (ref 26.5–33)
MCHC RBC AUTO-ENTMCNC: 33.2 G/DL (ref 31.5–36.5)
MCV RBC AUTO: 92 FL (ref 78–100)
PLATELET # BLD AUTO: 252 10E3/UL (ref 150–450)
POTASSIUM BLD-SCNC: 3.8 MMOL/L (ref 3.4–5.3)
RBC # BLD AUTO: 4.16 10E6/UL (ref 3.8–5.2)
SODIUM SERPL-SCNC: 137 MMOL/L (ref 133–144)
UFH PPP CHRO-ACNC: 0.19 IU/ML
UFH PPP CHRO-ACNC: 0.29 IU/ML
UFH PPP CHRO-ACNC: 0.31 IU/ML
UFH PPP CHRO-ACNC: 0.48 IU/ML
WBC # BLD AUTO: 9.1 10E3/UL (ref 4–11)

## 2023-01-09 PROCEDURE — 99222 1ST HOSP IP/OBS MODERATE 55: CPT | Performed by: INTERNAL MEDICINE

## 2023-01-09 PROCEDURE — 999N000208 ECHOCARDIOGRAM COMPLETE

## 2023-01-09 PROCEDURE — 36415 COLL VENOUS BLD VENIPUNCTURE: CPT | Performed by: INTERNAL MEDICINE

## 2023-01-09 PROCEDURE — 85520 HEPARIN ASSAY: CPT | Performed by: HOSPITALIST

## 2023-01-09 PROCEDURE — 85520 HEPARIN ASSAY: CPT | Performed by: INTERNAL MEDICINE

## 2023-01-09 PROCEDURE — 250N000011 HC RX IP 250 OP 636: Performed by: HOSPITALIST

## 2023-01-09 PROCEDURE — 87040 BLOOD CULTURE FOR BACTERIA: CPT | Performed by: INTERNAL MEDICINE

## 2023-01-09 PROCEDURE — 250N000012 HC RX MED GY IP 250 OP 636 PS 637: Performed by: INTERNAL MEDICINE

## 2023-01-09 PROCEDURE — 255N000002 HC RX 255 OP 636: Performed by: INTERNAL MEDICINE

## 2023-01-09 PROCEDURE — 94640 AIRWAY INHALATION TREATMENT: CPT

## 2023-01-09 PROCEDURE — 94640 AIRWAY INHALATION TREATMENT: CPT | Mod: 76

## 2023-01-09 PROCEDURE — 83735 ASSAY OF MAGNESIUM: CPT | Performed by: INTERNAL MEDICINE

## 2023-01-09 PROCEDURE — 250N000013 HC RX MED GY IP 250 OP 250 PS 637: Performed by: INTERNAL MEDICINE

## 2023-01-09 PROCEDURE — 120N000001 HC R&B MED SURG/OB

## 2023-01-09 PROCEDURE — 250N000013 HC RX MED GY IP 250 OP 250 PS 637: Performed by: HOSPITALIST

## 2023-01-09 PROCEDURE — 97165 OT EVAL LOW COMPLEX 30 MIN: CPT | Mod: GO

## 2023-01-09 PROCEDURE — 93306 TTE W/DOPPLER COMPLETE: CPT | Mod: 26 | Performed by: INTERNAL MEDICINE

## 2023-01-09 PROCEDURE — 999N000157 HC STATISTIC RCP TIME EA 10 MIN

## 2023-01-09 PROCEDURE — 97140 MANUAL THERAPY 1/> REGIONS: CPT | Mod: GO

## 2023-01-09 PROCEDURE — 97110 THERAPEUTIC EXERCISES: CPT | Mod: GO

## 2023-01-09 PROCEDURE — 82435 ASSAY OF BLOOD CHLORIDE: CPT | Performed by: INTERNAL MEDICINE

## 2023-01-09 PROCEDURE — G0463 HOSPITAL OUTPT CLINIC VISIT: HCPCS | Mod: 25

## 2023-01-09 PROCEDURE — 99233 SBSQ HOSP IP/OBS HIGH 50: CPT | Mod: 25 | Performed by: PHYSICIAN ASSISTANT

## 2023-01-09 PROCEDURE — 250N000009 HC RX 250: Performed by: HOSPITALIST

## 2023-01-09 PROCEDURE — 99232 SBSQ HOSP IP/OBS MODERATE 35: CPT | Performed by: HOSPITALIST

## 2023-01-09 PROCEDURE — 99207 PR SC NO CHARGE VISIT: CPT | Performed by: INTERNAL MEDICINE

## 2023-01-09 PROCEDURE — 36415 COLL VENOUS BLD VENIPUNCTURE: CPT | Performed by: HOSPITALIST

## 2023-01-09 PROCEDURE — 250N000011 HC RX IP 250 OP 636: Performed by: INTERNAL MEDICINE

## 2023-01-09 PROCEDURE — 250N000009 HC RX 250: Performed by: INTERNAL MEDICINE

## 2023-01-09 PROCEDURE — 85027 COMPLETE CBC AUTOMATED: CPT | Performed by: INTERNAL MEDICINE

## 2023-01-09 PROCEDURE — 97535 SELF CARE MNGMENT TRAINING: CPT | Mod: GO

## 2023-01-09 RX ORDER — FUROSEMIDE 10 MG/ML
20 INJECTION INTRAMUSCULAR; INTRAVENOUS EVERY 8 HOURS
Status: COMPLETED | OUTPATIENT
Start: 2023-01-09 | End: 2023-01-09

## 2023-01-09 RX ORDER — IPRATROPIUM BROMIDE AND ALBUTEROL SULFATE 2.5; .5 MG/3ML; MG/3ML
3 SOLUTION RESPIRATORY (INHALATION)
Status: DISCONTINUED | OUTPATIENT
Start: 2023-01-09 | End: 2023-01-11

## 2023-01-09 RX ORDER — VANCOMYCIN HYDROCHLORIDE 1 G/200ML
1000 INJECTION, SOLUTION INTRAVENOUS EVERY 24 HOURS
Status: DISCONTINUED | OUTPATIENT
Start: 2023-01-09 | End: 2023-01-09

## 2023-01-09 RX ORDER — GUAIFENESIN 600 MG/1
600 TABLET, EXTENDED RELEASE ORAL 2 TIMES DAILY
Status: DISCONTINUED | OUTPATIENT
Start: 2023-01-09 | End: 2023-01-12 | Stop reason: HOSPADM

## 2023-01-09 RX ADMIN — SENNOSIDES AND DOCUSATE SODIUM 1 TABLET: 50; 8.6 TABLET ORAL at 23:09

## 2023-01-09 RX ADMIN — CEFAZOLIN SODIUM 2 G: 2 INJECTION, SOLUTION INTRAVENOUS at 14:08

## 2023-01-09 RX ADMIN — LEVALBUTEROL HYDROCHLORIDE 1.25 MG: 1.25 SOLUTION, CONCENTRATE RESPIRATORY (INHALATION) at 15:14

## 2023-01-09 RX ADMIN — PRAVASTATIN SODIUM 40 MG: 20 TABLET ORAL at 21:01

## 2023-01-09 RX ADMIN — POTASSIUM CHLORIDE 10 MEQ: 750 TABLET, EXTENDED RELEASE ORAL at 09:22

## 2023-01-09 RX ADMIN — AZITHROMYCIN MONOHYDRATE 250 MG: 250 TABLET ORAL at 09:22

## 2023-01-09 RX ADMIN — CARVEDILOL 25 MG: 25 TABLET, FILM COATED ORAL at 17:32

## 2023-01-09 RX ADMIN — PANTOPRAZOLE SODIUM 40 MG: 40 TABLET, DELAYED RELEASE ORAL at 06:34

## 2023-01-09 RX ADMIN — VANCOMYCIN HYDROCHLORIDE 1000 MG: 1 INJECTION, SOLUTION INTRAVENOUS at 10:28

## 2023-01-09 RX ADMIN — GUAIFENESIN 600 MG: 600 TABLET, EXTENDED RELEASE ORAL at 20:45

## 2023-01-09 RX ADMIN — LEVALBUTEROL HYDROCHLORIDE 1.25 MG: 1.25 SOLUTION, CONCENTRATE RESPIRATORY (INHALATION) at 07:29

## 2023-01-09 RX ADMIN — LORATADINE 10 MG: 10 TABLET ORAL at 09:22

## 2023-01-09 RX ADMIN — HEPARIN SODIUM 850 UNITS/HR: 10000 INJECTION, SOLUTION INTRAVENOUS at 07:39

## 2023-01-09 RX ADMIN — HUMAN ALBUMIN MICROSPHERES AND PERFLUTREN 9 ML: 10; .22 INJECTION, SOLUTION INTRAVENOUS at 11:56

## 2023-01-09 RX ADMIN — CEFAZOLIN SODIUM 2 G: 2 INJECTION, SOLUTION INTRAVENOUS at 20:45

## 2023-01-09 RX ADMIN — Medication 2000 UNITS: at 09:22

## 2023-01-09 RX ADMIN — FUROSEMIDE 20 MG: 10 INJECTION, SOLUTION INTRAMUSCULAR; INTRAVENOUS at 09:26

## 2023-01-09 RX ADMIN — ASPIRIN 81 MG: 81 TABLET, COATED ORAL at 09:22

## 2023-01-09 RX ADMIN — IPRATROPIUM BROMIDE AND ALBUTEROL SULFATE 3 ML: .5; 3 SOLUTION RESPIRATORY (INHALATION) at 19:00

## 2023-01-09 RX ADMIN — POTASSIUM CHLORIDE 10 MEQ: 750 TABLET, EXTENDED RELEASE ORAL at 20:45

## 2023-01-09 RX ADMIN — AMLODIPINE BESYLATE 5 MG: 5 TABLET ORAL at 09:22

## 2023-01-09 RX ADMIN — FUROSEMIDE 20 MG: 10 INJECTION, SOLUTION INTRAMUSCULAR; INTRAVENOUS at 17:33

## 2023-01-09 RX ADMIN — HEPARIN SODIUM 850 UNITS/HR: 10000 INJECTION, SOLUTION INTRAVENOUS at 01:02

## 2023-01-09 RX ADMIN — FLUTICASONE PROPIONATE 1 SPRAY: 50 SPRAY, METERED NASAL at 09:34

## 2023-01-09 RX ADMIN — FUROSEMIDE 20 MG: 10 INJECTION, SOLUTION INTRAMUSCULAR; INTRAVENOUS at 01:01

## 2023-01-09 RX ADMIN — CEFAZOLIN SODIUM 2 G: 2 INJECTION, SOLUTION INTRAVENOUS at 05:34

## 2023-01-09 RX ADMIN — CARVEDILOL 25 MG: 25 TABLET, FILM COATED ORAL at 09:22

## 2023-01-09 RX ADMIN — PREDNISONE 40 MG: 20 TABLET ORAL at 09:22

## 2023-01-09 ASSESSMENT — ACTIVITIES OF DAILY LIVING (ADL)
ADLS_ACUITY_SCORE: 39
ADLS_ACUITY_SCORE: 37
ADLS_ACUITY_SCORE: 39
ADLS_ACUITY_SCORE: 39
ADLS_ACUITY_SCORE: 37
ADLS_ACUITY_SCORE: 39
DEPENDENT_IADLS:: CLEANING;SHOPPING;TRANSPORTATION
ADLS_ACUITY_SCORE: 37
ADLS_ACUITY_SCORE: 39
ADLS_ACUITY_SCORE: 37
ADLS_ACUITY_SCORE: 37

## 2023-01-09 NOTE — PHARMACY-VANCOMYCIN DOSING SERVICE
Pharmacy Vancomycin Initial Note  Date of Service 2023  Patient's  [id number]  89 year old, female    Indication: Bacteremia    Current estimated CrCl = Estimated Creatinine Clearance: 35.6 mL/min (based on SCr of 0.99 mg/dL).    Creatinine for last 3 days  2023: 12:41 PM Creatinine 0.87 mg/dL; 12:41 PM Creatinine 0.92 mg/dL;  9:01 PM Creatinine 0.81 mg/dL  2023:  5:47 AM Creatinine 0.85 mg/dL  2023:  5:23 AM Creatinine 0.99 mg/dL    Recent Vancomycin Level(s) for last 3 days  No results found for requested labs within last 72 hours.      Vancomycin IV Administrations (past 72 hours)                   vancomycin (VANCOCIN) 2,000 mg in 0.9% NaCl 500 mL intermittent infusion (mg) 2,000 mg New Bag 23 1651                Nephrotoxins and other renal medications (From now, onward)    Start     Dose/Rate Route Frequency Ordered Stop    23 0900  vancomycin (VANCOCIN) 1000 mg in dextrose 5% 200 mL PREMIX         1,000 mg  200 mL/hr over 1 Hours Intravenous EVERY 24 HOURS 23 0819      23 0930  furosemide (LASIX) injection 20 mg         20 mg  over 1-3 Minutes Intravenous EVERY 8 HOURS 23 0852            Contrast Orders - past 72 hours (72h ago, onward)    None          [employer] Prediction of Planned Initial Vancomycin Regimen  Loading dose: N/A  Regimen: 1000 mg IV every 24 hours.  Start time: 09:17 on 2023  Exposure target: AUC24 (range)400-600 mg/L.hr   AUC24,ss: 434 mg/L.hr  Probability of AUC24 > 400: 59 %  Ctrough,ss: 13.7 mg/L  Probability of Ctrough,ss > 20: 17 %  Probability of nephrotoxicity (Lodise NATACHA ): 9 %          Plan:  1. Start vancomycin  1000 mg IV q24h. (2 gm x 1 given in ED  ~ 1700)  2. Vancomycin monitoring method: AUC  3. Vancomycin therapeutic monitoring goal: 400-600 mg*h/L  4. Pharmacy will check vancomycin levels as appropriate in 1-3 Days.    5. Serum creatinine levels will be ordered daily for the first week of therapy and at  least twice weekly for subsequent weeks.      La Aleman, RPH

## 2023-01-09 NOTE — PROVIDER NOTIFICATION
"Paged Sushant IZAGUIRRE:    \"FYI update to (+) BC result earlier, (+) for Staphylococcus epidermidis\"  "

## 2023-01-09 NOTE — PLAN OF CARE
AxOx4. Tele = NSR.    VSS on RA. Assist x1 w/ GB/W. 2g Na diet. Up to bathroom/bedside commode with adequate output. No BM this shift.    PIV x2, infusing Heparin at 850. Next Xa at 0650.    RLE erythematous with 3 open/scabbing ulcers on the lower lateral shin, LIYA with minimal drainage.    Denies pain / nausea.    Critical microbiology result x2: blood cultures (+) for Staphylococcus epidermidis; 2nd blood cultures (+) for Gram-positive cocci in clusters, provider notified of both results.      Plan for stress test, WOC consult, and OT consult today. Discharge plan pending.

## 2023-01-09 NOTE — CONSULTS
Care Management Initial Consult    General Information  Assessment completed with: Patient, Children, Daughter Briana  Type of CM/SW Visit: Initial Assessment    Primary Care Provider verified and updated as needed: Yes   Readmission within the last 30 days: no previous admission in last 30 days      Reason for Consult: discharge planning  Advance Care Planning: Advance Care Planning Reviewed: no concerns identified          Communication Assessment  Patient's communication style: spoken language (English or Bilingual)    Hearing Difficulty or Deaf: yes        Cognitive  Cognitive/Neuro/Behavioral: WDL                      Living Environment:   People in home: alone     Current living Arrangements: independent living facility      Able to return to prior arrangements: other (see comments) (await PT/OT recommendations)  Living Arrangement Comments: Independent living [facility]    Family/Social Support:  Care provided by: self, homecare agency  Provides care for: no one  Marital Status:   Children          Description of Support System: Supportive    Support Assessment: Lacks necessary supervision and assistance, Lacks adequate physical care, Limited social contact and support    Current Resources:   Patient receiving home care services: Yes  Skilled Home Care Services: Skilled Nursing, Physicial Therapy, Occupational Therapy  Community Resources:    Equipment currently used at home: walker, rolling  Supplies currently used at home:      Employment/Financial:  Employment Status: retired     Employment/ Comments: Paent did not work out of the home  Financial Concerns: No concerns identified           Lifestyle & Psychosocial Needs:  Social Determinants of Health     Tobacco Use: Medium Risk     Smoking Tobacco Use: Former     Smokeless Tobacco Use: Never     Passive Exposure: Not on file   Alcohol Use: Not on file   Financial Resource Strain: Not on file   Food Insecurity: Not on file    Transportation Needs: Not on file   Physical Activity: Not on file   Stress: Not on file   Social Connections: Not on file   Intimate Partner Violence: Not on file   Depression: Not on file   Housing Stability: Not on file       Functional Status:  Prior to admission patient needed assistance:   Dependent ADLs:: Ambulation-walker  Dependent IADLs:: Cleaning, Shopping, Transportation       Mental Health Status:  Mental Health Status: No Current Concerns       Chemical Dependency Status:  Chemical Dependency Status: No Current Concerns             Values/Beliefs:  Spiritual, Cultural Beliefs, [employer] Practices, Values that affect care:    NA             Additional Information:  Care Coordinator met with patient and explained role in discharge planning.  Also conversed with patient's Daughter Briana after patient gave permission to call her.  Patient seems to have a few current health issues at the present time with right lower extremity cellulitis with venous stasis ulcer, issues with longstanding hypertension and bronchitis.  Patient was coughing the entire time writer met with her.    Patient resides in independent living at [facility] [place].  She has a cleaning lady and uses a walker for mobility.  Briana reported patient has fallen twice in the past few months.  Patient recently was opened to [facility] RN/PT/OT.  Patient no longer drives.  In discussion with Briana, she feels her mother needs more assistance that cannot be done by family due to they are needing to keep their employment.  When writer discussed transition to a TCU if this was recommended by therapies, patient felt she was strong enough to transition home.  Await PT/OT recommendations.  Unfortunately, patient has [employer] insurance, so if a TCU is needed, we will need to wait for authorization.    Care Management will continue to follow for safe discharge planning.      Rosalba Mercado RN  Inpatient Care Management  [phone]

## 2023-01-09 NOTE — PROGRESS NOTES
[facility]    Medicine Progress Note - Hospitalist Service    Date of Admission:  1/7/2023    Assessment & Plan   Deborah Higginbotham is a 89 year old female who was admitted on 1/7/2023.    Assessment and plan  This is a 89-year-old female with history of breast cancer, hypertension, hyperlipidemia, GERD, came to the ED with multiple problems including cold and cough for the last couple of weeks, not getting better, nasal drainage, uncontrolled blood pressure and developing swelling and redness on the lower extremities.    Right lower extremity cellulitis with venous stasis ulcer  1/2 Blood cx, staph epi, likely contaminant  Presented with worsening lower extremity swelling. Most likely this is venous stasis and venous stasis ulcer causing her cellulitis on the right leg. Right leg is erythematous, warm and slightly tender with weeping ulcers. Received vancomycin and ceftriaxone in the ED. Bilateral lower extremity US was negative for DVT. Blood cx with staph epi  - continue IV cefazolin 2g q8h while inpatient, discharge with oral keflex to complete 7 day course tx.  - lymphedema consulted  - WOC appreciated  - ID appreciated  - recommended to TCU. Patient has [employer] which may make placement difficult    Bilateral lower extremity swelling:    Hypokalemia secondary to diuresis  The cause is uncertain at this time. Given her uncontrolled hypertension, need to rule out any congestive heart failure.    * Echo: per cardiologist read--normal biventricular size and systolic function.  - IV Lasix 20 mg [id number], last dose 1/9 evening    Recurrent chest pain in the setting of hypertensive emergency and acute bronchitis  NSTEMI, suspect type 2  Patient complains of intermittent chest pain and also cough is not controlled by Robitussin, tessalon added. Had RRT for chest pain, serial trop 11-. Elevated troponin most likely secondary demand ischemia but cannot rule out non-STEMI  - Most likely  chest pain is secondary to hypertensive emergency as well as her significant cough with acute bronchitis  - Started on baby aspirin and heparin drip  - cardiology appreciated, plan stress test 1/11    Hypertensive emergency- resolved  BP up to 226/118. PTA on metoprolol.  - appreciate cardiology input  - coreg 25mg BID, amlodipine 5mg daily added  - Diuresing with lasix 20mg IV q8h, last dose 1/9 evening  - As needed labetalol and hydralazine available for blood pressure control    Acute bronchitis and postnasal drip:   The patient has had cold symptoms for the last couple of weeks. It is getting slightly better, but still coughing quite a bit, coughing up some phlegm.  Chest x-ray does not show any obvious pneumonia.  On examination, does have some wheezing, likely acute bronchitis  - Started on Z-Shorty on admission  - duoneb QID  - mucinex BID  - Prednisone 40 mg p.o. daily for 5 days    Morbid obesity with BMI of 36.7;  Complicates care     Hyperlipidemia: PTA pravastatin continued  History of gastroesophageal reflux disease:  PTA protonix continued     Diet: Combination Diet Regular Diet Adult; 2 gm NA Diet    DVT Prophylaxis: heparin  Ferraro Catheter: Not present  Lines: None     Cardiac Monitoring: ACTIVE order. Indication: HYPERTENSIVE URGENCY  Code Status: No CPR- Do NOT Intubate      Clinically Significant Risk Factors        # Hypokalemia: Lowest K = 3.1 mmol/L in last 2 days, will replace as needed       # Hypoalbuminemia: Lowest albumin = 3.3 g/dL at 1/8/2023  5:47 AM, will monitor as appropriate           # Obesity: Estimated body mass index is 36.3 kg/m  as calculated from the following:    Height as of this encounter: 1.524 m (5').    Weight as of this encounter: 84.3 kg (185 lb 13.6 oz)., PRESENT ON ADMISSION         Disposition Plan      Expected Discharge Date: 01/11/2023,  3:00 PM    Destination: home with help/services;inpatient rehabilitation facility  Discharge Comments: 1/9 echo, nuc med test  today, D/C w/ HC          Laurel Benitez  Hospitalist Service  [facility]  Securely message with [employer] (more info)  Text page via [employer] Paging/Directory   ______________________________________________________________________    Interval History   Patient seen and examined. She is resting in chair. She was awaiting stress test, but this will be delayed until tomorrow. She has ongoing cough. Denies chest pain currently. Discussed briefly with CC.    Physical Exam   Vital Signs: Temp: 98.7  F (37.1  C) Temp src: Oral BP: 113/74 Pulse: 74   Resp: 18 SpO2: 92 % O2 Device: None (Room air)    Weight: 185 lbs 13.56 oz    Constitutional: Awake, alert, cooperative, no apparent distress  Respiratory: Clear to auscultation bilaterally, no crackles or wheezing  Cardiovascular: Regular rate and rhythm, normal S1 and S2, and no murmur noted  GI: Normal bowel sounds, soft, non-distended, non-tender  Skin/Integumen: No rashes, no cyanosis, +1 lower extremity edema, RLE venous ulcers  Other:     Medical Decision Making       35 MINUTES SPENT BY ME on the date of service doing chart review, history, exam, documentation & further activities per the note.      Data     I have personally reviewed the following data over the past 24 hrs:    9.1  \   12.7   / 252     137 107 23 /  119 (H)   3.8 21 0.99 \       Imaging results reviewed over the past 24 hrs:   Recent Results (from the past 24 hour(s))   Echocardiogram Complete   Result Value    LVEF  60-65%    Narrative    [id number]  [id number]  [id number]  [id number]^ABIGAIL^MARIPOSA^NESTOR     [facility]  Echocardiography Laboratory  [address] [place], [place] [address]     Name: TANYA GIBBS  MRN: [id number]  : 1931  Study Date: 2023 11:19 AM  Age: 89 yrs  Gender: Female  Patient Location: [facility]  Reason For Study: Hypertension (HTN)  Ordering Physician: MARIPOSA HAYES  Referring Physician: Stu Stanford  Performed By: Gwen  Gabriella     BSA: 1.8 m2  Height: 60 in  Weight: 185 lb  HR: 66  BP: 135/77 mmHg  ______________________________________________________________________________  Procedure  Complete Echo Adult.  ______________________________________________________________________________  Interpretation Summary     1. Normal biventricular size and function. Left ventricular ejection fraction  of 60-65%. Mild LVH.  2. No segmental wall motion abnormalities noted.  3. No hemodynamically significant valvular disease.  Compared to the previous echocardiogram, there are no significant changes.  ______________________________________________________________________________  Left Ventricle  The left ventricle is normal in size. There is mild eccentric left ventricular  hypertrophy. Left ventricular systolic function is normal. The visual ejection  fraction is 60-65%. Grade I or early diastolic dysfunction. No regional wall  motion abnormalities noted.     Right Ventricle  The right ventricle is normal in structure, function and size.     Atria  The left atrium is mildly dilated. Right atrial size is normal.     Mitral Valve  The mitral valve is normal in structure and function. There is moderate mitral  annular calcification. There is trace mitral regurgitation.     Tricuspid Valve  No tricuspid regurgitation. Right ventricular systolic pressure could not be  approximated due to inadequate tricuspid regurgitation.     Aortic Valve  The aortic valve is not well visualized. No aortic stenosis is present.     Pulmonic Valve  The pulmonic valve is not well visualized.     Vessels  Normal size aorta. IVC diameter <2.1 cm collapsing >50% with sniff suggests a  normal RA pressure of 3 mmHg.     Pericardium  Trivial pericardial effusion.     Rhythm  Sinus rhythm was noted.  ______________________________________________________________________________  MMode/2D Measurements & Calculations  IVSd: 0.99 cm     LVIDd: 5.3 cm  LVIDs: 3.5 cm  LVPWd: 0.99  cm  FS: 34.8 %  LV mass(C)d: 199.5 grams  LV mass(C)dI: 110.5 grams/m2  Ao root diam: 3.5 cm  LA dimension: 4.4 cm  LA/Ao: 1.3  LA Volume (BP): 57.7 ml  LA Volume Index (BP): 31.9 ml/m2  RWT: 0.37  TAPSE: 2.3 cm     Doppler Measurements & Calculations  MV E max prosper: 77.1 cm/sec  MV A max prosper: 108.2 cm/sec  MV E/A: 0.71  MV dec slope: 260.8 cm/sec2  MV dec time: 0.30 sec  E/E' av.6  Lateral E/e': 11.0  Medial E/e': 14.2     ______________________________________________________________________________  Report approved by: Bella Parada 2023 05:12 PM

## 2023-01-09 NOTE — CONSULTS
[facility] Nurse Inpatient Assessment     Consulted for: RLE wounds    Abdomen no injury, but deep crease will write interdry order for prevention  Patient History (according to provider note(s):    89-year-old female with history of breast cancer, hypertension, hyperlipidemia, GERD, came to the ED with multiple problems including cold and cough for the last couple of weeks, not getting better, nasal drainage, uncontrolled blood pressure and developing swelling and redness on the lower extremities.    Right lower extremity cellulitis with venous stasis ulcer    Areas Assessed:      Areas visualized during today's visit: Lower extremities     Wound location: RLE    Last photo: 1/9/23    Wound due to: Skin Tear and Venous Ulcer  Wound history/plan of care: Patient reports these wounds started when she was taking tape off her skin.   Wound base: 3 areas open. Superior wound 3cm x 2cm superficial scabbing. Anterior wound 1.5cm x 1.5cm superficial scab. Posterior wound 3cm x 1.5cm x 0.1cm dermis mixed with yellow fibrinous tissue     Palpation of the wound bed: normal      Drainage: none     Description of drainage: none     Measurements (length x width x depth, in cm): see above     Tunneling: N/A     Undermining: N/A  Periwound skin: Ecchymosis and Edematous      Color: pink and purple      Temperature: normal   Odor: none  Pain: absent and denies , none  Pain interventions prior to dressing change: patient tolerated well  Treatment goal: Heal  and Infection control/prevention  STATUS: initial assessment  Supplies ordered: ordered polymem foam and plurogel and discussed with RN      Treatment Plan:     RLE wound(s): Daily    1. Cleanse with Vashe (134872) soaked gauze on wounds for 10 minutes.   2. Apply no sting barrier film to periwound skin.  3. Apply plurogel (990110) to wound bed.  4. Cover with Polymem foam (#245523). Secure with lymphedema products.     Pannus:  Daily:  Interdry(order#[id number]):   1.  Wash skin gently. Pat, do not rub.  2.  With clean scissors, cut enough fabric to cover the affected area, allowing for a minimum of 2 inches to extend beyond the skin fold for moisture evaporation.  3.  Lay a single layer of fabric in the skin fold, placing one edge into the base of the fold. Gently smooth the rest of the fabric over the skin, keeping it flat.  4.  Leave at least 2 inches of fabric exposed outside the skin fold.  5.  Secure the fabric in one of several ways: with the skin fold, with a small amount of skin-friendly tape, or tucked under clothing.  6.  Remove the fabric before bathing and reuse it when finished. When removing, gently separate the skin fold and lift away.  Helpful Hints  1. InterDry  can be written on with a ballpoint pen. It may be helpful to label each piece of InterDry  with the date you started using it.  2.  Each piece of InterDry  may be used up to 5 days, depending on fabric soiling, odor, amount of moisture and general skin condition. Replace InterDry  if it becomes soiled with blood, urine or stool.  3.  Do not use creams, ointments, or powders with InterDry  as it may interfere with product efficacy.    Orders: Written    RECOMMEND PRIMARY TEAM ORDER: None, at this time  Education provided: plan of care  Discussed plan of care with: Patient and Nurse  WOC nurse follow-up plan: weekly  Notify WOC if wound(s) deteriorate.  Nursing to notify the Provider(s) and re-consult the WOC Nurse if new skin concern.    DATA:     Current support surface: Standard  Standard gel/foam mattress (IsoFlex, Atmos air, etc)  Containment of urine/stool: Incontinence Protocol  BMI: Body mass index is 36.3 kg/m .   Active diet order: Orders Placed This Encounter      Combination Diet Regular Diet Adult; 2 gm NA Diet     Output: I/O last 3 completed shifts:  In: 346 [P.O.:340; I.V.:6]  Out: 450 [Urine:450]     Labs: Recent Labs   Lab 01/09/23  0628 01/08/23  0549    ALBUMIN  --  3.3*   HGB 12.7 12.3   WBC 9.1 7.1     Pressure injury risk assessment:   Sensory Perception: 3-->slightly limited  Moisture: 4-->rarely moist  Activity: 3-->walks occasionally  Mobility: 3-->slightly limited  Nutrition: 3-->adequate  Friction and Shear: 2-->potential problem  Kevin Score: 18    Maine Cortez CWOCN   Dept. Vocera- Contact [facility] Nurse (Isaiah) via  Vocera   Dept. Office Number: [phone]

## 2023-01-09 NOTE — PROGRESS NOTES
[facility] Cardiology Progress Note  Date of Service: 01/09/2023  Primary Cardiologist: Will be Dr. Covington    Deborahsarah Higginbotham is a very young-appearing 89 year old female with history of breast cancer, hypertension, hyperlipidemia, GERD,  admitted on 1/7/2023 with two weeks of URI symptoms c/w bronchitis and uncontrolled hypertension. Developed hypertensive urgency and upper chest discomfort after inhaler use here, with a slight troponin rise ~100.    At present, she feels well, and denies any cardiac symptoms. Ambulation generally limited by knee pain. BP well-controlled. Prelim TTE benign. +BCxs for step epi ?from RLE cellulitis - ID consult pending.    Plan:   1. Will order Lexiscan nuclear study for tomorrow. Would only recommend angiogram with high risk findings. This would be performed as an outpatient, once infection resolved.  2. Continue aspirin, Coreg, amlodipine, pravastatin.    Plan was formulated under direction of Dr. Meraz.   Denise Almaraz PA-C  [facility]  Pager: [phone]  Text Page  (7:30am - 4pm M-F)   __________________________________________________________________________  Physical Exam   Temp: 98.7  F (37.1  C) Temp src: Oral BP: 135/77 Pulse: 70   Resp: 16 SpO2: 96 % O2 Device: None (Room air)    Vitals:    01/08/23 0500 01/08/23 1244 01/09/23 0639   Weight: 85.3 kg (188 lb 0.8 oz) 84.5 kg (186 lb 3.2 oz) 84.3 kg (185 lb 13.6 oz)       GENERAL:  The patient is in no apparent distress.   NECK: CVP appears normal, no masses or thyromegaly.  PULMONARY:  There is a normal respiratory effort. Clear lungs to auscultation bilaterally.   CARDIOVASCULAR:  RRR, normal S1 S2, no m/r/g.  GI:  Non tender abdomen with normoactive bowel sounds and no hepatosplenomegaly. There are no masses palpable.   EXTREMITIES:  RLE wound covered. Non-pitting edema bilateral pretibial region.  VASCULAR: 2+ Pulses bilaterally in upper and lower  extremities.    Medications     heparin 850 Units/hr (01/09/23 0739)       amLODIPine  5 mg Oral Daily     aspirin  81 mg Oral Daily     azithromycin  250 mg Oral Daily     carvedilol  25 mg Oral BID w/meals     ceFAZolin  2 g Intravenous Q8H     fluticasone  1 spray Both Nostrils Daily     furosemide  20 mg Intravenous Q8H     levalbuterol  1.25 mg Nebulization 4x daily     loratadine  10 mg Oral Daily     pantoprazole  40 mg Oral QAM AC     potassium chloride ER  10 mEq Oral BID     pravastatin  40 mg Oral At Bedtime     predniSONE  40 mg Oral Daily     sodium chloride (PF)  3 mL Intracatheter Q8H     vitamin D3  2,000 Units Oral Daily       Data   Most Recent 3 CBC's:Recent Labs   Lab Test 01/09/23  0628 01/08/23  0547 01/07/23  2101   WBC 9.1 7.1 8.8   HGB 12.7 12.3 13.3   MCV 92 90 89    230 256     Most Recent 3 BMP's:Recent Labs   Lab Test 01/09/23  0523 01/08/23  1548 01/08/23  0547 01/07/23  2101     --  139 139   POTASSIUM 3.8 4.0 3.1* 3.6   CHLORIDE 107  --  105 104   CO2 21  --  23 23   BUN 23  --  16 16   CR 0.99  --  0.85 0.81   ANIONGAP 9  --  11 12   ARABELLA 9.1  --  8.9 9.2   *  --  144* 116*     Most Recent 3 BNP's:Recent Labs   Lab Test 01/07/23  1241 10/04/18  1550   NTBNPI 1,013 349     Most Recent D-dimer:No lab results found.  Most Recent Cholesterol Panel:No lab results found.

## 2023-01-09 NOTE — PROGRESS NOTES
Orientations: AOx4 hard of hearing  Vitals/Pain:  VSS except hypertensive ( give hydralazine for SBP >180 )  Otherwise on RA. Pt denies any chest pain. CMS intact. Denies any nausea and pain this shift.   Tele: NSR   Lines/Drains: 2 PIV. 1 PIV heparin running @700 units/ hr , next recheck 0018.   Skin/Wounds: BLE swelling. RLE open ulcers, edematous and weeping.  GI/:  Tolerating 2 gm NA diet. Walks to bedside commode, Good UOP. No BM this shift, + flatus,   Labs: Q4 troponin checks, K+ replaced this shift, WNL rechecks @am.   Ambulation/Assist: Assist GB/ walker  Plan: Wound consult placed. Nuclear stress test tomorrow.

## 2023-01-09 NOTE — PROGRESS NOTES
"   01/09/23 1400   Appointment Info   Signing Clinician's Name / Credentials (OT) Nuria Thao, OTR/L   Quick Adds   Quick Adds Edema   Living Environment   People in Home alone   Current Living Arrangements assisted living   Living Environment Comments Pt reports she lives in an ABILIO but does not utilize the nursing care services. Walk in shower w/ small threshold.   Self-Care   Usual Activity Tolerance good   Current Activity Tolerance moderate   Regular Exercise   (reports she just started home PT 1x/week)   Equipment Currently Used at Home walker, rolling  (4WW)   Fall history within last six months yes   Number of times patient has fallen within last six months 1   Activity/Exercise/Self-Care Comment Pt was IND w/ ADLs and self cares prior to admission.   Instrumental Activities of Daily Living (IADL)   IADL Comments Evening meal provided 6x/week, sundays she cooks her own lunch and breakfast meals. Pt has not driven since she moved into the ABILIO. Pt uses a pill box and sets it up for 2 weeks at a time   General Information   Onset of Illness/Injury or Date of Surgery 01/07/23   Referring Physician Berto Muir MD   Patient/Family Therapy Goal Statement (OT) did not state   Additional Occupational Profile Info/Pertinent History of Current Problem Per chart: \"Deborah Higginbotham is a very young-appearing 89 year old female with history of breast cancer, hypertension, hyperlipidemia, GERD,  admitted on 1/7/2023 with two weeks of URI symptoms c/w bronchitis and uncontrolled hypertension. Developed hypertensive urgency and upper chest discomfort after inhaler use here, with a slight troponin rise ~100.   Existing Precautions/Restrictions fall   Cognitive Status Examination   Orientation Status orientation to person, place and time   Visual Perception   Impact of Vision Impairment on Function (Vision) corrective lenses   Edema General Information   Onset of Edema 01/07/23   Affected Body Part(s) Left LE;Right LE "   Edema Etiology   (has had breast cancer surgeries)   Edema Precautions Acute infection   General Comments/Previous Edema Treatment/Edema Equipment Cellulitis   Edema Examination/Assessment   Skin Condition Pitting;Venous distention;Hemosiderin deposits   Scar Yes   Ulcerations Yes  (R shin, WOC following)   Radial Pulse Symmetrical   Stemmer Sign Positive   Pitting Assessment 3+ dorsum of B feet up to ankles and calf region   Edema Assessment Comments tournequet effect from socks and stockinett that was covering wound on R shin   Range of Motion Comprehensive   Comment, General Range of Motion UE WFL   Strength Comprehensive (MMT)   Comment, General Manual Muscle Testing (MMT) Assessment generalized weakness   Bed Mobility   Comment (Bed Mobility) did not observe, pt up in chair at time of assessment   Transfers   Transfer Comments Ax1 STS to FWW, not ambulating far per patient report   Balance   Balance Comments defer to PT for further assessment   Clinical Impression   Criteria for Skilled Therapeutic Interventions Met (OT) Yes, treatment indicated   OT Diagnosis impaired ADL/IADL IND, edema   Edema: Patient Presentation Stage 2 Lymphedema   OT Problem List-Impairments impacting ADL problems related to;activity tolerance impaired;balance;mobility;strength   Assessment of Occupational Performance 3-5 Performance Deficits   Identified Performance Deficits home mgmt, balance, mobility, activity tolerance   Planned Therapy Interventions (OT) ADL retraining;IADL retraining;manual therapy;strengthening;home program guidelines;progressive activity/exercise;risk factor education   Edema: Planned Interventions Manual therapy;Education;Edema exercises;Gradient compression bandaging;ADL training   Clinical Decision Making Complexity (OT) low complexity   Risk & Benefits of therapy have been explained evaluation/treatment results reviewed;care plan/treatment goals reviewed;risks/benefits reviewed;current/potential barriers  reviewed;participants voiced agreement with care plan;participants included;patient   OT Total Evaluation Time   OT Eval, Low Complexity Minutes (80386) 12   OT Goals   Therapy Frequency (OT) Daily   OT Predicted Duration/Target Date for Goal Attainment 01/13/23   OT Goals Hygiene/Grooming;Upper Body Dressing;Lower Body Dressing;Toilet Transfer/Toileting;Edema   OT: Hygiene/Grooming modified independent;while standing   OT: Upper Body Dressing Independent   OT: Lower Body Dressing Minimal assist;Modified independent   OT: Toilet Transfer/Toileting Modified independent   OT: Edema education to increase ability to manage edema after discharge from the hospital Patient;Skin care routine;signs/symptoms of intolerance;wear schedule;garrnet/bandage care;limb positioning;discharge recommendations   OT: Management of edema bandages Patient;Verbalize;Demonstrate;quick wrap;garment(s)   OT: Functional edema exercise program to reduce limb volume, increase activity tolerance and improve independence with ADL Patient;Demonstrates;[place];HEP;walking program   Interventions   Interventions Quick Adds Self-Care/Home Management;Therapeutic Activity;Therapeutic Procedures/Exercise;Manual Therapy   Self-Care/Home Management   Self-Care/Home Mgmt/ADL, Compensatory, Meal Prep Minutes (31143) 8   Treatment Detail/Skilled Intervention OT: Pt able to reach tops of socks but demonstrates difficulty and required Mod A to doff sock, Max A to don socks. Pt reports she has been unable to manage previous stockinets provided for edema wear. Education provided on energy conservation strategies. Handouts issued for LE edema. BLE elevated onto folding chair as pts bedside chair does not have foot rest.   Manual Therapy   Manual Therapy: Mobilization, MFR, MLD, friction massage minutes (57892) 30   Symptoms noted during/after treatment none   Treatment Detail/Skilled Intervention OT: Lymphedema evaluation complete and treatment initiated.  Pt  demonstrates stage 2 lymphedema in B LE below knee.  Pt is appropriate for LE quick wrap, using short stretch (SS) bandages, not ACE wraps. Pt educated in rationale for compression with wound healing and importance of using SS bandages which have low resting pressure and high working pressure, vs ACE wraps which have high resting pressure and low working pressure. LEs washed, lotion applied, and quick wraps completed to B LE with 8 cm short stretch bandage toes to ankle and 10 cm short stretch bandage ankle to knee with appropriate stretch and spacing to allow gradient compression.  Wraps applied bilaterally. Coordinated with nursing regarding wearing schedule, and completing LE cares for the following day prior to therapy arrival (remove by 12, therapist to return by 130). Updated white board with schedule and quick wrap instruction sheet if wraps become loose or are otherwise not tolerated. If pt does not tolerate wraps well, please remove wraps but keep LEs elevated. Pt provided with ABD padding on lateral aspect of calf/shin to toes on LLE d/t weeping. Bandage on RLE provided by [facility].   Therapeutic Procedures/Exercise   Therapeutic Procedure: strength, endurance, ROM, flexibillity minutes (73756) 10   Symptoms Noted During/After Treatment fatigue;increased pain   Treatment Detail/Skilled Intervention OT: Pt engaged in BLE edema exercises, provided handout with full HEP and pt engaged in x10 reps of ankle pumps, SLR and knee flexion/extension exercises.   OT Discharge Planning   OT Plan check tolerance for LE wraps, bed mobility, review HEP, LB dsg, standing g/h   OT Discharge Recommendation (DC Rec) Transitional Care Facility;home with home care occupational therapy   OT Rationale for DC Rec Pt functioning below baseline, at this time pt would benefit from TCU prior to returning to home however expect with medical stability pt may benefit from HHOT evaluation for safety and continue to follow for lymphedema  therapy.   OT Brief overview of current status did not observe OOB activity at this time d/t extensive lymph drainage and time spent wrapping BLE   Total Session Time   Timed Code Treatment Minutes 48   Total Session Time (sum of timed and untimed services) 60

## 2023-01-09 NOTE — CONSULTS
[facility]    Infectious Disease Consultation     Date of Admission:  1/7/2023  Date of Consult (When I saw the patient): 01/09/23    Assessment & Plan   Deborah Higginbotham is a 89 year old female who was admitted on 1/7/2023.     Impression:  1. 89 y.o female presenting with URI symptoms   2. chect pain, cough   3. Bilateral pedal edema.   4. 1/2 blood cultures in both sets positive for staph epi  5. No reports of intravascular devices.   6. Received a dose of vancomycin also on cefazolin and azithromycin for CAP  7. Right LE wound not particularly infected looking.     Recommendations:   No intravascular devices nothing to suggest staph epi bacteremia is real stop vancomycin, repeat a set of blood cultures   Treat the wounds on the RLE with local wound care no objection to a few days of keflex       Jake Ching MD    Reason for Consult   Reason for consult: I was asked to evaluate this patient for staph epi bacteremia.    Primary Care Physician   Stu Stanford    Chief Complaint   Cough   Chest pain     History is obtained from the patient and medical records    History of Present Illness   Deborah Higginbotham is a 89 year old female with history of breast cancer, hypertension, hyperlipidemia, GERD, presented with URI symptoms and a cough not getting better, bilateral LE edema  s    Past Medical History   I have reviewed this patient's medical history and updated it with pertinent information if needed.   Past Medical History:   Diagnosis Date     Cancer (H)     breast     High cholesterol      Hypertension        Past Surgical History   I have reviewed this patient's surgical history and updated it with pertinent information if needed.  Past Surgical History:   Procedure Laterality Date     BREAST SURGERY       HERNIA REPAIR       ORTHOPEDIC SURGERY         Prior to Admission Medications   Prior to Admission Medications   Prescriptions Last Dose Informant Patient Reported? Taking?    PRAVASTATIN SODIUM PO 1/6/2023 at pm Pharmacy, Self Yes Yes   Sig: Take 40 mg by mouth At Bedtime    VITAMIN D, CHOLECALCIFEROL, PO 1/7/2023 at am Self Yes Yes   Sig: Take 2,000 Units by mouth daily    cloNIDine (CATAPRES) 0.2 MG tablet prn Self Yes Yes   Sig: Take 0.2 mg by mouth 2 times daily as needed (for SBP>165)   furosemide (LASIX) 20 MG tablet 1/7/2023 at am Self Yes Yes   Sig: Take 40 mg by mouth daily   magnesium oxide (MAG-OX) 400 MG tablet 1/7/2023 at am Self Yes Yes   Sig: Take 400 mg by mouth daily   meloxicam (MOBIC) 15 MG tablet 1/6/2023 Self Yes Yes   Sig: Take 15 mg by mouth daily   metoprolol tartrate (LOPRESSOR) 25 MG tablet 1/7/2023 at am Self Yes Yes   Sig: Take 25 mg by mouth 2 times daily   pantoprazole (PROTONIX) 40 MG EC tablet Not Taking Self No No   Sig: Take 1 tablet (40 mg) by mouth every morning (before breakfast)   Patient not taking: Reported on 1/7/2023   potassium chloride (MICRO-K) 10 MEQ CR capsule 1/7/2023 at am Pharmacy, Self Yes Yes   Sig: Take 10 mEq by mouth 2 times daily Morning & Noon.   psyllium (METAMUCIL/KONSYL) Packet prn Self Yes Yes   Sig: Take 1 packet by mouth daily as needed for constipation      Facility-Administered Medications: None     Allergies   No Known Allergies    Immunization History   Immunization History   Administered Date(s) Administered     COVID-19 Vaccine 18+ (Moderna) 01/28/2021, 02/25/2021, 11/18/2021, 05/03/2022     COVID-19 Vaccine Bivalent Booster 12+ (Pfizer) 11/01/2022     Influenza Vaccine 65+ (Fluzone HD) 11/01/2022       Social History   I have reviewed this patient's social history and updated it with pertinent information if needed. Deborah Higginbotham  reports that she has quit smoking. She has never used smokeless tobacco. She reports that she does not use drugs.    Family History   I have reviewed this patient's family history and updated it with pertinent information if needed.   Family History   Problem Relation Age of Onset      Coronary Artery Disease Father      Bone Cancer Father      Breast Cancer Sister        Review of Systems   The 10 point Review of Systems is negative     Physical Exam   Temp: 98.7  F (37.1  C) Temp src: Oral BP: 135/77 Pulse: 70   Resp: 16 SpO2: 96 % O2 Device: None (Room air)    Vital Signs with Ranges  Temp:  [97.4  F (36.3  C)-98.7  F (37.1  C)] 98.7  F (37.1  C)  Pulse:  [70-98] 70  Resp:  [16-28] 16  BP: (118-144)/(64-81) 135/77  SpO2:  [93 %-96 %] 96 %  185 lbs 13.56 oz  Body mass index is 36.3 kg/m .    GENERAL APPEARANCE:  awake  EYES: Eyes grossly normal to inspection  NECK: no adenopathy  RESP: lungs clear   CV: regular rates and rhythm  LYMPHATICS: normal ant/post cervical and supraclavicular nodes  ABDOMEN: soft, nontender  MS: RLE lateral area with wounds   SKIN: no suspicious lesions or rashes        Data   Lab Results   Component Value Date    WBC 9.1 01/09/2023    HGB 12.7 01/09/2023    HCT 38.2 01/09/2023     01/09/2023     01/09/2023    POTASSIUM 3.8 01/09/2023    CHLORIDE 107 01/09/2023    CO2 21 01/09/2023    BUN 23 01/09/2023    CR 0.99 01/09/2023     (H) 01/09/2023    NTBNPI 1,013 01/07/2023    TROPI <0.015 10/04/2018    AST 14 01/07/2023    ALT 15 01/07/2023    ALKPHOS 88 01/07/2023    BILITOTAL 0.5 01/07/2023     No results for input(s): CULT in the last 168 hours.  No lab results found.    Invalid input(s): UC       All cultures:  Recent Labs   Lab 01/07/23  1555 01/07/23  1512   CULTURE Positive on the 1st day of incubation*  Staphylococcus epidermidis* Positive on the 1st day of incubation*  Gram positive cocci in clusters*      Blood culture:  Results for orders placed or performed during the hospital encounter of 01/07/23   Blood Culture Peripheral Blood    Specimen: Peripheral Blood   Result Value Ref Range    Culture Positive on the 1st day of incubation (A)     Culture Gram positive cocci in clusters (AA)    Blood Culture Peripheral Blood    Specimen:  Peripheral Blood   Result Value Ref Range    Culture Positive on the 1st day of incubation (A)     Culture Staphylococcus epidermidis (AA)    Results for orders placed or performed during the hospital encounter of 07/22/21   Blood Culture Arm, Right    Specimen: Arm, Right; Blood   Result Value Ref Range    Culture No Growth    Blood Culture Arm, Left    Specimen: Arm, Left; Blood   Result Value Ref Range    Culture No Growth       Urine culture:  No results found for this or any previous visit.

## 2023-01-10 ENCOUNTER — APPOINTMENT (OUTPATIENT)
Dept: NUCLEAR MEDICINE | Facility: CLINIC | Age: 88
DRG: 602 | End: 2023-01-10
Attending: PHYSICIAN ASSISTANT
Payer: COMMERCIAL

## 2023-01-10 ENCOUNTER — APPOINTMENT (OUTPATIENT)
Dept: OCCUPATIONAL THERAPY | Facility: CLINIC | Age: 88
DRG: 602 | End: 2023-01-10
Payer: COMMERCIAL

## 2023-01-10 LAB
ANION GAP SERPL CALCULATED.3IONS-SCNC: 9 MMOL/L (ref 3–14)
ATRIAL RATE - MUSE: 85 BPM
BACTERIA BLD CULT: ABNORMAL
BACTERIA BLD CULT: ABNORMAL
BUN SERPL-MCNC: 25 MG/DL (ref 7–30)
CALCIUM SERPL-MCNC: 8.6 MG/DL (ref 8.5–10.1)
CHLORIDE BLD-SCNC: 105 MMOL/L (ref 94–109)
CO2 SERPL-SCNC: 24 MMOL/L (ref 20–32)
CREAT SERPL-MCNC: 0.98 MG/DL (ref 0.52–1.04)
CV STRESS MAX HR HE: 71
DIASTOLIC BLOOD PRESSURE - MUSE: NORMAL MMHG
ERYTHROCYTE [DISTWIDTH] IN BLOOD BY AUTOMATED COUNT: 13.4 % (ref 10–15)
GFR SERPL CREATININE-BSD FRML MDRD: 54 ML/MIN/1.73M2
GLUCOSE BLD-MCNC: 110 MG/DL (ref 70–99)
HCT VFR BLD AUTO: 35.7 % (ref 35–47)
HGB BLD-MCNC: 11.8 G/DL (ref 11.7–15.7)
INTERPRETATION ECG - MUSE: NORMAL
MAGNESIUM SERPL-MCNC: 2.4 MG/DL (ref 1.6–2.3)
MCH RBC QN AUTO: 29.8 PG (ref 26.5–33)
MCHC RBC AUTO-ENTMCNC: 33.1 G/DL (ref 31.5–36.5)
MCV RBC AUTO: 90 FL (ref 78–100)
P AXIS - MUSE: 35 DEGREES
PLATELET # BLD AUTO: 248 10E3/UL (ref 150–450)
POTASSIUM BLD-SCNC: 3.6 MMOL/L (ref 3.4–5.3)
PR INTERVAL - MUSE: 152 MS
QRS DURATION - MUSE: 80 MS
QT - MUSE: 370 MS
QTC - MUSE: 440 MS
R AXIS - MUSE: -55 DEGREES
RATE PRESSURE PRODUCT: 8165
RBC # BLD AUTO: 3.96 10E6/UL (ref 3.8–5.2)
SODIUM SERPL-SCNC: 138 MMOL/L (ref 133–144)
STRESS ECHO BASELINE DIASTOLIC HE: 88
STRESS ECHO BASELINE HR: 53 BPM
STRESS ECHO BASELINE SYSTOLIC BP: 142
STRESS ECHO CALCULATED PERCENT HR: 55 %
STRESS ECHO LAST STRESS DIASTOLIC BP: 68
STRESS ECHO LAST STRESS SYSTOLIC BP: 115
STRESS ECHO TARGET HR: 129
SYSTOLIC BLOOD PRESSURE - MUSE: NORMAL MMHG
T AXIS - MUSE: 26 DEGREES
UFH PPP CHRO-ACNC: 0.5 IU/ML
VENTRICULAR RATE- MUSE: 85 BPM
WBC # BLD AUTO: 9 10E3/UL (ref 4–11)

## 2023-01-10 PROCEDURE — 250N000009 HC RX 250: Performed by: HOSPITALIST

## 2023-01-10 PROCEDURE — 93016 CV STRESS TEST SUPVJ ONLY: CPT | Performed by: INTERNAL MEDICINE

## 2023-01-10 PROCEDURE — 83735 ASSAY OF MAGNESIUM: CPT | Performed by: HOSPITALIST

## 2023-01-10 PROCEDURE — 120N000001 HC R&B MED SURG/OB

## 2023-01-10 PROCEDURE — A9502 TC99M TETROFOSMIN: HCPCS | Performed by: INTERNAL MEDICINE

## 2023-01-10 PROCEDURE — 250N000013 HC RX MED GY IP 250 OP 250 PS 637: Performed by: INTERNAL MEDICINE

## 2023-01-10 PROCEDURE — 343N000001 HC RX 343: Performed by: INTERNAL MEDICINE

## 2023-01-10 PROCEDURE — 94640 AIRWAY INHALATION TREATMENT: CPT | Mod: 76

## 2023-01-10 PROCEDURE — 94640 AIRWAY INHALATION TREATMENT: CPT

## 2023-01-10 PROCEDURE — 999N000049 HC STATISTIC ECHO STRESS OR NM NPI

## 2023-01-10 PROCEDURE — 999N000157 HC STATISTIC RCP TIME EA 10 MIN

## 2023-01-10 PROCEDURE — 85520 HEPARIN ASSAY: CPT | Performed by: INTERNAL MEDICINE

## 2023-01-10 PROCEDURE — 85027 COMPLETE CBC AUTOMATED: CPT | Performed by: HOSPITALIST

## 2023-01-10 PROCEDURE — 97110 THERAPEUTIC EXERCISES: CPT | Mod: GO | Performed by: OCCUPATIONAL THERAPIST

## 2023-01-10 PROCEDURE — 97535 SELF CARE MNGMENT TRAINING: CPT | Mod: GO | Performed by: OCCUPATIONAL THERAPIST

## 2023-01-10 PROCEDURE — 36415 COLL VENOUS BLD VENIPUNCTURE: CPT | Performed by: HOSPITALIST

## 2023-01-10 PROCEDURE — 99232 SBSQ HOSP IP/OBS MODERATE 35: CPT | Performed by: HOSPITALIST

## 2023-01-10 PROCEDURE — 250N000013 HC RX MED GY IP 250 OP 250 PS 637: Performed by: HOSPITALIST

## 2023-01-10 PROCEDURE — 78452 HT MUSCLE IMAGE SPECT MULT: CPT | Mod: 26 | Performed by: INTERNAL MEDICINE

## 2023-01-10 PROCEDURE — 97140 MANUAL THERAPY 1/> REGIONS: CPT | Mod: GO | Performed by: OCCUPATIONAL THERAPIST

## 2023-01-10 PROCEDURE — 80048 BASIC METABOLIC PNL TOTAL CA: CPT | Performed by: HOSPITALIST

## 2023-01-10 PROCEDURE — 250N000012 HC RX MED GY IP 250 OP 636 PS 637: Performed by: INTERNAL MEDICINE

## 2023-01-10 PROCEDURE — 93018 CV STRESS TEST I&R ONLY: CPT | Performed by: INTERNAL MEDICINE

## 2023-01-10 PROCEDURE — 93017 CV STRESS TEST TRACING ONLY: CPT

## 2023-01-10 PROCEDURE — 250N000011 HC RX IP 250 OP 636: Performed by: INTERNAL MEDICINE

## 2023-01-10 PROCEDURE — 78452 HT MUSCLE IMAGE SPECT MULT: CPT

## 2023-01-10 PROCEDURE — 99232 SBSQ HOSP IP/OBS MODERATE 35: CPT | Performed by: INTERNAL MEDICINE

## 2023-01-10 RX ORDER — ACYCLOVIR 200 MG/1
0-1 CAPSULE ORAL
Status: DISCONTINUED | OUTPATIENT
Start: 2023-01-10 | End: 2023-01-10

## 2023-01-10 RX ORDER — REGADENOSON 0.08 MG/ML
0.4 INJECTION, SOLUTION INTRAVENOUS ONCE
Status: COMPLETED | OUTPATIENT
Start: 2023-01-10 | End: 2023-01-10

## 2023-01-10 RX ORDER — ALBUTEROL SULFATE 90 UG/1
2 AEROSOL, METERED RESPIRATORY (INHALATION) EVERY 5 MIN PRN
Status: DISCONTINUED | OUTPATIENT
Start: 2023-01-10 | End: 2023-01-10

## 2023-01-10 RX ORDER — AMINOPHYLLINE 25 MG/ML
50-100 INJECTION, SOLUTION INTRAVENOUS
Status: DISCONTINUED | OUTPATIENT
Start: 2023-01-10 | End: 2023-01-10

## 2023-01-10 RX ORDER — CAFFEINE CITRATE 20 MG/ML
60 SOLUTION INTRAVENOUS
Status: DISCONTINUED | OUTPATIENT
Start: 2023-01-10 | End: 2023-01-10

## 2023-01-10 RX ADMIN — GUAIFENESIN 600 MG: 600 TABLET, EXTENDED RELEASE ORAL at 08:36

## 2023-01-10 RX ADMIN — CEFAZOLIN SODIUM 2 G: 2 INJECTION, SOLUTION INTRAVENOUS at 04:03

## 2023-01-10 RX ADMIN — IPRATROPIUM BROMIDE AND ALBUTEROL SULFATE 3 ML: .5; 3 SOLUTION RESPIRATORY (INHALATION) at 15:24

## 2023-01-10 RX ADMIN — ASPIRIN 81 MG: 81 TABLET, COATED ORAL at 08:37

## 2023-01-10 RX ADMIN — PANTOPRAZOLE SODIUM 40 MG: 40 TABLET, DELAYED RELEASE ORAL at 06:32

## 2023-01-10 RX ADMIN — IPRATROPIUM BROMIDE AND ALBUTEROL SULFATE 3 ML: .5; 3 SOLUTION RESPIRATORY (INHALATION) at 07:53

## 2023-01-10 RX ADMIN — Medication 2000 UNITS: at 08:37

## 2023-01-10 RX ADMIN — TETROFOSMIN 32.2 MCI.: 1.38 INJECTION, POWDER, LYOPHILIZED, FOR SOLUTION INTRAVENOUS at 10:10

## 2023-01-10 RX ADMIN — GUAIFENESIN AND CODEINE PHOSPHATE 10 ML: 100; 10 SOLUTION ORAL at 13:33

## 2023-01-10 RX ADMIN — CARVEDILOL 25 MG: 25 TABLET, FILM COATED ORAL at 08:37

## 2023-01-10 RX ADMIN — AZITHROMYCIN MONOHYDRATE 250 MG: 250 TABLET ORAL at 08:36

## 2023-01-10 RX ADMIN — CEFAZOLIN SODIUM 2 G: 2 INJECTION, SOLUTION INTRAVENOUS at 13:36

## 2023-01-10 RX ADMIN — AMLODIPINE BESYLATE 5 MG: 5 TABLET ORAL at 08:36

## 2023-01-10 RX ADMIN — POTASSIUM CHLORIDE 10 MEQ: 750 TABLET, EXTENDED RELEASE ORAL at 20:07

## 2023-01-10 RX ADMIN — GUAIFENESIN 600 MG: 600 TABLET, EXTENDED RELEASE ORAL at 20:07

## 2023-01-10 RX ADMIN — IPRATROPIUM BROMIDE AND ALBUTEROL SULFATE 3 ML: .5; 3 SOLUTION RESPIRATORY (INHALATION) at 19:32

## 2023-01-10 RX ADMIN — TETROFOSMIN 10.3 MCI.: 1.38 INJECTION, POWDER, LYOPHILIZED, FOR SOLUTION INTRAVENOUS at 08:06

## 2023-01-10 RX ADMIN — PREDNISONE 40 MG: 20 TABLET ORAL at 08:37

## 2023-01-10 RX ADMIN — REGADENOSON 0.4 MG: 0.08 INJECTION, SOLUTION INTRAVENOUS at 10:06

## 2023-01-10 RX ADMIN — HEPARIN SODIUM 850 UNITS/HR: 10000 INJECTION, SOLUTION INTRAVENOUS at 13:43

## 2023-01-10 RX ADMIN — CEFAZOLIN SODIUM 2 G: 2 INJECTION, SOLUTION INTRAVENOUS at 20:08

## 2023-01-10 RX ADMIN — POTASSIUM CHLORIDE 10 MEQ: 750 TABLET, EXTENDED RELEASE ORAL at 08:36

## 2023-01-10 RX ADMIN — BENZONATATE 100 MG: 100 CAPSULE ORAL at 20:07

## 2023-01-10 RX ADMIN — CARVEDILOL 25 MG: 25 TABLET, FILM COATED ORAL at 17:49

## 2023-01-10 RX ADMIN — LORATADINE 10 MG: 10 TABLET ORAL at 08:36

## 2023-01-10 RX ADMIN — FLUTICASONE PROPIONATE 1 SPRAY: 50 SPRAY, METERED NASAL at 08:44

## 2023-01-10 RX ADMIN — PRAVASTATIN SODIUM 40 MG: 20 TABLET ORAL at 20:07

## 2023-01-10 ASSESSMENT — ACTIVITIES OF DAILY LIVING (ADL)
ADLS_ACUITY_SCORE: 39
ADLS_ACUITY_SCORE: 41
ADLS_ACUITY_SCORE: 41
ADLS_ACUITY_SCORE: 37
ADLS_ACUITY_SCORE: 37
ADLS_ACUITY_SCORE: 41
ADLS_ACUITY_SCORE: 37
ADLS_ACUITY_SCORE: 41
ADLS_ACUITY_SCORE: 39
ADLS_ACUITY_SCORE: 41
ADLS_ACUITY_SCORE: 39
ADLS_ACUITY_SCORE: 41

## 2023-01-10 NOTE — PLAN OF CARE
A&Ox4, [place]. VSS on RA. TELE SR. A2 with ambulation, GB and walker, on fall precaution. LS diminished, with dry and weak cough. IS encouraged and used. BS active - voiding freely. R PIV to dorsal forearm on SL. R PIV to anterior forearm patent with IV Heparin infusing at 850units/hr. Denied pain. Continues wound care to RLE and R pannus per plan of care. Lymphedema wraps intact on BLE. Stress test scheduled today, light breakfast, no caffeine - endorsed. Given prn Senna docusate PO, no BM, + flatus. Continues with antibiotics.

## 2023-01-10 NOTE — PROGRESS NOTES
Echocardiography showed normal LV function.  Nuclear study is negative for ischemia.  We will sign off.  Please call us with questions.

## 2023-01-10 NOTE — PROGRESS NOTES
Pre-procedure:    Troponin elevated as of 1/8  IV patent    Post procedure:    Patient tolerated Sheila-scan well.  Denies chest pain and discomfort after test.  Patient will wait in hospital for next step of testing.  Patient transferred back to room.

## 2023-01-10 NOTE — PROGRESS NOTES
Care Management Follow Up    Length of Stay (days): 3    Expected Discharge Date: 01/12/2023     Concerns to be Addressed: discharge planning  Patient wants to transition home, daughter feels her mother needs to transition to a TCU  Patient plan of care discussed at interdisciplinary rounds: Yes    Anticipated Discharge Disposition: Transitional Care     Anticipated Discharge Services: Other (see comment) (rehab)  Anticipated Discharge DME: None    Patient/family educated on Medicare website which has current facility and service quality ratings:    Education Provided on the Discharge Plan:    Patient/Family in Agreement with the Plan:      Referrals Placed by CM/SW:    Private pay costs discussed: Not applicable    Additional Information:  Writer met with patient as recommendations for TCU have been made. Patient is open to the idea and would like referrals sent to [facility]. Writer explained that she has [employer] insurance and those are out of network. Patient understood and wants to send referrals to [facility] as her daughter lives there. [facility] to start. Patient understands that more referrals may have to be made.     SW will continue to follow      JESSIE Mora

## 2023-01-10 NOTE — PROGRESS NOTES
[facility]    Medicine Progress Note - Hospitalist Service    Date of Admission:  1/7/2023    Assessment & Plan   Deborah Higginbotham is a 89 year old female who was admitted on 1/7/2023.    Assessment and plan  This is a 89-year-old female with history of breast cancer, hypertension, hyperlipidemia, GERD, came to the ED with multiple problems including cold and cough for the last couple of weeks, not getting better, nasal drainage, uncontrolled blood pressure and developing swelling and redness on the lower extremities.    Right lower extremity cellulitis with venous stasis ulcer  1/2 Blood cx, staph epi, likely contaminant  Presented with worsening lower extremity swelling. Most likely this is venous stasis and venous stasis ulcer causing her cellulitis on the right leg. Right leg is erythematous, warm and slightly tender with weeping ulcers. Received vancomycin and ceftriaxone in the ED. Bilateral lower extremity US was negative for DVT. Blood cx with staph epi  - continue IV cefazolin 2g q8h while inpatient, discharge with oral keflex to complete 7 day course tx.  - lymphedema appreciated  - WOC appreciated  - ID appreciated  - recommended to TCU, [employer] options are limited, but social work has sent a few    Bilateral lower extremity swelling:    Hypokalemia secondary to diuresis  The cause is uncertain at this time. Given her uncontrolled hypertension, need to rule out any congestive heart failure. Received intermittent IV diuresis with lasix.  * Echo: normal biventricular size and systolic function. EF 60-65%, no WMA.    Recurrent chest pain in the setting of hypertensive emergency and acute bronchitis  NSTEMI, type 2 secondary to demand with hypertensive emergency  Patient complains of intermittent chest pain and also cough is not controlled by Robitussin, tessalon added. Had RRT for chest pain, serial trop 11-.  * 1/10 stress test negative for induced ischemia  - Most likely  chest pain is secondary to hypertensive emergency as well as her significant cough with acute bronchitis  - cardiology appreciated    Hypertensive emergency- resolved  BP up to 226/118. PTA on metoprolol.  - appreciate cardiology input  - coreg 25mg BID, amlodipine 5mg daily added  - Diuresed with lasix 20mg IV q8h, last dose 1/9 evening    Acute bronchitis and postnasal drip:   The patient has had cold symptoms for the last couple of weeks. It is getting slightly better, but still coughing quite a bit, coughing up some phlegm.  Chest x-ray does not show any obvious pneumonia.  On examination, does have some wheezing, likely acute bronchitis  - Started on Z-Shorty on admission  - duoneb QID  - mucinex BID  - Prednisone 40 mg p.o. daily for 5 days    Morbid obesity with BMI of 36.7;  Complicates care     Hyperlipidemia: PTA pravastatin continued  History of gastroesophageal reflux disease:  PTA protonix continued     Diet: Combination Diet Regular Diet Adult; 2 gm NA Diet    DVT Prophylaxis: PCDs  Ferraro Catheter: Not present  Lines: None     Cardiac Monitoring: None  Code Status: No CPR- Do NOT Intubate      Clinically Significant Risk Factors              # Hypoalbuminemia: Lowest albumin = 3.3 g/dL at 1/8/2023  5:47 AM, will monitor as appropriate           # Obesity: Estimated body mass index is 36.73 kg/m  as calculated from the following:    Height as of this encounter: 1.524 m (5').    Weight as of this encounter: 85.3 kg (188 lb 0.8 oz)., PRESENT ON ADMISSION         Disposition Plan      Expected Discharge Date: 01/12/2023,  3:00 PM    Destination: home with help/services;inpatient rehabilitation facility  Discharge Comments: 1/9 echo, nuc med test today, D/C w/ HC          Laurel Benitez  Hospitalist Service  [facility]  Securely message with Perla (more info)  Text page via [employer] Paging/Directory   ______________________________________________________________________    Interval  "History   Patient seen and examined. She is eating breakfast up in chair. Granddaughter at bedside. Discussed plan for TCU and she is agreeable, they have provided some TCU choices to me. Her main goal is being close by to her daughters (haider or luis). Patient is feeling good, she believes the stress test went fine as she had no symptoms during it and \"isn't worried about her heart\". Tolerating antibiotics well. Discussed with SW    Physical Exam   Vital Signs: Temp: 98  F (36.7  C) Temp src: Oral BP: 124/57 Pulse: 75   Resp: 18 SpO2: 91 % O2 Device: None (Room air)    Weight: 188 lbs .84 oz    Constitutional: Awake, alert, cooperative, no apparent distress  Respiratory: Clear to auscultation bilaterally, no crackles or wheezing  Cardiovascular: Regular rate and rhythm, normal S1 and S2, and no murmur noted  GI: Normal bowel sounds, soft, non-distended, non-tender  Skin/Integumen: No rashes, no cyanosis, +1 lower extremity edema with lymphedema wraps in place  Other:     Medical Decision Making       35 MINUTES SPENT BY ME on the date of service doing chart review, history, exam, documentation & further activities per the note.      Data     I have personally reviewed the following data over the past 24 hrs:    9.0  \   11.8   / 248     138 105 25 /  110 (H)   3.6 24 0.98 \       Imaging results reviewed over the past 24 hrs:   Recent Results (from the past 24 hour(s))   NM Lexiscan stress test (nuc card)   Result Value    Target     Baseline Systolic     Baseline Diastolic BP 88    Last Stress Systolic     Last Stress Diastolic BP 68    Baseline HR 53    Max HR  71    Max Predicted HR  55    Rate Pressure Product 8,165.0    Narrative       The nuclear stress test is negative for inducible myocardial ischemia   or infarction.     Left ventricular function is hyperdynamic EF >70%.     There is no prior study for comparison.       "

## 2023-01-10 NOTE — PROGRESS NOTES
[facility]    Infectious Disease Progress Note    Date of Service (when I saw the patient): 01/10/2023     Assessment & Plan   Deborah Higginbotham is a 89 year old female who was admitted on 1/7/2023.     Impression:  1. 89 y.o female presenting with URI symptoms   2. chect pain, cough   3. Bilateral pedal edema.   4. 1/2 blood cultures in both sets positive for staph epi and micrococcus, skin contaminants   5. No reports of intravascular devices.   6. Received a dose of vancomycin also on cefazolin and azithromycin for CAP  7. Right LE wound not particularly infected looking.      Recommendations:   No intravascular devices nothing to suggest staph epi/ micrococcus bacteremia is real stoped vancomycin, follow up on the repeat a set of blood cultures   Treat the wounds on the RLE with local wound care no objection to a few days of keflex            Jake Ching MD    Interval History   Tolerating antibiotics ok   No new rashes or issues with antibiotics   Labs reviewed   No changes to past medical, social or family history   Afebrile   A 10 point ROS was done and is negative other than noted in the interval history above     Physical Exam   Temp: 97.5  F (36.4  C) Temp src: Oral BP: 127/77 Pulse: 71   Resp: 20 SpO2: 96 % O2 Device: None (Room air)    Vitals:    01/08/23 1244 01/09/23 0639 01/10/23 0507   Weight: 84.5 kg (186 lb 3.2 oz) 84.3 kg (185 lb 13.6 oz) 85.3 kg (188 lb 0.8 oz)     Vital Signs with Ranges  Temp:  [97.5  F (36.4  C)-97.9  F (36.6  C)] 97.5  F (36.4  C)  Pulse:  [55-75] 71  Resp:  [16-20] 20  BP: (110-154)/(57-88) 127/77  SpO2:  [92 %-96 %] 96 %    Constitutional: Awake, alert, cooperative, no apparent distress  Lungs: Clear to auscultation bilaterally, no crackles or wheezing  Cardiovascular: Regular rate and rhythm, normal S1 and S2, and no murmur noted  Abdomen: Normal bowel sounds, soft, non-distended, non-tender  Skin: No rashes, no cyanosis, no  edema  Other:    Medications     heparin 850 Units/hr (01/10/23 0849)       amLODIPine  5 mg Oral Daily     aspirin  81 mg Oral Daily     azithromycin  250 mg Oral Daily     carvedilol  25 mg Oral BID w/meals     ceFAZolin  2 g Intravenous Q8H     fluticasone  1 spray Both Nostrils Daily     guaiFENesin  600 mg Oral BID     ipratropium - albuterol 0.5 mg/2.5 mg/3 mL  3 mL Nebulization 4x daily     loratadine  10 mg Oral Daily     pantoprazole  40 mg Oral QAM AC     potassium chloride ER  10 mEq Oral BID     pravastatin  40 mg Oral At Bedtime     predniSONE  40 mg Oral Daily     sodium chloride (PF)  3 mL Intracatheter Q8H     vitamin D3  2,000 Units Oral Daily       Data   All microbiology laboratory data reviewed.  Recent Labs   Lab Test 01/10/23  0529 01/09/23  0628 01/08/23  0547   WBC 9.0 9.1 7.1   HGB 11.8 12.7 12.3   HCT 35.7 38.2 36.3   MCV 90 92 90    252 230     Recent Labs   Lab Test 01/10/23  0529 01/09/23  0523 01/08/23  0547   CR 0.98 0.99 0.85     No lab results found.  No lab results found.    Invalid input(s):     All cultures:  Recent Labs   Lab 01/09/23  1549 01/09/23  1256 01/07/23  1555 01/07/23  1512   CULTURE No growth after 12 hours No growth after 12 hours Positive on the 1st day of incubation*  Staphylococcus epidermidis* Positive on the 1st day of incubation*  Micrococcus species*      Blood culture:  Results for orders placed or performed during the hospital encounter of 01/07/23   Blood Culture Hand, Right    Specimen: Hand, Right; Blood   Result Value Ref Range    Culture No growth after 12 hours    Blood Culture Hand, Right    Specimen: Hand, Right; Blood   Result Value Ref Range    Culture No growth after 12 hours    Blood Culture Peripheral Blood    Specimen: Peripheral Blood   Result Value Ref Range    Culture Positive on the 1st day of incubation (A)     Culture Micrococcus species (AA)    Blood Culture Peripheral Blood    Specimen: Peripheral Blood   Result Value Ref  "Range    Culture Positive on the 1st day of incubation (A)     Culture Staphylococcus epidermidis (AA)        Susceptibility    Staphylococcus epidermidis - DANIKA*     Oxacillin* <=0.25 Susceptible ug/mL      * Oxacillin susceptible isolates are susceptible to cephalosporins (example: cefazolin and cephalexin) and beta lactam combination agents. Oxacillin resistant isolates are resistant to these agents.     Gentamicin <=0.5 Susceptible ug/mL     Ciprofloxacin <=0.5 Susceptible ug/mL     Levofloxacin <=0.12 Susceptible ug/mL     Erythromycin >=8 Resistant ug/mL     Clindamycin >=8 Resistant ug/mL     Vancomycin 2 Susceptible ug/mL     Tetracycline <=1 Susceptible ug/mL     * Antibiotics listed as \"No Interpretation\" have no regulatory guidelines for susceptibility/resistance available.   Results for orders placed or performed during the hospital encounter of 07/22/21   Blood Culture Arm, Right    Specimen: Arm, Right; Blood   Result Value Ref Range    Culture No Growth    Blood Culture Arm, Left    Specimen: Arm, Left; Blood   Result Value Ref Range    Culture No Growth       Urine culture:  No results found for this or any previous visit.         "

## 2023-01-10 NOTE — PLAN OF CARE
Goal Outcome Evaluation:       A&O x's 4 VSS on R/A Lungs sounds - clear/diminished. Bowel Sounds - normoactive, constipated. PRN senna to start tonight. Urine Output - continent, ambulates to bathroom. Ambulation - Assist of 2 with gait belt/walker. Diet reg, no caffeine per stress test scheduled for 1/10. Pain controlled by - pain denied during shift. Echo done today. ID following, PT/OT following.

## 2023-01-11 ENCOUNTER — APPOINTMENT (OUTPATIENT)
Dept: OCCUPATIONAL THERAPY | Facility: CLINIC | Age: 88
DRG: 602 | End: 2023-01-11
Payer: COMMERCIAL

## 2023-01-11 ENCOUNTER — APPOINTMENT (OUTPATIENT)
Dept: PHYSICAL THERAPY | Facility: CLINIC | Age: 88
DRG: 602 | End: 2023-01-11
Attending: INTERNAL MEDICINE
Payer: COMMERCIAL

## 2023-01-11 LAB
ANION GAP SERPL CALCULATED.3IONS-SCNC: 6 MMOL/L (ref 3–14)
BACTERIA BLD CULT: ABNORMAL
BACTERIA BLD CULT: ABNORMAL
BUN SERPL-MCNC: 21 MG/DL (ref 7–30)
CALCIUM SERPL-MCNC: 8.9 MG/DL (ref 8.5–10.1)
CHLORIDE BLD-SCNC: 109 MMOL/L (ref 94–109)
CO2 SERPL-SCNC: 26 MMOL/L (ref 20–32)
CREAT SERPL-MCNC: 0.88 MG/DL (ref 0.52–1.04)
GFR SERPL CREATININE-BSD FRML MDRD: 62 ML/MIN/1.73M2
GLUCOSE BLD-MCNC: 113 MG/DL (ref 70–99)
MAGNESIUM SERPL-MCNC: 2.4 MG/DL (ref 1.6–2.3)
POTASSIUM BLD-SCNC: 3.9 MMOL/L (ref 3.4–5.3)
SODIUM SERPL-SCNC: 141 MMOL/L (ref 133–144)

## 2023-01-11 PROCEDURE — 94640 AIRWAY INHALATION TREATMENT: CPT

## 2023-01-11 PROCEDURE — 250N000013 HC RX MED GY IP 250 OP 250 PS 637: Performed by: INTERNAL MEDICINE

## 2023-01-11 PROCEDURE — 97161 PT EVAL LOW COMPLEX 20 MIN: CPT | Mod: GP

## 2023-01-11 PROCEDURE — 250N000011 HC RX IP 250 OP 636: Performed by: HOSPITALIST

## 2023-01-11 PROCEDURE — 83735 ASSAY OF MAGNESIUM: CPT | Performed by: INTERNAL MEDICINE

## 2023-01-11 PROCEDURE — 99232 SBSQ HOSP IP/OBS MODERATE 35: CPT | Performed by: HOSPITALIST

## 2023-01-11 PROCEDURE — 80048 BASIC METABOLIC PNL TOTAL CA: CPT | Performed by: HOSPITALIST

## 2023-01-11 PROCEDURE — 36415 COLL VENOUS BLD VENIPUNCTURE: CPT | Performed by: HOSPITALIST

## 2023-01-11 PROCEDURE — 250N000013 HC RX MED GY IP 250 OP 250 PS 637: Performed by: HOSPITALIST

## 2023-01-11 PROCEDURE — 250N000009 HC RX 250: Performed by: HOSPITALIST

## 2023-01-11 PROCEDURE — 97140 MANUAL THERAPY 1/> REGIONS: CPT | Mod: GO | Performed by: OCCUPATIONAL THERAPIST

## 2023-01-11 PROCEDURE — 120N000001 HC R&B MED SURG/OB

## 2023-01-11 PROCEDURE — 250N000011 HC RX IP 250 OP 636: Performed by: INTERNAL MEDICINE

## 2023-01-11 PROCEDURE — 97110 THERAPEUTIC EXERCISES: CPT | Mod: GO | Performed by: OCCUPATIONAL THERAPIST

## 2023-01-11 PROCEDURE — 97116 GAIT TRAINING THERAPY: CPT | Mod: GP

## 2023-01-11 PROCEDURE — 250N000012 HC RX MED GY IP 250 OP 636 PS 637: Performed by: INTERNAL MEDICINE

## 2023-01-11 RX ORDER — GUAIFENESIN 600 MG/1
600 TABLET, EXTENDED RELEASE ORAL 2 TIMES DAILY
DISCHARGE
Start: 2023-01-11 | End: 2023-01-16

## 2023-01-11 RX ORDER — CARVEDILOL 25 MG/1
25 TABLET ORAL 2 TIMES DAILY WITH MEALS
DISCHARGE
Start: 2023-01-11

## 2023-01-11 RX ORDER — FUROSEMIDE 40 MG
40 TABLET ORAL DAILY
Status: DISCONTINUED | OUTPATIENT
Start: 2023-01-12 | End: 2023-01-12 | Stop reason: HOSPADM

## 2023-01-11 RX ORDER — FUROSEMIDE 20 MG
20 TABLET ORAL DAILY
Status: DISCONTINUED | OUTPATIENT
Start: 2023-01-12 | End: 2023-01-11

## 2023-01-11 RX ORDER — FUROSEMIDE 20 MG
20 TABLET ORAL DAILY
Status: DISCONTINUED | OUTPATIENT
Start: 2023-01-11 | End: 2023-01-11

## 2023-01-11 RX ORDER — FUROSEMIDE 20 MG
20 TABLET ORAL ONCE
Status: COMPLETED | OUTPATIENT
Start: 2023-01-11 | End: 2023-01-11

## 2023-01-11 RX ORDER — ENOXAPARIN SODIUM 100 MG/ML
40 INJECTION SUBCUTANEOUS EVERY 24 HOURS
Status: DISCONTINUED | OUTPATIENT
Start: 2023-01-11 | End: 2023-01-12 | Stop reason: HOSPADM

## 2023-01-11 RX ORDER — AMLODIPINE BESYLATE 5 MG/1
5 TABLET ORAL DAILY
DISCHARGE
Start: 2023-01-12

## 2023-01-11 RX ADMIN — ACETAMINOPHEN 650 MG: 325 TABLET, FILM COATED ORAL at 17:24

## 2023-01-11 RX ADMIN — POTASSIUM CHLORIDE 10 MEQ: 750 TABLET, EXTENDED RELEASE ORAL at 20:54

## 2023-01-11 RX ADMIN — AMLODIPINE BESYLATE 5 MG: 5 TABLET ORAL at 08:35

## 2023-01-11 RX ADMIN — CEFAZOLIN SODIUM 2 G: 2 INJECTION, SOLUTION INTRAVENOUS at 20:54

## 2023-01-11 RX ADMIN — PANTOPRAZOLE SODIUM 40 MG: 40 TABLET, DELAYED RELEASE ORAL at 07:19

## 2023-01-11 RX ADMIN — POTASSIUM CHLORIDE 10 MEQ: 750 TABLET, EXTENDED RELEASE ORAL at 08:36

## 2023-01-11 RX ADMIN — PRAVASTATIN SODIUM 40 MG: 20 TABLET ORAL at 20:53

## 2023-01-11 RX ADMIN — CEFAZOLIN SODIUM 2 G: 2 INJECTION, SOLUTION INTRAVENOUS at 14:06

## 2023-01-11 RX ADMIN — PREDNISONE 40 MG: 20 TABLET ORAL at 08:35

## 2023-01-11 RX ADMIN — AZITHROMYCIN MONOHYDRATE 250 MG: 250 TABLET ORAL at 08:35

## 2023-01-11 RX ADMIN — FUROSEMIDE 20 MG: 20 TABLET ORAL at 17:24

## 2023-01-11 RX ADMIN — BENZONATATE 100 MG: 100 CAPSULE ORAL at 22:57

## 2023-01-11 RX ADMIN — GUAIFENESIN 600 MG: 600 TABLET, EXTENDED RELEASE ORAL at 08:35

## 2023-01-11 RX ADMIN — ACETAMINOPHEN 650 MG: 325 TABLET, FILM COATED ORAL at 22:57

## 2023-01-11 RX ADMIN — CARVEDILOL 25 MG: 25 TABLET, FILM COATED ORAL at 17:24

## 2023-01-11 RX ADMIN — CARVEDILOL 25 MG: 25 TABLET, FILM COATED ORAL at 08:35

## 2023-01-11 RX ADMIN — GUAIFENESIN 600 MG: 600 TABLET, EXTENDED RELEASE ORAL at 20:54

## 2023-01-11 RX ADMIN — ENOXAPARIN SODIUM 40 MG: 40 INJECTION SUBCUTANEOUS at 08:37

## 2023-01-11 RX ADMIN — IPRATROPIUM BROMIDE AND ALBUTEROL SULFATE 3 ML: .5; 3 SOLUTION RESPIRATORY (INHALATION) at 07:40

## 2023-01-11 RX ADMIN — Medication 2000 UNITS: at 08:35

## 2023-01-11 RX ADMIN — CEFAZOLIN SODIUM 2 G: 2 INJECTION, SOLUTION INTRAVENOUS at 04:39

## 2023-01-11 RX ADMIN — FLUTICASONE PROPIONATE 1 SPRAY: 50 SPRAY, METERED NASAL at 08:39

## 2023-01-11 RX ADMIN — LORATADINE 10 MG: 10 TABLET ORAL at 08:35

## 2023-01-11 ASSESSMENT — ACTIVITIES OF DAILY LIVING (ADL)
ADLS_ACUITY_SCORE: 41
ADLS_ACUITY_SCORE: 40
ADLS_ACUITY_SCORE: 41
ADLS_ACUITY_SCORE: 40

## 2023-01-11 NOTE — PLAN OF CARE
Goal Outcome Evaluation:    Orientations: AOx4 hard of hearing  Vitals/Pain:  VSS except hypertensive sometimes.  Otherwise on RA. Pt denies any chest pain. CMS intact. Denies any nausea and pain this shift.   Lines/Drains: 2 PIV SL   Skin/Wounds: BLE swelling. RLE open ulcers, dressing changed per WOC orders, CDI.   GI/:  Tolerating 2 gm NA diet. Walks to bedside commode, Good UOP. +BM this shift.   Ambulation/Assist: Assist GB/ walker  Plan: Wound consult placed. OT for lymphedema wraps. Plan of care continues.

## 2023-01-11 NOTE — PLAN OF CARE
Goal Outcome Evaluation:      Plan of Care Reviewed With: patient    Overall Patient Progress: no changeOverall Patient Progress: no change       Orientations: A/Ox4, opens eyes spontaneously   Vitals/Pain: VSS on RA, denies pain  LS: persistent cough, lungs are diminished  Lines/Drains: 2 R lower forearm PIV, intermittent abx, SL  Skin/Wounds: Bilateral cellulitis and edema, bilateral LE lymph wraps. R shin ulcer, XIMENA   GI/: Voiding adequately via bedside commode, BM+  Labs: K+ and Mag recheck in AM  Ambulation/Assist: A2 GB W  Sleep Quality: Fair, alert during assessments, slept well in the morning.   Plan: Wound care once daily, intermittent abx. Possible dx to TCU once placement is found.

## 2023-01-11 NOTE — PROGRESS NOTES
Care Management Follow Up    Length of Stay (days): 4    Expected Discharge Date: 01/12/2023     Concerns to be Addressed: discharge planning  Patient wants to transition home, daughter feels her mother needs to transition to a TCU  Patient plan of care discussed at interdisciplinary rounds: Yes    Anticipated Discharge Disposition: Transitional Care     Anticipated Discharge Services: Other (see comment) (rehab)  Anticipated Discharge DME: None    Patient/family educated on Medicare website which has current facility and service quality ratings: yes  Education Provided on the Discharge Plan:    Patient/Family in Agreement with the Plan: yes    Referrals Placed by CM/SW: Post Acute Facilities  Private pay costs discussed: Not applicable    Additional Information:  No accepting TCU yet in the [employer] network. Additional referrals sent to facilities known to take [employer], per protocol. SW following.       NAVI Bryant, SW  [phone] Desk phone  [phone] Cell/text (Preferred)      Addendum: Pt accepted to [facility]. Pt updated and in agreement. Facility will start prior-auth process.

## 2023-01-11 NOTE — PLAN OF CARE
A+Ox4 VSS on room air. Lung sounds diminished. Persistent cough, tessalon given. Denies pain. BLE lymphedema wraps in place. Bowels active, flatus +. Voiding adequately. Tolerating diet. Up w/ 2.

## 2023-01-11 NOTE — PROGRESS NOTES
01/11/23 0072   Appointment Info   Signing Clinician's Name / Credentials (PT) Brittany Dressler, PT, DPT   Living Environment   People in Home alone   Current Living Arrangements independent living facility  (Senior living apartments)   Home Accessibility   (No stairs, 6 floor, elevator)   Transportation Anticipated family or friend will provide   Living Environment Comments Dtr comes to help, is , works full-time.   Self-Care   Usual Activity Tolerance moderate   Current Activity Tolerance fair   Regular Exercise Yes   Activity/Exercise Type walking   Equipment Currently Used at Home walker, rolling   Fall history within last six months yes   Number of times patient has fallen within last six months 3  (3 within last 6 months)   Activity/Exercise/Self-Care Comment 1mi [place] RW with unstable knee PLOF with some UE WB into 4WW.   General Information   Onset of Illness/Injury or Date of Surgery 01/07/23   Referring Physician Berto Muir MD   Patient/Family Therapy Goals Statement (PT) Stay independent, do what needs to be done.   Pertinent History of Current Problem (include personal factors and/or comorbidities that impact the POC) R LE cellulitis, NSTEMI 2,chest pain with bronchitis.   Existing Precautions/Restrictions fall   Cognition   Affect/Mental Status (Cognition) WNL   Orientation Status (Cognition) oriented x 4   Follows Commands (Cognition) WNL   Safety Deficit (Cognition) awareness of need for assistance;insight into deficits/self-awareness;problem-solving   Pain Assessment   Patient Currently in Pain Yes, see Vital Sign flowsheet  (Mild R knee pain - activity to tolerance)   Integumentary/Edema   Integumentary/Edema Comments B LEs - OT managing lymphedema   Posture    Posture Comments Impaired controlled mobility, stability   Range of Motion (ROM)   ROM Comment ~neutral DF B, otherwise WFL.   Strength (Manual Muscle Testing)   Strength Comments R knee instability showing weakness, UE  dpeendent to power to stand, limited activity tolerance with SOB.   Transfers   Transfers sit-stand transfer   Sit-Stand Transfer   Sit-Stand [place] (Transfers) contact guard   Assistive Device (Sit-Stand Transfers) walker, front-wheeled   Gait/Stairs (Locomotion)   [place] Level (Gait) contact guard   Assistive Device (Gait) walker, front-wheeled   Distance in Feet 300 ft   Distance in Feet (Gait) 300 ft   Pattern (Gait) 3-point   Balance   Balance Comments Falls: R knee unstable, limited activity tolerance, unsteady, hx falls, impaired safe decision-making.   Coordination   Coordination Comments WFL   Muscle Tone   Muscle Tone Comments WFL   Clinical Impression   Criteria for Skilled Therapeutic Intervention Yes, treatment indicated   PT Diagnosis (PT) Impaired mobility   Influenced by the following impairments Impaired activity tolerance, ROM, power, balance, safety awareness.   Functional limitations due to impairments Impaired independent mobility & living   Clinical Presentation (PT Evaluation Complexity) Evolving/Changing   Clinical Decision Making (Complexity) low complexity   Planned Therapy Interventions (PT) balance training;bed mobility training;gait training;home exercise program;neuromuscular re-education;patient/family education;postural re-education;ROM (range of motion);stair training;strengthening;transfer training;progressive activity/exercise;risk factor education;home program guidelines   Anticipated Equipment Needs at Discharge (PT) walker, rolling;gait belt;raised toilet seat;tub bench   Risk & Benefits of therapy have been explained evaluation/treatment results reviewed;risks/benefits reviewed;care plan/treatment goals reviewed;current/potential barriers reviewed;participants voiced agreement with care plan;participants included;patient   PT Total Evaluation Time   PT Eval, Low Complexity Minutes (75000) 5   Physical Therapy Goals   PT Frequency 5x/week   PT Predicted  Duration/Target Date for Goal Attainment 01/18/23   PT Goals Bed Mobility;Transfers;Gait   PT: Bed Mobility Supine to/from sit;Rolling   PT: Transfers Modified independent;Assistive device;Sit to/from stand;Bed to/from chair   PT: Gait Greater than 200 feet;Rolling walker;Modified independent  (with good safe decision-making on pacing)   Interventions   Interventions Quick Adds Gait Training   Gait Training   Gait Training Minutes (87100) 25   Symptoms Noted During/After Treatment (Gait Training) fatigue;shortness of breath   Treatment Detail/Skilled Intervention Safety spotting, RW sizing, VC pacing given observable progression of R knee instability affecting safe mobility with limited pt bioawarness.   [place] Level (Gait Training) contact guard   Physical Assistance Level (Gait Training) verbal cues   Assistive Device (Gait Training) rolling walker   Pattern Analysis (Gait Training) 3-point gait   Gait Analysis Deviations decreased pierce;decreased step length;decreased stride length   Impairments (Gait Analysis/Training) pain  (R knee pain)   PT Discharge Planning   PT Plan PT: safe mobility & pacing teach back, activity tolerance, balance.   PT Discharge Recommendation (DC Rec) Transitional Care Facility;home with assist;home with home care physical therapy   PT Rationale for DC Rec Pt lives alone, no stairs, 4WW PLOF 1mi with 3 falls (balance, safety awareness). If family can provide CGA with mobiltiy pt can return home with home PT, otherwise TCU waranted to return to independent function first.   PT Brief overview of current status 300' CGA RW mild SOB, R knee unstable.   Total Session Time   Timed Code Treatment Minutes 25   Total Session Time (sum of timed and untimed services) 30

## 2023-01-11 NOTE — PROGRESS NOTES
[facility]    Medicine Progress Note - Hospitalist Service    Date of Admission:  1/7/2023    Assessment & Plan   Deborah Higginbotham is a 89 year old female who was admitted on 1/7/2023.    Assessment and plan  This is a 89-year-old female with history of breast cancer, hypertension, hyperlipidemia, GERD, came to the ED with multiple problems including cold and cough for the last couple of weeks, not getting better, nasal drainage, uncontrolled blood pressure and developing swelling and redness on the lower extremities.    Right lower extremity cellulitis with venous stasis ulcer  1/2 Blood cx, staph epi, likely contaminant  Presented with worsening lower extremity swelling. Most likely this is venous stasis and venous stasis ulcer causing her cellulitis on the right leg. Right leg is erythematous, warm and slightly tender with weeping ulcers. Received vancomycin and ceftriaxone in the ED. Bilateral lower extremity US was negative for DVT. Blood cx with staph epi  - continue IV cefazolin 2g q8h while inpatient, discharge with oral keflex to complete 7 day course tx.  - lymphedema appreciated  - WOC appreciated  - ID appreciated  - recommended to TCU, [employer] options are limited, but SW sending referrals    Bilateral lower extremity swelling:    Hypokalemia secondary to diuresis  The cause is uncertain at this time. Given her uncontrolled hypertension, need to rule out any congestive heart failure. Received intermittent IV diuresis with lasix.  * Echo: normal biventricular size and systolic function. EF 60-65%, no WMA.    Recurrent chest pain in the setting of hypertensive emergency and acute bronchitis  NSTEMI, type 2 secondary to demand with hypertensive emergency  Patient complains of intermittent chest pain and also cough is not controlled by Robitussin, tessalon added. Had RRT for chest pain, serial trop 11-.  * 1/10 stress test negative for induced ischemia  - Most likely chest  pain was secondary to hypertensive emergency as well as her significant cough with acute bronchitis  - cardiology appreciated    Hypertensive emergency- resolved  BP up to 226/118. PTA on metoprolol.  - appreciate cardiology input  - coreg 25mg BID, amlodipine 5mg daily added  - Diuresed with lasix 20mg IV q8h, last dose 1/9 evening, start oral furosemide 40mg daily on 1/12 AM    Acute bronchitis and postnasal drip:   The patient has had cold symptoms for the last couple of weeks. It is getting slightly better, but still coughing quite a bit, coughing up some phlegm.  Chest x-ray does not show any obvious pneumonia.  On examination, does have some wheezing, likely acute bronchitis  - completed 5 days azithromycin on 1/11  - mucinex BID  - Prednisone 40 mg p.o. daily for 5 days, completes on 1/12    Morbid obesity with BMI of 36.7;  Complicates care     Hyperlipidemia: PTA pravastatin continued  History of gastroesophageal reflux disease:  PTA protonix continued     Diet: Combination Diet Regular Diet Adult; 2 gm NA Diet    DVT Prophylaxis: lovenox  Ferraro Catheter: Not present  Lines: None     Cardiac Monitoring: None  Code Status: No CPR- Do NOT Intubate      Clinically Significant Risk Factors              # Hypoalbuminemia: Lowest albumin = 3.3 g/dL at 1/8/2023  5:47 AM, will monitor as appropriate           # Obesity: Estimated body mass index is 36.73 kg/m  as calculated from the following:    Height as of this encounter: 1.524 m (5').    Weight as of this encounter: 85.3 kg (188 lb 0.8 oz)., PRESENT ON ADMISSION         Disposition Plan      Expected Discharge Date: 01/12/2023,  3:00 PM    Destination: home with help/services;inpatient rehabilitation facility  Discharge Comments: 1/9 echo, nuc med test today, D/C w/ HC  1/11 waiting for placement TCU          Laurel Benitez  Hospitalist Service  [facility]  Securely message with [employer] (more info)  Text page via [employer] Paging/Directory    ______________________________________________________________________    Interval History   Patient seen and examined. She is a little uncomfortable sitting up in the chair, but otherwise she seems to be doing okay. Reviewed results of stress test, she states she knew it would be fine. Reviewed TCU referrals that have been sent, no bed found yet. Cough overall much improved. Still with edematous legs.    Physical Exam   Vital Signs: Temp: 98.4  F (36.9  C) Temp src: Oral BP: (!) 156/90 Pulse: 64   Resp: 18 SpO2: 95 % O2 Device: None (Room air)    Weight: 188 lbs .84 oz    Constitutional: Awake, alert, cooperative, no apparent distress  Respiratory: Clear to auscultation bilaterally, no crackles or wheezing, infrequent cough  Cardiovascular: Regular rate and rhythm, normal S1 and S2, and no murmur noted  GI: Normal bowel sounds, soft, non-distended, non-tender  Skin/Integumen: No rashes, no cyanosis, +1 lower extremity edema with lymphedema wraps in place  Other:     Medical Decision Making       35 MINUTES SPENT BY ME on the date of service doing chart review, history, exam, documentation & further activities per the note.      Data     I have personally reviewed the following data over the past 24 hrs:    N/A  \   N/A   / N/A     141 109 21 /  113 (H)   3.9 26 0.88 \       Imaging results reviewed over the past 24 hrs:   No results found for this or any previous visit (from the past 24 hour(s)).

## 2023-01-12 ENCOUNTER — APPOINTMENT (OUTPATIENT)
Dept: OCCUPATIONAL THERAPY | Facility: CLINIC | Age: 88
DRG: 602 | End: 2023-01-12
Payer: COMMERCIAL

## 2023-01-12 ENCOUNTER — APPOINTMENT (OUTPATIENT)
Dept: PHYSICAL THERAPY | Facility: CLINIC | Age: 88
DRG: 602 | End: 2023-01-12
Payer: COMMERCIAL

## 2023-01-12 VITALS
RESPIRATION RATE: 19 BRPM | WEIGHT: 188.2 LBS | BODY MASS INDEX: 36.95 KG/M2 | DIASTOLIC BLOOD PRESSURE: 71 MMHG | OXYGEN SATURATION: 93 % | HEART RATE: 65 BPM | SYSTOLIC BLOOD PRESSURE: 139 MMHG | TEMPERATURE: 97.2 F | HEIGHT: 60 IN

## 2023-01-12 LAB
ANION GAP SERPL CALCULATED.3IONS-SCNC: 5 MMOL/L (ref 3–14)
BUN SERPL-MCNC: 19 MG/DL (ref 7–30)
CALCIUM SERPL-MCNC: 8.8 MG/DL (ref 8.5–10.1)
CHLORIDE BLD-SCNC: 108 MMOL/L (ref 94–109)
CO2 SERPL-SCNC: 25 MMOL/L (ref 20–32)
CREAT SERPL-MCNC: 0.82 MG/DL (ref 0.52–1.04)
GFR SERPL CREATININE-BSD FRML MDRD: 67 ML/MIN/1.73M2
GLUCOSE BLD-MCNC: 94 MG/DL (ref 70–99)
POTASSIUM BLD-SCNC: 3.7 MMOL/L (ref 3.4–5.3)
SODIUM SERPL-SCNC: 138 MMOL/L (ref 133–144)

## 2023-01-12 PROCEDURE — 250N000012 HC RX MED GY IP 250 OP 636 PS 637: Performed by: INTERNAL MEDICINE

## 2023-01-12 PROCEDURE — 97530 THERAPEUTIC ACTIVITIES: CPT | Mod: GP

## 2023-01-12 PROCEDURE — 36415 COLL VENOUS BLD VENIPUNCTURE: CPT | Performed by: HOSPITALIST

## 2023-01-12 PROCEDURE — 97116 GAIT TRAINING THERAPY: CPT | Mod: GP

## 2023-01-12 PROCEDURE — 99239 HOSP IP/OBS DSCHRG MGMT >30: CPT | Performed by: HOSPITALIST

## 2023-01-12 PROCEDURE — 97535 SELF CARE MNGMENT TRAINING: CPT | Mod: GO | Performed by: OCCUPATIONAL THERAPIST

## 2023-01-12 PROCEDURE — 250N000011 HC RX IP 250 OP 636: Performed by: HOSPITALIST

## 2023-01-12 PROCEDURE — 250N000013 HC RX MED GY IP 250 OP 250 PS 637: Performed by: INTERNAL MEDICINE

## 2023-01-12 PROCEDURE — 80048 BASIC METABOLIC PNL TOTAL CA: CPT | Performed by: HOSPITALIST

## 2023-01-12 PROCEDURE — 250N000013 HC RX MED GY IP 250 OP 250 PS 637: Performed by: HOSPITALIST

## 2023-01-12 PROCEDURE — 97140 MANUAL THERAPY 1/> REGIONS: CPT | Mod: GO | Performed by: OCCUPATIONAL THERAPIST

## 2023-01-12 RX ORDER — CEPHALEXIN 500 MG/1
500 CAPSULE ORAL 4 TIMES DAILY
DISCHARGE
Start: 2023-01-12 | End: 2023-01-15

## 2023-01-12 RX ADMIN — LORATADINE 10 MG: 10 TABLET ORAL at 08:06

## 2023-01-12 RX ADMIN — CEFAZOLIN SODIUM 2 G: 2 INJECTION, SOLUTION INTRAVENOUS at 13:43

## 2023-01-12 RX ADMIN — FUROSEMIDE 40 MG: 40 TABLET ORAL at 08:06

## 2023-01-12 RX ADMIN — ENOXAPARIN SODIUM 40 MG: 40 INJECTION SUBCUTANEOUS at 08:06

## 2023-01-12 RX ADMIN — CARVEDILOL 25 MG: 25 TABLET, FILM COATED ORAL at 08:06

## 2023-01-12 RX ADMIN — BENZONATATE 100 MG: 100 CAPSULE ORAL at 05:07

## 2023-01-12 RX ADMIN — PREDNISONE 40 MG: 20 TABLET ORAL at 08:06

## 2023-01-12 RX ADMIN — POTASSIUM CHLORIDE 10 MEQ: 750 TABLET, EXTENDED RELEASE ORAL at 08:06

## 2023-01-12 RX ADMIN — FLUTICASONE PROPIONATE 1 SPRAY: 50 SPRAY, METERED NASAL at 08:10

## 2023-01-12 RX ADMIN — Medication 2000 UNITS: at 08:06

## 2023-01-12 RX ADMIN — GUAIFENESIN 600 MG: 600 TABLET, EXTENDED RELEASE ORAL at 08:06

## 2023-01-12 RX ADMIN — CEFAZOLIN SODIUM 2 G: 2 INJECTION, SOLUTION INTRAVENOUS at 04:19

## 2023-01-12 RX ADMIN — ACETAMINOPHEN 650 MG: 325 TABLET, FILM COATED ORAL at 06:52

## 2023-01-12 RX ADMIN — PANTOPRAZOLE SODIUM 40 MG: 40 TABLET, DELAYED RELEASE ORAL at 06:53

## 2023-01-12 RX ADMIN — ACETAMINOPHEN 650 MG: 325 TABLET, FILM COATED ORAL at 13:42

## 2023-01-12 RX ADMIN — AMLODIPINE BESYLATE 5 MG: 5 TABLET ORAL at 08:06

## 2023-01-12 ASSESSMENT — ACTIVITIES OF DAILY LIVING (ADL)
ADLS_ACUITY_SCORE: 41
ADLS_ACUITY_SCORE: 40
ADLS_ACUITY_SCORE: 41
ADLS_ACUITY_SCORE: 41
ADLS_ACUITY_SCORE: 40
ADLS_ACUITY_SCORE: 41
ADLS_ACUITY_SCORE: 40
ADLS_ACUITY_SCORE: 41

## 2023-01-12 NOTE — PLAN OF CARE
Goal Outcome Evaluation:      Plan of Care Reviewed With: patient    Overall Patient Progress: improvingOverall Patient Progress: improving         Orientations: A/Ox4  Vitals/Pain: Occasionally hypertensive, all other VSS on RA. Complaints of intermittent buttocks pain, PRN tylenol x1. Tessalon x2 for cough  LS: persistent cough, lungs diminished  Lines/Drains: 2 R forearm PIV, intermittent abx, SL  Skin/Wounds: Bilateral cellulitis and edema, bilateral LE lymph wraps. R shin ulcer, XIMENA, C/D/I   GI/: Adequate output via toilet, BM+x2  Labs: K+ and Mag recheck in AM  Ambulation/Assist: A1 GB W  Sleep Quality: Good, pt briefly awake during reassessment, otherwise asleep.   Plan: Encourage activity as tolerated.

## 2023-01-12 NOTE — PLAN OF CARE
Alert and oriented x4. Vital signs stable on room air. Assist of 1 GBW. Tolerating 2gm Na diet diet. Lung sounds diminished. Bowel sounds active, passing flatus, no BM during shift, adequate urine output. RLE shin ulcer dressing changed during shift, BLE lymphedema wraps in place. Pain managed with PRN tylenol. Denies nausea. INDERJIT SALDAÑA.     Pt discharged to TCU approx 1630 via personal ride by daughter. All belongings sent with patient. All discharge instructions reviewed with patient and daughter. All questions and concerns answered and addressed.

## 2023-01-12 NOTE — DISCHARGE SUMMARY
[facility]  Hospitalist Discharge Summary      Date of Admission:  1/7/2023  Date of Discharge:  1/12/2023  Discharging Provider: Laurel Benitez  Discharge Service: Hospitalist Service    Discharge Diagnoses   Right lower extremity cellulitis with venous stasis ulcer  1/2 Blood cx, staph epi, likely contaminant  Bilateral lower extremity swelling:    Hypokalemia secondary to diuresis  Recurrent chest pain in the setting of hypertensive emergency and acute bronchitis  NSTEMI, type 2 secondary to demand with hypertensive emergency  Hypertensive emergency- resolved  Acute bronchitis and postnasal drip  Morbid obesity with BMI of 36.7  Hyperlipidemia  History of gastroesophageal reflux disease    Follow-ups Needed After Discharge   Follow-up Appointments     Follow Up and recommended labs and tests      Follow up with PCP after discharge from TCU.     Follow up BMP in 5 days while at TCU             Unresulted Labs Ordered in the Past 30 Days of this Admission     Date and Time Order Name Status Description    1/9/2023 11:16 AM Blood Culture Hand, Right Preliminary     1/9/2023 11:16 AM Blood Culture Hand, Right Preliminary       These results will be followed up by TCU physician    Discharge Disposition   Discharged to TCU  Condition at discharge: Stable    Hospital Course   Deborah Higginbotham is a 89 year old female who was admitted on 1/7/2023.    Assessment and plan  This is a 89-year-old female with history of breast cancer, hypertension, hyperlipidemia, GERD, came to the ED with multiple problems including cold and cough for the last couple of weeks, not getting better, nasal drainage, uncontrolled blood pressure and developing swelling and redness on the lower extremities.    Right lower extremity cellulitis with venous stasis ulcer  1/2 Blood cx, staph epi, likely contaminant  Presented with worsening lower extremity swelling. Most likely this is venous stasis and venous stasis ulcer  causing her cellulitis on the right leg. Right leg is erythematous, warm and slightly tender with weeping ulcers. Received vancomycin and ceftriaxone in the ED. Bilateral lower extremity US was negative for DVT. Blood cx with staph epi  - continue IV cefazolin 2g q8h while inpatient, discharge with 2.5 days of oral keflex QID to complete 7 day course tx.  - lymphedema appreciated  - WOC appreciated  - ID appreciated  - discharge to TCU  - follow up with PCP upon discharge from TCU    Bilateral lower extremity swelling:    Hypokalemia secondary to diuresis  The cause is uncertain at this time. Given her uncontrolled hypertension, need to rule out any congestive heart failure. Received intermittent IV diuresis with lasix.  * Echo: normal biventricular size and systolic function. EF 60-65%, no WMA.    Recurrent chest pain in the setting of hypertensive emergency and acute bronchitis  NSTEMI, type 2 secondary to demand with hypertensive emergency  Patient complains of intermittent chest pain and also cough is not controlled by Robitussin, tessalon added. Had RRT for chest pain, serial trop 11-.  * 1/10 stress test negative for induced ischemia  - Most likely chest pain was secondary to hypertensive emergency as well as her significant cough with acute bronchitis  - cardiology appreciated    Hypertensive emergency- resolved  BP up to 226/118. PTA on metoprolol and just started on clonidine for high BPs--both discontinued  - appreciate cardiology input  - low salt diet  - coreg 25mg BID, amlodipine 5mg daily added  - Diuresed with lasix 20mg IV q8h, last dose 1/9 evening, resumed PTA oral furosemide 40mg daily on 1/12 AM  - BMP in 5 days.    Acute bronchitis and postnasal drip:   The patient has had cold symptoms for the last couple of weeks. It is getting slightly better, but still coughing quite a bit, coughing up some phlegm.  Chest x-ray does not show any obvious pneumonia.  On examination, does have some  wheezing, likely acute bronchitis  - completed 5 days azithromycin on 1/11  - mucinex BID for 5 more days on discharge  - Prednisone 40 mg p.o. x 5 days, completed on 1/12    Morbid obesity with BMI of 36.7;  Complicates care     Hyperlipidemia: PTA pravastatin continued  History of gastroesophageal reflux disease:  She no longer takes PTA protonix--discontinued med on discharge    Consultations This Hospital Stay   PHARMACY TO DOSE VANCO  CARE MANAGEMENT / SOCIAL WORK IP CONSULT  PHYSICAL THERAPY ADULT IP CONSULT  OCCUPATIONAL THERAPY ADULT IP CONSULT  WOUND OSTOMY CONTINENCE NURSE  IP CONSULT  LYMPHEDEMA THERAPY IP CONSULT  CARDIOLOGY IP CONSULT  PHARMACY IP CONSULT  PHARMACY IP CONSULT  WOUND OSTOMY CONTINENCE NURSE  IP CONSULT  PHARMACY TO DOSE VANCO  INFECTIOUS DISEASES IP CONSULT  PHYSICAL THERAPY ADULT IP CONSULT  OCCUPATIONAL THERAPY ADULT IP CONSULT  LYMPHEDEMA THERAPY IP CONSULT    Code Status   No CPR- Do NOT Intubate    Time Spent on this Encounter   ILaurel, personally saw the patient today and spent greater than 30 minutes discharging this patient.       Laurel ROBLES [place] [address] [address] [address]  Phone: [phone]  ______________________________________________________________________    Physical Exam   Vital Signs: Temp: 97.2  F (36.2  C) Temp src: Oral BP: 139/71 Pulse: 65   Resp: 19 SpO2: 93 % O2 Device: None (Room air)    Weight: 188 lbs 3.2 oz    Patient seen and examined. She is doing well, cough continues to improve. No shortness of breath, chest pain, nausea, vomiting. Continue diuresis and lower extremity lymph wraps. Discussed plan for discharge to TCU, bed available this afternoon and family to transport. Stable for discharge to TCU.    Constitutional: Awake, alert, cooperative, no apparent distress  Respiratory: Clear to auscultation bilaterally, no crackles or wheezing, infrequent cough only when asked to take deep  breaths  Cardiovascular: Regular rate and rhythm, normal S1 and S2, and no murmur noted  GI: Normal bowel sounds, soft, non-distended, non-tender  Skin/Integumen: No rashes, no cyanosis, +1 lower extremity edema with lymphedema wraps in place  Other:         Primary Care Physician   Stu Stanford    Discharge Orders      General info for SNF    Length of Stay Estimate: Short Term Care: Estimated # of Days <30  Condition at Discharge: Improving  Level of care:skilled   Rehabilitation Potential: Good  Admission H&P remains valid and up-to-date: Yes  Recent Chemotherapy: N/A  Use Nursing Home Standing Orders: Yes     Mantoux instructions    Give two-step Mantoux (PPD) Per Facility Policy Yes     Follow Up and recommended labs and tests    Follow up with PCP after discharge from TCU.     Follow up BMP in 5 days while at TCU     Reason for your hospital stay    Bronchitis. Hypertensive emergency     Activity - Up with nursing assistance     Brief Discharge Instructions    1. Blood pressure medications have changed. Stop taking metoprolol and clonidine. Start taking carvedilol 25mg BID and amlodipine 5mg once daily.    2. Take keflex 4x daily for 2.5 more days to complete 7 day total course of treatment    3. Take mucinex 500mg twice daily for another 5 days to help with congestion/cough     Wound care (specify)    RLE wound(s): Daily    1. Cleanse with Vashe (703492) soaked gauze on wounds for 10 minutes.   2. Apply no sting barrier film to periwound skin.  3. Apply plurogel (144537) to wound bed.  4. Cover with Polymem foam (#367049). Secure with lymphedema products.     Pannus: Daily:  Interdry(order#[id number]):   1.  Wash skin gently. Pat, do not rub.  2.  With clean scissors, cut enough fabric to cover the affected area, allowing for a minimum of 2 inches to extend beyond the skin fold for moisture evaporation.  3.  Lay a single layer of fabric in the skin fold, placing one edge into the base of the fold. Gently  smooth the rest of the fabric over the skin, keeping it flat.  4.  Leave at least 2 inches of fabric exposed outside the skin fold.  5.  Secure the fabric in one of several ways: with the skin fold, with a small amount of skin-friendly tape, or tucked under clothing.  6.  Remove the fabric before bathing and reuse it when finished. When removing, gently separate the skin fold and lift away.  Helpful Hints  1. InterDry  can be written on with a ballpoint pen. It may be helpful to label each piece of InterDry  with the date you started using it.  2.  Each piece of InterDry  may be used up to 5 days, depending on fabric soiling, odor, amount of moisture and general skin condition. Replace InterDry  if it becomes soiled with blood, urine or stool.  3.  Do not use creams, ointments, or powders with InterDry  as it may interfere with product efficacy.     Physical Therapy Adult Consult    Evaluate and treat as clinically indicated.    Reason:  deconditioning     Occupational Therapy Adult Consult    Evaluate and treat as clinically indicated.    Reason:  deconditioning     Lymphedema Therapy Adult Consult    Evaluate and treat as clinically indicated.    Reason:  lower extremity edema     Fall precautions     Diet    Follow this diet upon discharge:  2 gm NA Diet       Significant Results and Procedures   Most Recent 3 CBC's:  Recent Labs   Lab Test 01/10/23  0529 01/09/23  0628 01/08/23  0547   WBC 9.0 9.1 7.1   HGB 11.8 12.7 12.3   MCV 90 92 90    252 230     Most Recent 3 BMP's:  Recent Labs   Lab Test 01/12/23  0551 01/11/23  0536 01/10/23  0529    141 138   POTASSIUM 3.7 3.9 3.6   CHLORIDE 108 109 105   CO2 25 26 24   BUN 19 21 25   CR 0.82 0.88 0.98   ANIONGAP 5 6 9   ARABELLA 8.8 8.9 8.6   GLC 94 113* 110*   ,   Results for orders placed or performed during the hospital encounter of 01/07/23   XR Chest 2 Views    Narrative    EXAM: XR CHEST 2 VIEWS  LOCATION: [facility]  DATE/TIME:  2023 1:46 PM    INDICATION: Shortness of breath  COMPARISON: X-ray 2018      Impression    IMPRESSION: No acute findings. The lungs are clear and there are no pleural effusions. Stable cardiomegaly and stable prominent tortuous thoracic aorta. Spinal degenerative changes.   US Lower Extremity Venous Duplex Bilateral    Narrative    EXAM: US LOWER EXTREMITY VENOUS DUPLEX BILATERAL  LOCATION: [facility]  DATE/TIME: 2023 2:06 PM    INDICATION: leg swelling  COMPARISON: None.  TECHNIQUE: Venous Duplex ultrasound of bilateral lower extremities with and without compression, augmentation and duplex. Color flow and spectral Doppler with waveform analysis performed.    FINDINGS: Exam includes the common femoral, femoral, popliteal veins as well as segmentally visualized deep calf veins and greater saphenous vein.     RIGHT: No deep vein thrombosis. No superficial thrombophlebitis. No popliteal cyst. Please note, nonvisualization of the peroneal vein. Soft tissue edema of the calf.    LEFT: No deep vein thrombosis. No superficial thrombophlebitis. No popliteal cyst. Please note, nonvisualization of the peroneal vein. Soft tissue edema of the calf.      Impression    IMPRESSION:  1.  No deep venous thrombosis in the bilateral lower extremities.  2.  Nonvisualization of the peroneal veins bilaterally. Soft tissue swelling identified within the right and left calf.   Echocardiogram Complete     Value    LVEF  60-65%    Narrative    [id number]  [id number]  [id number]  [id number]^ABIGAIL^MARIPOSA^NESTOR     [facility]  Echocardiography Laboratory  [address] [place] [address]     Name: TANYA GIBBS  MRN: [id number]  : 1931  Study Date: 2023 11:19 AM  Age: 89 yrs  Gender: Female  Patient Location: [facility]  Reason For Study: Hypertension (HTN)  Ordering Physician: MARIPOSA HAYES  Referring Physician: Stu Stanford  Performed By: wGen Quiroz     BSA: 1.8  m2  Height: 60 in  Weight: 185 lb  HR: 66  BP: 135/77 mmHg  ______________________________________________________________________________  Procedure  Complete Echo Adult.  ______________________________________________________________________________  Interpretation Summary     1. Normal biventricular size and function. Left ventricular ejection fraction  of 60-65%. Mild LVH.  2. No segmental wall motion abnormalities noted.  3. No hemodynamically significant valvular disease.  Compared to the previous echocardiogram, there are no significant changes.  ______________________________________________________________________________  Left Ventricle  The left ventricle is normal in size. There is mild eccentric left ventricular  hypertrophy. Left ventricular systolic function is normal. The visual ejection  fraction is 60-65%. Grade I or early diastolic dysfunction. No regional wall  motion abnormalities noted.     Right Ventricle  The right ventricle is normal in structure, function and size.     Atria  The left atrium is mildly dilated. Right atrial size is normal.     Mitral Valve  The mitral valve is normal in structure and function. There is moderate mitral  annular calcification. There is trace mitral regurgitation.     Tricuspid Valve  No tricuspid regurgitation. Right ventricular systolic pressure could not be  approximated due to inadequate tricuspid regurgitation.     Aortic Valve  The aortic valve is not well visualized. No aortic stenosis is present.     Pulmonic Valve  The pulmonic valve is not well visualized.     Vessels  Normal size aorta. IVC diameter <2.1 cm collapsing >50% with sniff suggests a  normal RA pressure of 3 mmHg.     Pericardium  Trivial pericardial effusion.     Rhythm  Sinus rhythm was noted.  ______________________________________________________________________________  MMode/2D Measurements & Calculations  IVSd: 0.99 cm     LVIDd: 5.3 cm  LVIDs: 3.5 cm  LVPWd: 0.99 cm  FS: 34.8 %  LV  mass(C)d: 199.5 grams  LV mass(C)dI: 110.5 grams/m2  Ao root diam: 3.5 cm  LA dimension: 4.4 cm  LA/Ao: 1.3  LA Volume (BP): 57.7 ml  LA Volume Index (BP): 31.9 ml/m2  RWT: 0.37  TAPSE: 2.3 cm     Doppler Measurements & Calculations  MV E max prosper: 77.1 cm/sec  MV A max prosper: 108.2 cm/sec  MV E/A: 0.71  MV dec slope: 260.8 cm/sec2  MV dec time: 0.30 sec  E/E' av.6  Lateral E/e': 11.0  Medial E/e': 14.2     ______________________________________________________________________________  Report approved by: Bella Parada 2023 05:12 PM         NM Lexiscan stress test (nuc card)     Value    Target     Baseline Systolic     Baseline Diastolic BP 88    Last Stress Systolic     Last Stress Diastolic BP 68    Baseline HR 53    Max HR  71    Max Predicted HR  55    Rate Pressure Product 8,165.0    Narrative       The nuclear stress test is negative for inducible myocardial ischemia   or infarction.     Left ventricular function is hyperdynamic EF >70%.     There is no prior study for comparison.           Discharge Medications   Current Discharge Medication List      START taking these medications    Details   amLODIPine (NORVASC) 5 MG tablet Take 1 tablet (5 mg) by mouth daily    Associated Diagnoses: Hypertensive urgency      carvedilol (COREG) 25 MG tablet Take 1 tablet (25 mg) by mouth 2 times daily (with meals)    Associated Diagnoses: Hypertensive urgency      cephALEXin (KEFLEX) 500 MG capsule Take 1 capsule (500 mg) by mouth 4 times daily for 10 doses    Associated Diagnoses: Cellulitis of right lower extremity      guaiFENesin (MUCINEX) 600 MG 12 hr tablet Take 1 tablet (600 mg) by mouth 2 times daily for 5 days    Associated Diagnoses: Bronchitis         CONTINUE these medications which have NOT CHANGED    Details   furosemide (LASIX) 20 MG tablet Take 40 mg by mouth daily      meloxicam (MOBIC) 15 MG tablet Take 15 mg by mouth daily      potassium chloride (MICRO-K) 10 MEQ CR  capsule Take 10 mEq by mouth 2 times daily Morning & Noon.      PRAVASTATIN SODIUM PO Take 40 mg by mouth At Bedtime       psyllium (METAMUCIL/KONSYL) Packet Take 1 packet by mouth daily as needed for constipation      VITAMIN D, CHOLECALCIFEROL, PO Take 2,000 Units by mouth daily          STOP taking these medications       cloNIDine (CATAPRES) 0.2 MG tablet Comments:   Reason for Stopping:         magnesium oxide (MAG-OX) 400 MG tablet Comments:   Reason for Stopping:         metoprolol tartrate (LOPRESSOR) 25 MG tablet Comments:   Reason for Stopping:         pantoprazole (PROTONIX) 40 MG EC tablet Comments:   Reason for Stopping:             Allergies   No Known Allergies

## 2023-01-12 NOTE — PROGRESS NOTES
Occupational Therapy Discharge Summary    Reason for therapy discharge:    Discharged to transitional care facility.    Progress towards therapy goal(s). See goals on Care Plan in [facility] electronic health record for goal details.  Goals not met.  Barriers to achieving goals:   discharge from facility.    Therapy recommendation(s):    Continued therapy is recommended.  Rationale/Recommendations:  Pt limited due to B LE edema, impaired balance, strength and activity tolerance and recommend TCU to increase ADL and functional moblity I and safety to PLOF with cont'd wound care and B LE lymphedema mgmt. .

## 2023-01-12 NOTE — PROGRESS NOTES
Repeat blood cultures continue to be negative   Keep off vancomycin   Will sign off please call if ques.     Jake Ching MD  Infectious Disease

## 2023-01-12 NOTE — PLAN OF CARE
Goal Outcome Evaluation:    Orientations: AOx4 hard of hearing  Vitals/Pain:  VSS except hypertensive sometimes.  Otherwise on RA. CMS intact. Denies any nausea and pain this shift.   Lines/Drains: 2 PIV SL   Skin/Wounds: BLE swelling. RLE open ulcers, dressing changed per WOC orders, CDI.   GI/:  Tolerating 2 gm NA diet. Walks to bedside commode, Good UOP. +BM this shift.   Ambulation/Assist: Assist GB/ walker  Plan: Wound consult placed. OT for lymphedema wraps. Plan of care continues.

## 2023-01-12 NOTE — PROGRESS NOTES
Care Management Discharge Note    Discharge Date: 01/13/2023       Discharge Disposition: Transitional Care    Discharge Services: Other (see comment) (rehab)    Discharge DME: None    Discharge Transportation: family or friend will provide    Private pay costs discussed: Not applicable    PAS Confirmation Code: [id number]  Patient/family educated on Medicare website which has current facility and service quality ratings: yes    Education Provided on the Discharge Plan:    Persons Notified of Discharge Plans: Pt, dtr, RN, MD, facility  Patient/Family in Agreement with the Plan: yes    Handoff Referral Completed: No    Additional Information:  Pt will discharge today to [facility] via dtr at 1630. Orders faxed and facility updated. Facility received prior-auth from [employer]. Pt and dtr Briana updated and both in agreement. Nursing aware.         NAVI Bryant, [facility]  [phone] Desk phone  [phone] Cell/text (Preferred)  [facility]        PAS-RR    D: Per DHS regulation, SW completed and submitted PAS-RR to [place] [employer] Direct Connect via the Senior LinkAge Line.  PAS-RR confirmation # is : [id number]      P: Further questions may be directed to Senior LinkAge Line at #[phone], option #4 for PAS-RR staff.

## 2023-01-13 ENCOUNTER — PATIENT OUTREACH (OUTPATIENT)
Dept: CARE COORDINATION | Facility: CLINIC | Age: 88
End: 2023-01-13

## 2023-01-13 NOTE — PLAN OF CARE
Physical Therapy Discharge Summary    Reason for therapy discharge:    Discharged to transitional care facility.    Progress towards therapy goal(s). See goals on Care Plan in [facility] electronic health record for goal details.  Goals not met.  Barriers to achieving goals:   discharge from facility.    Therapy recommendation(s):    Continued therapy is recommended.  Rationale/Recommendations:  Rec cont PT at TCU to progress gait and mobility as able.

## 2023-01-13 NOTE — PROGRESS NOTES
[facility] Care [facility] [place]    Background: Transitional Care Management program identified per system criteria and reviewed by [facility] team for possible outreach.    Assessment: Upon chart review, CCRC Team member will not proceed with patient outreach related to this episode of Transitional Care Management program due to reason below:    Non-[facility] TCU: Patient is not established within [facility] and CCRC team member noted patient discharged to TCU/ARU/LTACH.    Plan: Transitional Care Management episode addressed appropriately per reason noted above.      Tsering Tony  [facility] [place], [facility]    *Connected Care Resource Team does NOT follow patient ongoing. Referrals are identified based on internal discharge reports and the outreach is to ensure patient has an understanding of their discharge instructions.

## 2023-01-14 LAB
BACTERIA BLD CULT: NO GROWTH
BACTERIA BLD CULT: NO GROWTH

## 2024-10-08 ENCOUNTER — HOSPITAL ENCOUNTER (EMERGENCY)
Facility: CLINIC | Age: 89
Discharge: HOME | End: 2024-10-08
Attending: EMERGENCY MEDICINE | Admitting: EMERGENCY MEDICINE
Payer: COMMERCIAL

## 2024-10-08 VITALS
HEART RATE: 59 BPM | HEIGHT: 62 IN | WEIGHT: 183 LBS | SYSTOLIC BLOOD PRESSURE: 143 MMHG | BODY MASS INDEX: 33.68 KG/M2 | TEMPERATURE: 98.4 F | OXYGEN SATURATION: 97 % | DIASTOLIC BLOOD PRESSURE: 91 MMHG | RESPIRATION RATE: 16 BRPM

## 2024-10-08 DIAGNOSIS — R79.89 INCREASE IN SERUM CREATININE FROM PRIOR MEASUREMENT: ICD-10-CM

## 2024-10-08 DIAGNOSIS — L03.115 CELLULITIS OF RIGHT LOWER LEG: ICD-10-CM

## 2024-10-08 LAB
ANION GAP SERPL CALCULATED.3IONS-SCNC: 13 MMOL/L (ref 7–15)
BASOPHILS # BLD AUTO: 0 10E3/UL (ref 0–0.2)
BASOPHILS NFR BLD AUTO: 0 %
BUN SERPL-MCNC: 20.5 MG/DL (ref 8–23)
CALCIUM SERPL-MCNC: 9.9 MG/DL (ref 8.8–10.4)
CHLORIDE SERPL-SCNC: 104 MMOL/L (ref 98–107)
CREAT SERPL-MCNC: 1.32 MG/DL (ref 0.51–0.95)
EGFRCR SERPLBLD CKD-EPI 2021: 37 ML/MIN/1.73M2
EOSINOPHIL # BLD AUTO: 0.2 10E3/UL (ref 0–0.7)
EOSINOPHIL NFR BLD AUTO: 3 %
ERYTHROCYTE [DISTWIDTH] IN BLOOD BY AUTOMATED COUNT: 12.4 % (ref 10–15)
GLUCOSE SERPL-MCNC: 109 MG/DL (ref 70–99)
HCO3 SERPL-SCNC: 22 MMOL/L (ref 22–29)
HCT VFR BLD AUTO: 41.1 % (ref 35–47)
HGB BLD-MCNC: 13.5 G/DL (ref 11.7–15.7)
IMM GRANULOCYTES # BLD: 0 10E3/UL
IMM GRANULOCYTES NFR BLD: 0 %
LYMPHOCYTES # BLD AUTO: 2 10E3/UL (ref 0.8–5.3)
LYMPHOCYTES NFR BLD AUTO: 29 %
MCH RBC QN AUTO: 30.8 PG (ref 26.5–33)
MCHC RBC AUTO-ENTMCNC: 32.8 G/DL (ref 31.5–36.5)
MCV RBC AUTO: 94 FL (ref 78–100)
MONOCYTES # BLD AUTO: 0.8 10E3/UL (ref 0–1.3)
MONOCYTES NFR BLD AUTO: 11 %
NEUTROPHILS # BLD AUTO: 3.9 10E3/UL (ref 1.6–8.3)
NEUTROPHILS NFR BLD AUTO: 56 %
NRBC # BLD AUTO: 0 10E3/UL
NRBC BLD AUTO-RTO: 0 /100
PLATELET # BLD AUTO: 224 10E3/UL (ref 150–450)
POTASSIUM SERPL-SCNC: 4.8 MMOL/L (ref 3.4–5.3)
RBC # BLD AUTO: 4.38 10E6/UL (ref 3.8–5.2)
SODIUM SERPL-SCNC: 139 MMOL/L (ref 135–145)
WBC # BLD AUTO: 6.9 10E3/UL (ref 4–11)

## 2024-10-08 PROCEDURE — 250N000011 HC RX IP 250 OP 636: Performed by: EMERGENCY MEDICINE

## 2024-10-08 PROCEDURE — 80048 BASIC METABOLIC PNL TOTAL CA: CPT | Performed by: EMERGENCY MEDICINE

## 2024-10-08 PROCEDURE — 85025 COMPLETE CBC W/AUTO DIFF WBC: CPT | Performed by: EMERGENCY MEDICINE

## 2024-10-08 PROCEDURE — 96365 THER/PROPH/DIAG IV INF INIT: CPT

## 2024-10-08 PROCEDURE — 99284 EMERGENCY DEPT VISIT MOD MDM: CPT | Mod: 25

## 2024-10-08 PROCEDURE — 80048 BASIC METABOLIC PNL TOTAL CA: CPT | Performed by: INTERNAL MEDICINE

## 2024-10-08 PROCEDURE — 85025 COMPLETE CBC W/AUTO DIFF WBC: CPT | Performed by: INTERNAL MEDICINE

## 2024-10-08 PROCEDURE — 36415 COLL VENOUS BLD VENIPUNCTURE: CPT | Performed by: INTERNAL MEDICINE

## 2024-10-08 RX ORDER — CEFAZOLIN SODIUM 2 G/100ML
2 INJECTION, SOLUTION INTRAVENOUS ONCE
Status: COMPLETED | OUTPATIENT
Start: 2024-10-08 | End: 2024-10-08

## 2024-10-08 RX ADMIN — CEFAZOLIN SODIUM 2 G: 2 INJECTION, SOLUTION INTRAVENOUS at 22:25

## 2024-10-08 ASSESSMENT — ACTIVITIES OF DAILY LIVING (ADL)
ADLS_ACUITY_SCORE: 38

## 2024-10-08 NOTE — ED TRIAGE NOTES
Pt has wound on her right lower leg for the last 2 weeks, and family noted that wound is getting worse.

## 2024-10-09 NOTE — ED PROVIDER NOTES
"  Emergency Department Note      History of Present Illness     Chief Complaint   Wound Infection    HPI   Deborah Higginbotham is a 89 year old female with a history of breast cancer, CHF, hypertension, and hyperlipidemia who presents to the ER for infected wound. Patient's daughter reports the patient falling and tearing the skin of her right lower shin 2-3 weeks ago when she hit it on a basket in the bathroom. Daughter was gone over the weekend and today noted that it is red around it.  She notes that the patient has been falling a lot recently but is getting assessed soon and has discussed it with her PCP.  A balance class is also starting where she lives that she is going to start. No fevers or chills.    Independent Historian   Daughter provided a partial history as detailed above.     Review of External Notes   I reviewed the primary care note from 9/13/24.     Past Medical History     Medical History and Problem List   Breast cancer   Hyperlipidemia   Hypertension  CHF  DDD  OA    Medications   Amlodipine   Carvedilol   Furosemide   Meloxicam   Pravastatin   Bumetanide   Spirandactone   Hydralazine   Tosemide     Surgical History   Breast reconstruction    Hernia repair   Rotator cuff repair   Mastectomy   Physical Exam     Patient Vitals for the past 24 hrs:   BP Temp Temp src Pulse Resp SpO2 Height Weight   10/08/24 2233 -- -- -- -- -- 97 % -- --   10/08/24 1824 -- -- -- -- -- -- 1.575 m (5' 2\") 83 kg (183 lb)   10/08/24 1823 103/64 98.4  F (36.9  C) Oral 95 16 95 % -- --     Physical Exam  Resp:  Non-labored  Neuro:  Alert and cooperative  MSkel:  Moving all extremities  Skin:  Healing skin tear to R anterior shin.  Cellulitis around it extending towards knee, not on thigh.  No lymphangitis.      Diagnostics     Lab Results   Labs Ordered and Resulted from Time of ED Arrival to Time of ED Departure   BASIC METABOLIC PANEL - Abnormal       Result Value    Sodium 139      Potassium 4.8      Chloride 104   "    Carbon Dioxide (CO2) 22      Anion Gap 13      Urea Nitrogen 20.5      Creatinine 1.32 (*)     GFR Estimate 37 (*)     Calcium 9.9      Glucose 109 (*)    CBC WITH PLATELETS AND DIFFERENTIAL    WBC Count 6.9      RBC Count 4.38      Hemoglobin 13.5      Hematocrit 41.1      MCV 94      MCH 30.8      MCHC 32.8      RDW 12.4      Platelet Count 224      % Neutrophils 56      % Lymphocytes 29      % Monocytes 11      % Eosinophils 3      % Basophils 0      % Immature Granulocytes 0      NRBCs per 100 WBC 0      Absolute Neutrophils 3.9      Absolute Lymphocytes 2.0      Absolute Monocytes 0.8      Absolute Eosinophils 0.2      Absolute Basophils 0.0      Absolute Immature Granulocytes 0.0      Absolute NRBCs 0.0         Imaging   No orders to display     Independent Interpretation   None    ED Course      Medications Administered   Medications   ceFAZolin (ANCEF) 2 g in 100 mL D5W intermittent infusion (2 g Intravenous $New Bag 10/8/24 2220)       Discussion of Management   None    ED Course   ED Course as of 10/08/24 2252   e Oct 08, 2024   2139 I obtained the history and examined the patient as noted above.        Additional Documentation  None    Medical Decision Making / Diagnosis     CMS Diagnoses: None    MIPS       None    MDM   Exam is consistent with cellulitis secondary to skin tear.  No symptoms or lab findings of sepsis.  Is not in shock.  Initial dose of antibiotics in the department.  Erythema marked.  Oral antibiotics.  Return if redness is spreading, having fevers, or other concerns.  Daughter will recheck it tomorrow.    Disposition   The patient was discharged.     Diagnosis     ICD-10-CM    1. Cellulitis of right lower leg  L03.115       2. Increase in serum creatinine from prior measurement  R79.89            Discharge Medications   New Prescriptions    CEPHALEXIN (KEFLEX) 250 MG CAPSULE    Take 1 capsule (250 mg) by mouth 4 times daily for 7 days.         Scribe Disclosure:  Jose Antonio BUSTOS am  serving as a scribe at 9:51 PM on 10/8/2024 to document services personally performed by Zoya Box MD based on my observations and the provider's statements to me.        Zoya Box MD  10/08/24 0965

## 2024-12-29 ENCOUNTER — APPOINTMENT (OUTPATIENT)
Dept: GENERAL RADIOLOGY | Facility: CLINIC | Age: 89
End: 2024-12-29
Attending: EMERGENCY MEDICINE
Payer: COMMERCIAL

## 2024-12-29 ENCOUNTER — APPOINTMENT (OUTPATIENT)
Dept: CT IMAGING | Facility: CLINIC | Age: 89
End: 2024-12-29
Attending: EMERGENCY MEDICINE
Payer: COMMERCIAL

## 2024-12-29 ENCOUNTER — HOSPITAL ENCOUNTER (OUTPATIENT)
Facility: CLINIC | Age: 89
Setting detail: OBSERVATION
End: 2024-12-29
Attending: EMERGENCY MEDICINE | Admitting: HOSPITALIST
Payer: COMMERCIAL

## 2024-12-29 DIAGNOSIS — S22.42XA CLOSED FRACTURE OF MULTIPLE RIBS OF LEFT SIDE, INITIAL ENCOUNTER: ICD-10-CM

## 2024-12-29 LAB
ANION GAP SERPL CALCULATED.3IONS-SCNC: 12 MMOL/L (ref 7–15)
BASOPHILS # BLD AUTO: 0 10E3/UL (ref 0–0.2)
BASOPHILS NFR BLD AUTO: 0 %
BUN SERPL-MCNC: 26.3 MG/DL (ref 8–23)
CALCIUM SERPL-MCNC: 9.9 MG/DL (ref 8.8–10.4)
CHLORIDE SERPL-SCNC: 101 MMOL/L (ref 98–107)
CREAT SERPL-MCNC: 1.19 MG/DL (ref 0.51–0.95)
EGFRCR SERPLBLD CKD-EPI 2021: 42 ML/MIN/1.73M2
EOSINOPHIL # BLD AUTO: 0.1 10E3/UL (ref 0–0.7)
EOSINOPHIL NFR BLD AUTO: 1 %
ERYTHROCYTE [DISTWIDTH] IN BLOOD BY AUTOMATED COUNT: 12.9 % (ref 10–15)
GLUCOSE SERPL-MCNC: 123 MG/DL (ref 70–99)
HCO3 SERPL-SCNC: 24 MMOL/L (ref 22–29)
HCT VFR BLD AUTO: 40.4 % (ref 35–47)
HGB BLD-MCNC: 13.6 G/DL (ref 11.7–15.7)
IMM GRANULOCYTES # BLD: 0 10E3/UL
IMM GRANULOCYTES NFR BLD: 0 %
LYMPHOCYTES # BLD AUTO: 1.3 10E3/UL (ref 0.8–5.3)
LYMPHOCYTES NFR BLD AUTO: 19 %
MCH RBC QN AUTO: 31.3 PG (ref 26.5–33)
MCHC RBC AUTO-ENTMCNC: 33.7 G/DL (ref 31.5–36.5)
MCV RBC AUTO: 93 FL (ref 78–100)
MONOCYTES # BLD AUTO: 0.7 10E3/UL (ref 0–1.3)
MONOCYTES NFR BLD AUTO: 10 %
NEUTROPHILS # BLD AUTO: 4.9 10E3/UL (ref 1.6–8.3)
NEUTROPHILS NFR BLD AUTO: 69 %
NRBC # BLD AUTO: 0 10E3/UL
NRBC BLD AUTO-RTO: 0 /100
PLATELET # BLD AUTO: 212 10E3/UL (ref 150–450)
POTASSIUM SERPL-SCNC: 4.4 MMOL/L (ref 3.4–5.3)
RBC # BLD AUTO: 4.34 10E6/UL (ref 3.8–5.2)
SODIUM SERPL-SCNC: 137 MMOL/L (ref 135–145)
WBC # BLD AUTO: 7 10E3/UL (ref 4–11)

## 2024-12-29 PROCEDURE — 250N000013 HC RX MED GY IP 250 OP 250 PS 637: Performed by: EMERGENCY MEDICINE

## 2024-12-29 PROCEDURE — 71101 X-RAY EXAM UNILAT RIBS/CHEST: CPT | Mod: LT

## 2024-12-29 PROCEDURE — 85025 COMPLETE CBC W/AUTO DIFF WBC: CPT | Performed by: EMERGENCY MEDICINE

## 2024-12-29 PROCEDURE — 84132 ASSAY OF SERUM POTASSIUM: CPT | Performed by: EMERGENCY MEDICINE

## 2024-12-29 PROCEDURE — G0378 HOSPITAL OBSERVATION PER HR: HCPCS

## 2024-12-29 PROCEDURE — 85018 HEMOGLOBIN: CPT | Performed by: EMERGENCY MEDICINE

## 2024-12-29 PROCEDURE — 80048 BASIC METABOLIC PNL TOTAL CA: CPT | Performed by: EMERGENCY MEDICINE

## 2024-12-29 PROCEDURE — 36415 COLL VENOUS BLD VENIPUNCTURE: CPT | Performed by: EMERGENCY MEDICINE

## 2024-12-29 PROCEDURE — 99291 CRITICAL CARE FIRST HOUR: CPT | Mod: 25

## 2024-12-29 PROCEDURE — 71250 CT THORAX DX C-: CPT

## 2024-12-29 PROCEDURE — 250N000013 HC RX MED GY IP 250 OP 250 PS 637: Performed by: HOSPITALIST

## 2024-12-29 PROCEDURE — 99222 1ST HOSP IP/OBS MODERATE 55: CPT | Performed by: HOSPITALIST

## 2024-12-29 RX ORDER — SPIRONOLACTONE 25 MG/1
25 TABLET ORAL DAILY
COMMUNITY

## 2024-12-29 RX ORDER — ACETAMINOPHEN 500 MG
1000 TABLET ORAL ONCE
Status: COMPLETED | OUTPATIENT
Start: 2024-12-29 | End: 2024-12-29

## 2024-12-29 RX ORDER — ONDANSETRON 2 MG/ML
4 INJECTION INTRAMUSCULAR; INTRAVENOUS EVERY 6 HOURS PRN
Status: DISCONTINUED | OUTPATIENT
Start: 2024-12-29 | End: 2025-01-03 | Stop reason: HOSPADM

## 2024-12-29 RX ORDER — BUMETANIDE 2 MG/1
2 TABLET ORAL DAILY
COMMUNITY

## 2024-12-29 RX ORDER — BUMETANIDE 1 MG/1
2 TABLET ORAL DAILY
Status: DISCONTINUED | OUTPATIENT
Start: 2024-12-30 | End: 2025-01-01

## 2024-12-29 RX ORDER — AMOXICILLIN 250 MG
2 CAPSULE ORAL 2 TIMES DAILY PRN
Status: DISCONTINUED | OUTPATIENT
Start: 2024-12-29 | End: 2025-01-03 | Stop reason: HOSPADM

## 2024-12-29 RX ORDER — OXYCODONE HYDROCHLORIDE 5 MG/1
5 TABLET ORAL EVERY 4 HOURS PRN
Status: DISCONTINUED | OUTPATIENT
Start: 2024-12-29 | End: 2025-01-03 | Stop reason: HOSPADM

## 2024-12-29 RX ORDER — SPIRONOLACTONE 25 MG/1
25 TABLET ORAL DAILY
Status: DISCONTINUED | OUTPATIENT
Start: 2024-12-30 | End: 2025-01-03 | Stop reason: HOSPADM

## 2024-12-29 RX ORDER — ACETAMINOPHEN 325 MG/1
650 TABLET ORAL EVERY 4 HOURS PRN
Status: DISCONTINUED | OUTPATIENT
Start: 2024-12-29 | End: 2024-12-30

## 2024-12-29 RX ORDER — NALOXONE HYDROCHLORIDE 0.4 MG/ML
0.4 INJECTION, SOLUTION INTRAMUSCULAR; INTRAVENOUS; SUBCUTANEOUS
Status: DISCONTINUED | OUTPATIENT
Start: 2024-12-29 | End: 2025-01-03 | Stop reason: HOSPADM

## 2024-12-29 RX ORDER — NALOXONE HYDROCHLORIDE 0.4 MG/ML
0.2 INJECTION, SOLUTION INTRAMUSCULAR; INTRAVENOUS; SUBCUTANEOUS
Status: DISCONTINUED | OUTPATIENT
Start: 2024-12-29 | End: 2025-01-03 | Stop reason: HOSPADM

## 2024-12-29 RX ORDER — SENNOSIDES A AND B 8.6 MG/1
2 TABLET, FILM COATED ORAL AT BEDTIME
COMMUNITY

## 2024-12-29 RX ORDER — MORPHINE SULFATE 2 MG/ML
2 INJECTION, SOLUTION INTRAMUSCULAR; INTRAVENOUS EVERY 10 MIN PRN
Status: DISCONTINUED | OUTPATIENT
Start: 2024-12-29 | End: 2024-12-29

## 2024-12-29 RX ORDER — ONDANSETRON 4 MG/1
4 TABLET, ORALLY DISINTEGRATING ORAL EVERY 6 HOURS PRN
Status: DISCONTINUED | OUTPATIENT
Start: 2024-12-29 | End: 2025-01-03 | Stop reason: HOSPADM

## 2024-12-29 RX ORDER — PROCHLORPERAZINE MALEATE 5 MG/1
5 TABLET ORAL EVERY 6 HOURS PRN
Status: DISCONTINUED | OUTPATIENT
Start: 2024-12-29 | End: 2025-01-03 | Stop reason: HOSPADM

## 2024-12-29 RX ORDER — AMOXICILLIN 250 MG
1 CAPSULE ORAL 2 TIMES DAILY PRN
Status: DISCONTINUED | OUTPATIENT
Start: 2024-12-29 | End: 2025-01-03 | Stop reason: HOSPADM

## 2024-12-29 RX ORDER — HYDROCODONE BITARTRATE AND ACETAMINOPHEN 5; 325 MG/1; MG/1
1 TABLET ORAL ONCE
Status: COMPLETED | OUTPATIENT
Start: 2024-12-29 | End: 2024-12-29

## 2024-12-29 RX ORDER — CARVEDILOL 25 MG/1
25 TABLET ORAL 2 TIMES DAILY WITH MEALS
Status: DISCONTINUED | OUTPATIENT
Start: 2024-12-29 | End: 2025-01-03 | Stop reason: HOSPADM

## 2024-12-29 RX ORDER — ACETAMINOPHEN 650 MG/1
650 SUPPOSITORY RECTAL EVERY 4 HOURS PRN
Status: DISCONTINUED | OUTPATIENT
Start: 2024-12-29 | End: 2024-12-30

## 2024-12-29 RX ADMIN — ACETAMINOPHEN 650 MG: 325 TABLET, FILM COATED ORAL at 21:58

## 2024-12-29 RX ADMIN — CARVEDILOL 25 MG: 25 TABLET, FILM COATED ORAL at 21:58

## 2024-12-29 RX ADMIN — HYDROCODONE BITARTRATE AND ACETAMINOPHEN 1 TABLET: 5; 325 TABLET ORAL at 19:04

## 2024-12-29 RX ADMIN — ACETAMINOPHEN 1000 MG: 500 TABLET, FILM COATED ORAL at 14:03

## 2024-12-29 ASSESSMENT — ACTIVITIES OF DAILY LIVING (ADL)
ADLS_ACUITY_SCORE: 50
ADLS_ACUITY_SCORE: 54
ADLS_ACUITY_SCORE: 50
ADLS_ACUITY_SCORE: 54

## 2024-12-29 NOTE — ED NOTES
"[facility]  ED Nurse Handoff Report    ED Chief complaint: Fall      ED Diagnosis:   Final diagnoses:   None       Code Status: not addressed     Allergies: No Known Allergies    Patient Story: Pt presents to ed to be evaluated for a fall.   Yesterday morning, patient lost her balance and fell in the bathroom. Pt fell sideways and landed on her L side tub. Pt denies LOC or hitting head. Pt pressed her life alert.  Pt reports severe rib pain, that is worse with a deep breath. Pt denies being on a blood thinner.   Focused Assessment:  left yamel rib pain,    Treatments and/or interventions provided: tylenol   Patient's response to treatments and/or interventions:     To be done/followed up on inpatient unit:  unknown     Does this patient have any cognitive concerns?:  none     Activity level - Baseline/Home:  walker  Activity Level - Current:   Unknown    Patient's Preferred language: English   Needed?: No    Isolation: None  Infection: Not Applicable  Patient tested for COVID 19 prior to admission: NO  Bariatric?: No    Vital Signs:   Vitals:    12/29/24 1352   BP: (!) 159/81   Pulse: 57   Resp: 16   Temp: 97  F (36.1  C)   TempSrc: Temporal   SpO2: 98%   Weight: 82.6 kg (182 lb)   Height: 1.575 m (5' 2\")       Cardiac Rhythm:     Was the PSS-3 completed:   Yes  What interventions are required if any?               Family Comments:   OBS brochure/video discussed/provided to patient/family:               Name of person given brochure if not patient:               Relationship to patient:     For the majority of the shift this patient's behavior was    Behavioral interventions performed were .    ED NURSE PHONE NUMBER: [phone]        "

## 2024-12-29 NOTE — ED TRIAGE NOTES
Pt presents to ed to be evaluated for a fall.   Yesterday morning, patient lost her balance and fell in the bathroom. Pt fell sideways and landed on her L side tub. Pt denies LOC or hitting head. Pt pressed her life alert.  Pt reports severe rib pain, that is worse with a deep breath. Pt denies being on a blood thinner.      Triage Assessment (Adult)       Row Name 12/29/24 1218          Triage Assessment    Airway WDL WDL        Respiratory WDL    Respiratory WDL WDL        Peripheral/Neurovascular WDL    Peripheral Neurovascular WDL WDL

## 2024-12-29 NOTE — PHARMACY-ADMISSION MEDICATION HISTORY
Pharmacist Admission Medication History    Admission medication history is complete. The information provided in this note is only as accurate as the sources available at the time of the update.    Information Source(s): Patient, Family member, and Clinic records via in-person    Pertinent Information:     Changes made to PTA medication list:  Added: bumex, spironolactone, senna  Deleted: amlodipine, lasix, mobic, psyllium  Changed: None    Allergies reviewed with patient and updates made in EHR: no    Medication History Completed By: Akil Aliheyder, RPH 12/29/2024 4:10 PM    PTA Med List   Medication Sig Last Dose/Taking    bumetanide (BUMEX) 2 MG tablet Take 2 mg by mouth daily. 12/29/2024 Morning    carvedilol (COREG) 25 MG tablet Take 1 tablet (25 mg) by mouth 2 times daily (with meals) 12/29/2024 Morning    potassium chloride (MICRO-K) 10 MEQ CR capsule Take 10 mEq by mouth 2 times daily Morning & Noon. 12/29/2024 Morning    PRAVASTATIN SODIUM PO Take 40 mg by mouth At Bedtime  12/28/2024 Bedtime    senna (SENOKOT) 8.6 MG tablet Take 2 tablets by mouth at bedtime. 12/28/2024    spironolactone (ALDACTONE) 25 MG tablet Take 25 mg by mouth daily. 12/29/2024 Morning    VITAMIN D, CHOLECALCIFEROL, PO Take 2,000 Units by mouth daily  12/29/2024 Morning

## 2024-12-29 NOTE — ED PROVIDER NOTES
"  Emergency Department Note      History of Present Illness     Chief Complaint   Fall      HPI   Deborah Higginbotham is a 89 year old female with past medical history significant for HTN, CHF and breast cancer in remission here for evaluation after a fall.  She states that she simply lost balance in her bathroom on some loose rugs, falling into her bathtub.  She struck her left chest wall, but denies striking her head or suffering a loss of consciousness.  She was able to get through the day yesterday, but the pain was much worse today, making it very difficult to get out of bed or move significantly.  She rates her pain at 3 out of 10 at rest, 10 out of 10 with movement.  She also notes that hurts to take a deep breath.  She denies other complaints at this juncture.    Independent Historian   Daughter as detailed above.    Review of External Notes   None    Past Medical History     Medical History and Problem List   HTN  SBO   Bronchitis   Cellulitis right lower extremity   Hypercholesteremia   Breast cancer   Osteoarthritis of knees and spine   DDD  CHF  Smoking   Chronic edema of lower extremities     Medications   Carvedilol   Bumex   Potassium chloride   Pravastatin sodium   Senokot   Aldactone       Surgical History   Hernia repair   Rotator cuff repair   Modified radical mastectomy   Breast reconstruction    Physical Exam     Patient Vitals for the past 24 hrs:   BP Temp Temp src Pulse Resp SpO2 Height Weight   12/29/24 1801 (!) 151/84 -- -- 74 19 94 % -- --   12/29/24 1654 127/87 -- -- 78 (!) 34 -- -- --   12/29/24 1554 (!) 142/88 -- -- 61 (!) 33 96 % -- --   12/29/24 1453 (!) 171/89 -- -- -- -- -- -- --   12/29/24 1352 (!) 159/81 97  F (36.1  C) Temporal 57 16 98 % 1.575 m (5' 2\") 82.6 kg (182 lb)     Physical Exam  Eye:  Pupils are equal, round, and reactive.  Extraocular movements intact.    ENT:  No rhinorrhea.  Moist mucus membranes.  Normal tongue and tonsil.    Cardiac:  Regular rate and rhythm.  No " murmurs, gallops, or rubs.    Pulmonary:  Clear to auscultation bilaterally.  No wheezes, rales, or rhonchi.    Abdomen:  Positive bowel sounds.  Abdomen is soft and non-distended, without focal tenderness.    Musculoskeletal:  Normal movement of all extremities without evidence for deficit.  No midline tenderness to the neck or back.  Focal tenderness along the entire left rib cage without crepitus.    Skin:  Warm and dry without rashes.    Neurologic: Cranial nerves II through XII intact.  Non-focal exam without asymmetric weakness or numbness.     Psychiatric:  Normal affect with appropriate interaction with examiner.      Diagnostics     Lab Results   Labs Ordered and Resulted from Time of ED Arrival to Time of ED Departure   BASIC METABOLIC PANEL - Abnormal       Result Value    Sodium 137      Potassium 4.4      Chloride 101      Carbon Dioxide (CO2) 24      Anion Gap 12      Urea Nitrogen 26.3 (*)     Creatinine 1.19 (*)     GFR Estimate 42 (*)     Calcium 9.9      Glucose 123 (*)    CBC WITH PLATELETS AND DIFFERENTIAL    WBC Count 7.0      RBC Count 4.34      Hemoglobin 13.6      Hematocrit 40.4      MCV 93      MCH 31.3      MCHC 33.7      RDW 12.9      Platelet Count 212      % Neutrophils 69      % Lymphocytes 19      % Monocytes 10      % Eosinophils 1      % Basophils 0      % Immature Granulocytes 0      NRBCs per 100 WBC 0      Absolute Neutrophils 4.9      Absolute Lymphocytes 1.3      Absolute Monocytes 0.7      Absolute Eosinophils 0.1      Absolute Basophils 0.0      Absolute Immature Granulocytes 0.0      Absolute NRBCs 0.0         Imaging   Chest CT w/o contrast   Final Result   IMPRESSION:    1.  Subtle nondisplaced fractures involving the left fourth and left 10th ribs.   2.  Several old bilateral rib fractures and old sternal fracture.   3.  Scattered fibrotic changes and subsegmental atelectasis both lungs, otherwise no acute pulmonary disease.   4.  Severe atherosclerotic disease.          Ribs XR, unilat 3 views + PA chest,  left   Final Result   IMPRESSION: Acute mildly displaced fractures of the anterior left fifth, sixth, seventh, eighth, and ninth ribs. Question trace left pleural fluid. No significant pneumothorax. Bones are demineralized. Thoracolumbar kyphoscoliosis. Degenerative changes    in the spine. Cardiomegaly. Tortuous and atherosclerotic thoracic aorta. Hyperinflated lungs compatible with underlying COPD. Extensive surgical clips in the left chest wall.             Independent Interpretation   CXR: Yes evidence of left-sided rib fractures noted..    ED Course      Medications Administered   Medications   sodium chloride (PF) 0.9% PF flush 3 mL (has no administration in time range)   sodium chloride (PF) 0.9% PF flush 3 mL (has no administration in time range)   HYDROcodone-acetaminophen (NORCO) 5-325 MG per tablet 1 tablet (has no administration in time range)   acetaminophen (TYLENOL) tablet 1,000 mg (1,000 mg Oral $Given 12/29/24 1403)       Procedures   Procedures     Discussion of Management   Admitting Hospitalist, Dr. Alvarenga    ED Course   ED Course as of 12/29/24 1857   Sun Dec 29, 2024   1500 I obtained history and examined the patient as noted above.    1816 I spoke with Dr. Alvarenga, hospitalist, who accepted the patient for admission.    1841 I rechecked and updated the patient.        Additional Documentation  None    Medical Decision Making / Diagnosis     CMS Diagnoses: None    MIPS       None    [facility]   Deborah Higginbotham is a 89 year old female presenting after suffering a fall into a bathtub onto her left side.  X-ray showed evidence of multiple fractures.  This was confirmed on CT with fractures from the fourth rib through the 10th rib on the left side without evidence of pneumothorax or other more serious intrathoracic pathology.  The remainder of her trauma exam is normal.  Labs are reassuring.  However, an elderly woman with 7 rib fractures, it is prudent for admission  to the hospital for close observation and assistance with pulmonary toilet and pain control.  I spoke with hospital medicine who agrees to admit the patient under observation status    Disposition   The patient was admitted to the hospital.     Diagnosis     ICD-10-CM    1. Closed fracture of multiple ribs of left side, initial encounter  S22.42XA           Scribe Disclosure:  I, Shannan Chidi, am serving as a scribe at 6:00 PM on 12/29/2024 to document services personally performed by Trierweiler, Chad A, MD based on my observations and the provider's statements to me.        Trierweiler, Chad A, MD  12/30/24 0109

## 2024-12-30 LAB
ANION GAP SERPL CALCULATED.3IONS-SCNC: 13 MMOL/L (ref 7–15)
BASOPHILS # BLD AUTO: 0 10E3/UL (ref 0–0.2)
BASOPHILS NFR BLD AUTO: 0 %
BUN SERPL-MCNC: 27.6 MG/DL (ref 8–23)
CALCIUM SERPL-MCNC: 9.7 MG/DL (ref 8.8–10.4)
CHLORIDE SERPL-SCNC: 103 MMOL/L (ref 98–107)
CREAT SERPL-MCNC: 1.1 MG/DL (ref 0.51–0.95)
EGFRCR SERPLBLD CKD-EPI 2021: 47 ML/MIN/1.73M2
EOSINOPHIL # BLD AUTO: 0.1 10E3/UL (ref 0–0.7)
EOSINOPHIL NFR BLD AUTO: 1 %
ERYTHROCYTE [DISTWIDTH] IN BLOOD BY AUTOMATED COUNT: 13.1 % (ref 10–15)
GLUCOSE SERPL-MCNC: 139 MG/DL (ref 70–99)
HCO3 SERPL-SCNC: 21 MMOL/L (ref 22–29)
HCT VFR BLD AUTO: 39.8 % (ref 35–47)
HGB BLD-MCNC: 13.7 G/DL (ref 11.7–15.7)
IMM GRANULOCYTES # BLD: 0 10E3/UL
IMM GRANULOCYTES NFR BLD: 0 %
LYMPHOCYTES # BLD AUTO: 1.4 10E3/UL (ref 0.8–5.3)
LYMPHOCYTES NFR BLD AUTO: 17 %
MCH RBC QN AUTO: 32.2 PG (ref 26.5–33)
MCHC RBC AUTO-ENTMCNC: 34.4 G/DL (ref 31.5–36.5)
MCV RBC AUTO: 94 FL (ref 78–100)
MONOCYTES # BLD AUTO: 0.9 10E3/UL (ref 0–1.3)
MONOCYTES NFR BLD AUTO: 11 %
NEUTROPHILS # BLD AUTO: 5.5 10E3/UL (ref 1.6–8.3)
NEUTROPHILS NFR BLD AUTO: 70 %
NRBC # BLD AUTO: 0 10E3/UL
NRBC BLD AUTO-RTO: 0 /100
PLATELET # BLD AUTO: 218 10E3/UL (ref 150–450)
POTASSIUM SERPL-SCNC: 4.1 MMOL/L (ref 3.4–5.3)
RBC # BLD AUTO: 4.25 10E6/UL (ref 3.8–5.2)
SODIUM SERPL-SCNC: 137 MMOL/L (ref 135–145)
WBC # BLD AUTO: 7.9 10E3/UL (ref 4–11)

## 2024-12-30 PROCEDURE — 250N000013 HC RX MED GY IP 250 OP 250 PS 637: Performed by: HOSPITALIST

## 2024-12-30 PROCEDURE — 99232 SBSQ HOSP IP/OBS MODERATE 35: CPT | Performed by: HOSPITALIST

## 2024-12-30 PROCEDURE — 85025 COMPLETE CBC W/AUTO DIFF WBC: CPT | Performed by: HOSPITALIST

## 2024-12-30 PROCEDURE — 999N000128 HC STATISTIC PERIPHERAL IV START W/O US GUIDANCE

## 2024-12-30 PROCEDURE — 99222 1ST HOSP IP/OBS MODERATE 55: CPT | Performed by: THORACIC SURGERY (CARDIOTHORACIC VASCULAR SURGERY)

## 2024-12-30 PROCEDURE — 80048 BASIC METABOLIC PNL TOTAL CA: CPT | Performed by: HOSPITALIST

## 2024-12-30 PROCEDURE — G0378 HOSPITAL OBSERVATION PER HR: HCPCS

## 2024-12-30 PROCEDURE — 36415 COLL VENOUS BLD VENIPUNCTURE: CPT | Performed by: HOSPITALIST

## 2024-12-30 RX ORDER — IBUPROFEN 600 MG/1
600 TABLET, FILM COATED ORAL EVERY 6 HOURS
Status: DISCONTINUED | OUTPATIENT
Start: 2024-12-31 | End: 2024-12-31

## 2024-12-30 RX ORDER — ACETAMINOPHEN 325 MG/1
975 TABLET ORAL EVERY 6 HOURS
Status: DISCONTINUED | OUTPATIENT
Start: 2024-12-31 | End: 2025-01-03 | Stop reason: HOSPADM

## 2024-12-30 RX ORDER — LIDOCAINE 4 G/G
2 PATCH TOPICAL
Status: DISCONTINUED | OUTPATIENT
Start: 2024-12-31 | End: 2024-12-30

## 2024-12-30 RX ORDER — SENNOSIDES 8.6 MG
2 TABLET ORAL AT BEDTIME
Status: DISCONTINUED | OUTPATIENT
Start: 2024-12-30 | End: 2025-01-03 | Stop reason: HOSPADM

## 2024-12-30 RX ORDER — POTASSIUM CHLORIDE 750 MG/1
10 TABLET, EXTENDED RELEASE ORAL 2 TIMES DAILY
Status: DISCONTINUED | OUTPATIENT
Start: 2024-12-30 | End: 2025-01-03 | Stop reason: HOSPADM

## 2024-12-30 RX ORDER — ACETAMINOPHEN 325 MG/1
650 TABLET ORAL EVERY 12 HOURS PRN
Status: DISCONTINUED | OUTPATIENT
Start: 2024-12-30 | End: 2025-01-03 | Stop reason: HOSPADM

## 2024-12-30 RX ORDER — POTASSIUM CHLORIDE 750 MG/1
10 CAPSULE, EXTENDED RELEASE ORAL 2 TIMES DAILY
Status: DISCONTINUED | OUTPATIENT
Start: 2024-12-30 | End: 2024-12-30

## 2024-12-30 RX ORDER — METHOCARBAMOL 500 MG/1
500 TABLET, FILM COATED ORAL 3 TIMES DAILY
Status: DISCONTINUED | OUTPATIENT
Start: 2024-12-31 | End: 2025-01-02

## 2024-12-30 RX ORDER — LIDOCAINE 4 G/G
2 PATCH TOPICAL
Status: DISCONTINUED | OUTPATIENT
Start: 2024-12-30 | End: 2025-01-03 | Stop reason: HOSPADM

## 2024-12-30 RX ORDER — ACETAMINOPHEN 325 MG/1
975 TABLET ORAL EVERY 8 HOURS
Status: DISCONTINUED | OUTPATIENT
Start: 2024-12-30 | End: 2024-12-30

## 2024-12-30 RX ADMIN — POTASSIUM CHLORIDE 10 MEQ: 750 TABLET, EXTENDED RELEASE ORAL at 12:49

## 2024-12-30 RX ADMIN — CARVEDILOL 25 MG: 25 TABLET, FILM COATED ORAL at 18:07

## 2024-12-30 RX ADMIN — ACETAMINOPHEN 650 MG: 325 TABLET, FILM COATED ORAL at 08:27

## 2024-12-30 RX ADMIN — ACETAMINOPHEN 975 MG: 325 TABLET, FILM COATED ORAL at 22:13

## 2024-12-30 RX ADMIN — CARVEDILOL 25 MG: 25 TABLET, FILM COATED ORAL at 08:23

## 2024-12-30 RX ADMIN — ACETAMINOPHEN 975 MG: 325 TABLET, FILM COATED ORAL at 14:34

## 2024-12-30 RX ADMIN — SENNOSIDES 2 TABLET: 8.6 TABLET, FILM COATED ORAL at 22:13

## 2024-12-30 RX ADMIN — OXYCODONE HYDROCHLORIDE 2.5 MG: 5 TABLET ORAL at 11:24

## 2024-12-30 RX ADMIN — BUMETANIDE 2 MG: 1 TABLET ORAL at 08:22

## 2024-12-30 RX ADMIN — SPIRONOLACTONE 25 MG: 25 TABLET ORAL at 08:23

## 2024-12-30 RX ADMIN — LIDOCAINE 2 PATCH: 4 PATCH TOPICAL at 12:49

## 2024-12-30 ASSESSMENT — ACTIVITIES OF DAILY LIVING (ADL)
ADLS_ACUITY_SCORE: 50
ADLS_ACUITY_SCORE: 49
ADLS_ACUITY_SCORE: 50
ADLS_ACUITY_SCORE: 49
ADLS_ACUITY_SCORE: 50
ADLS_ACUITY_SCORE: 49
ADLS_ACUITY_SCORE: 50
ADLS_ACUITY_SCORE: 49
ADLS_ACUITY_SCORE: 50
ADLS_ACUITY_SCORE: 50
ADLS_ACUITY_SCORE: 49
ADLS_ACUITY_SCORE: 50
ADLS_ACUITY_SCORE: 50
ADLS_ACUITY_SCORE: 49
ADLS_ACUITY_SCORE: 50
ADLS_ACUITY_SCORE: 50

## 2024-12-30 NOTE — PLAN OF CARE
Goal Outcome Evaluation:    Observation goals  PRIOR TO DISCHARGE         Comments:   -diagnostic tests and consults completed and resulted- Not met, thoracic consult, PT/OT pending      -vital signs normal or at patient baseline- Met    -adequate pain control on oral analgesics- Not met     -safe disposition plan has been identified- not met, consult pending    Nurse to notify provider when observation goals have been met and patient is ready for discharge.

## 2024-12-30 NOTE — PLAN OF CARE
Goal Outcome Evaluation:    Observation goals  PRIOR TO DISCHARGE         Comments:   -diagnostic tests and consults completed and resulted- Not met, thoracic consult pending      -vital signs normal or at patient baseline- Met    -adequate pain control on oral analgesics- Not met     -safe disposition plan has been identified- not met, consult pending    Nurse to notify provider when observation goals have been met and patient is ready for discharge.

## 2024-12-30 NOTE — PROGRESS NOTES
Observation goals  PRIOR TO DISCHARGE       Comments: -diagnostic tests and consults completed and resulted- not met, thoracic surgery consult pending  -vital signs normal or at patient baseline- met  -adequate pain control on oral analgesics- met  -safe disposition plan has been identified- not met, consult pending  Nurse to notify provider when observation goals have been met and patient is ready for discharge.

## 2024-12-30 NOTE — PLAN OF CARE
Summary: 2100-2330 12/29/24 fall, L rib pain  Orientation: A/Ox4 forgetful   Observation Goals (met & not met): not met  Activity Level: Ax1 GB/W  Fall Risk: yes  Behavior & Aggression Tool Color: green  Pain Management: PRN Tylenol given for rib pain  ABNL VS/O2: VSS on RA  ABNL Lab/BG: n/a  Diet: reg  Bowel/Bladder: continent   Drains/Devices: no IV   Skin: scattered bruising, redness on bottom  Tests/Procedures for next shift: n/a  Anticipated DC date: TBD  Other Important Info:

## 2024-12-30 NOTE — PROGRESS NOTES
RECEIVING UNIT ED HANDOFF REVIEW    ED Nurse Handoff Report was reviewed by: Rupinder Huffman RN on December 29, 2024 at 8:52 PM

## 2024-12-30 NOTE — PROGRESS NOTES
[facility]    Medicine Progress Note - Hospitalist Service    Date of Admission:  12/29/2024    Assessment & Plan     This is a 89-year-old female with medical history which is significant for hypertension who lives at assisted facility and presented to the ED on 12/29/2024 with chief complaint of fall likely mechanical and was diagnosed with multiple rib fractures after she complained of left-sided chest pain and admitted to the hospital on 12/29/2024    Subtle nondisplaced fractures involving the left fourth and left 10th ribs.   Likely mechanical fall  -On admission presented likely after a mechanical fall and was complaining of pain over the left side of the chest worsened with inspiration  -On presentation to the ER blood pressure of 159/81 and other vitals were stable and labs were pertinent for creatinine of 1.19  -Chest x-ray showed  Acute mildly displaced fractures of the anterior left fifth, sixth, seventh, eighth, and ninth ribs. Question trace left pleural fluid. No significant pneumothorax. Bones are demineralized. Thoracolumbar kyphoscoliosis   -CT chest revealed subtle nondisplaced fracture involving the left fourth and 10th rib and several old bilateral rib fractures and old sternal fracture  -Continue with pain control and have added lidocaine patch, made Tylenol scheduled 975 every 8 hours, as needed Tylenol available with limit to not exceed 4 g and also as needed oxycodone  -Incentive spirometer  -Thoracic surgery consult  -PT OT consulted      Hypertension  -Continue with PTA Coreg, bumex and spironolactone and potassium    HLD  - hold statin given obs status    Family communication  -I did discuss plan of care in detail with patient's granddaughter Rayray and she took some notes and will be conveying the information for the family members.       Observation Goals: -diagnostic tests and consults completed and resulted, -vital signs normal or at patient baseline, -adequate  "pain control on oral analgesics, -safe disposition plan has been identified, Nurse to notify provider when observation goals have been met and patient is ready for discharge.  Diet: Regular Diet Adult    DVT Prophylaxis: Pneumatic Compression Devices  Ferraro Catheter: Not present  Lines: None     Cardiac Monitoring: None  Code Status: No CPR- Do NOT Intubate      Clinically Significant Risk Factors Present on Admission                   # Hypertension: Noted on problem list           # Obesity: Estimated body mass index is 33.29 kg/m  as calculated from the following:    Height as of this encounter: 1.575 m (5' 2\").    Weight as of this encounter: 82.6 kg (182 lb).              Social Drivers of Health    Housing Stability: High Risk (12/29/2024)    Housing Stability     Do you have housing? : No     Are you worried about losing your housing?: No   Tobacco Use: Medium Risk (11/1/2024)    Received from [facility]    Patient History     Smoking Tobacco Use: Former     Smokeless Tobacco Use: Never    Received from [facility]    Social Connections          Disposition Plan     Medically Ready for Discharge: Anticipated in 2-4 Days, pain control and thoracic surgery sammy Lara MD  Hospitalist Service  [facility]  Securely message with [employer] (more info)  Text page via [employer] Paging/Directory     This note was completed in part using dictation via the Dragon voice recognition software. Some word and grammatical errors may occur and must be interpreted in the appropriate clinical context. If there are any questions pertaining to this issue, please contact me for further clarification.    ______________________________________________________________________    Interval History     -Seen patient and she was sitting in the chair and mentioned that she is having a lot of pain in spite of taking oxycodone.  " Discussed with patient and granddaughter that pain medications have been adjusted  -Currently lives alone and mentioned that she has fallen before  -Discussed with RN and they will encourage incentive spirometer    Physical Exam   Vital Signs: Temp: 97.9  F (36.6  C) Temp src: Oral BP: 132/65 Pulse: 69   Resp: 18 SpO2: 94 % O2 Device: None (Room air)    Weight: 182 lbs 0 oz        General: AOx3  HEENT: Head is atraumatic, normocephalic.    Neck: Neck is supple   Respiratory: CTA LA  Cardiovascular: Regular rate , S1 and S2 normal with no murmer or rubs or gallops  Abdomen:   soft , non tender , non distended and bowel sound present   Skin: No skin rashes or lesions to inspection or palpation.  Neurologic: No focal deficits  Musculoskeletal: Normal Range of motion over upper and lower extremities bilaterally   Psychiatric: cooperative      Medical Decision Making       Time spent in care of patient is 45 minutes and I reviewed labs including CBC and basic metabolic panel and discussed plan of care in detail with the patient, nursing staff, family    And also with primary RN  Data     I have personally reviewed the following data over the past 24 hrs:    7.0  \   13.6   / 212     137 101 26.3 (H) /  123 (H)   4.4 24 1.19 (H) \       Imaging results reviewed over the past 24 hrs:   Recent Results (from the past 24 hours)   Ribs XR, unilat 3 views + PA chest,  left    Narrative    EXAM: XR RIBS AND CHEST LEFT 3 VIEWS  LOCATION: [facility]  DATE: 12/29/2024    INDICATION: [Chest wall pain after a fall.  COMPARISON: None.      Impression    IMPRESSION: Acute mildly displaced fractures of the anterior left fifth, sixth, seventh, eighth, and ninth ribs. Question trace left pleural fluid. No significant pneumothorax. Bones are demineralized. Thoracolumbar kyphoscoliosis. Degenerative changes   in the spine. Cardiomegaly. Tortuous and atherosclerotic thoracic aorta. Hyperinflated lungs compatible  with underlying COPD. Extensive surgical clips in the left chest wall.   Chest CT w/o contrast    Narrative    EXAM: CT CHEST W/O CONTRAST  LOCATION: [facility]  DATE: 12/29/2024    INDICATION: chest wall pain, known rib fractures  COMPARISON: Chest x-ray 12/29/2024  TECHNIQUE: CT chest without IV contrast. Multiplanar reformats were obtained. Dose reduction techniques were used.  CONTRAST: None.    FINDINGS:   LUNGS AND PLEURA: The lungs with subsegmental atelectasis both lower lobes. Scattered strands of fibrosis both lungs. No acute infiltrates or effusions. No pneumothorax.    MEDIASTINUM/AXILLAE: No mediastinal hemorrhage. No lymphadenopathy. Ascending thoracic aorta upper range of normal in size measuring 3.9 x 3.8 cm. Advanced atherosclerotic disease thoracic aorta.    CORONARY ARTERY CALCIFICATION: Severe.    UPPER ABDOMEN: Advanced atherosclerotic disease abdominal aorta.    MUSCULOSKELETAL: Nondisplaced fracture anterior left fourth rib and lateral left 10th rib. Old fracture lateral left ninth rib. Old lateral right fifth rib fracture. Old mid sternal fracture. Osteopenia. Scoliosis. Advanced degenerative disc disease   thoracic and upper lumbar spine. Surgical clips anterior left chest wall.      Impression    IMPRESSION:   1.  Subtle nondisplaced fractures involving the left fourth and left 10th ribs.  2.  Several old bilateral rib fractures and old sternal fracture.  3.  Scattered fibrotic changes and subsegmental atelectasis both lungs, otherwise no acute pulmonary disease.  4.  Severe atherosclerotic disease.

## 2024-12-30 NOTE — CARE PLAN
PRIMARY Concern: fall, found multiple rib fracture  SAFETY RISK Concerns (fall risk, behaviors, etc.): fall risk  Aggression Tool Color: n/a  Isolation/Type: n/a  Tests/Procedures for NEXT shift: am labs  Consults? (Pending/following, signed-off?) SW/PT/OT/thoracic surgery consult pending  Where is patient from? (Home, TCU, etc.): [facility]  Other Important info for NEXT shift: n/a  Anticipated DC date & active delays: TBD  _____________________________________________________________________________  SUMMARY NOTE:  Orientation/Cognitive: Ax04, forgetful  Observation Goals (Met/ Not Met): not met  Mobility Level/Assist Equipment: Ax1 gb/W  Antibiotics & Plan (IV/po, length of tx left): n/a  Pain Management: denies pain  Tele/VS/O2: VSS on RA  ABNL Lab/BG: See chart  Diet: reg  Bowel/Bladder: cont  Skin Concerns: scattered bruises/edema LLE, redness on bottom  Drains/Devices: PIVSL  Patient Stated Goal for Today: rest

## 2024-12-30 NOTE — H&P
"[facility]    History and Physical - Hospitalist Service       Date of Admission:  12/29/2024    Assessment & Plan      Deborah Higginbotham is a 89 year old female admitted on 12/29/2024 for multiple rib fractures after a fall.    She has a PMHx significant for hypertension.    Patient lives alone in an assisted living facility. She notes she is unsure of how she fell but she thinks she slipped on a rug while tryng to get into her bathroom, falling into the wall of the bathtub, and slamming her chest into it. She admits to left sided chest pain located underneath her left breast, exacerbated by deep inspiration. She pressed her life alert button, alerting EMS and patient was brought into the ED.    In the ED, her vitals revealed a BP of 159/81. Other VSS. Labs were significant for creatinine of 1.19, all other labs were wnl. CXR : Acute mildly displaced fractures of the anterior left fifth, sixth, seventh, eighth, and ninth ribs. Question trace left pleural fluid. No significant pneumothorax. Bones are demineralized. Thoracolumbar kyphoscoliosis     CT chest revealed   1.  Subtle nondisplaced fractures involving the left fourth and left 10th ribs.  2.  Several old bilateral rib fractures and old sternal fracture.      Multiple Rib fractures  Fall  -- admit to observation  -- confirmed on CT as above   -- Thoracic surgery consult.  --  pain control  -- Fall was likely mechanical  -- PT/OT    HTN  -- continue PTA coreg          Diet: Regular Diet Adult    DVT Prophylaxis: Pneumatic Compression Devices  Ferraro Catheter: Not present  Lines: None     Cardiac Monitoring: None  Code Status:  DNR/DNI    Clinically Significant Risk Factors Present on Admission                   # Hypertension: Noted on problem list           # Obesity: Estimated body mass index is 33.29 kg/m  as calculated from the following:    Height as of this encounter: 1.575 m (5' 2\").    Weight as of this encounter: 82.6 kg " (182 lb).              Disposition Plan     Medically Ready for Discharge: Anticipated in 2-4 Days           Marylin Alvarenga MD  Hospitalist Service  [facility]  Securely message with [employer] (more info)  Text page via [employer] Paging/Directory     ______________________________________________________________________    Chief Complaint   Fall    History is obtained from the patient, family and EMR    History of Present Illness   Deborah Higginbotham is a 89 year old female who presents after a fall with complaint of left sided chest pain.     She has a PMHx significant for hypertension.    Patient lives alone in an assisted living facility. She notes she is unsure of how she fell but she thinks she slipped on a rug while tryng to get into her bathroom, falling into the wall of the bathtub, and slamming her chest into it. She admits to left sided chest pain located underneath her left breast, exacerbated by deep inspiration. She pressed her life alert button, alerting EMS and patient was brought into the ED.    In the ED, her vitals revealed a BP of 159/81. Other VSS. Labs were significant for creatinine of 1.19, all other labs were wnl. CXR : Acute mildly displaced fractures of the anterior left fifth, sixth, seventh, eighth, and ninth ribs. Question trace left pleural fluid. No significant pneumothorax. Bones are demineralized. Thoracolumbar kyphoscoliosis     CT chest revealed   1.  Subtle nondisplaced fractures involving the left fourth and left 10th ribs.  2.  Several old bilateral rib fractures and old sternal fracture.      Past Medical History    Past Medical History:   Diagnosis Date    Cancer (H)     breast    High cholesterol     Hypertension        Past Surgical History   Past Surgical History:   Procedure Laterality Date    BREAST SURGERY      HERNIA REPAIR      ORTHOPEDIC SURGERY         Prior to Admission Medications   Prior to Admission Medications   Prescriptions Last Dose  Informant Patient Reported? Taking?   PRAVASTATIN SODIUM PO 12/28/2024 Bedtime Pharmacy, Self Yes Yes   Sig: Take 40 mg by mouth At Bedtime    VITAMIN D, CHOLECALCIFEROL, PO 12/29/2024 Morning Self Yes Yes   Sig: Take 2,000 Units by mouth daily    bumetanide (BUMEX) 2 MG tablet 12/29/2024 Morning  Yes Yes   Sig: Take 2 mg by mouth daily.   carvedilol (COREG) 25 MG tablet 12/29/2024 Morning  No Yes   Sig: Take 1 tablet (25 mg) by mouth 2 times daily (with meals)   potassium chloride (MICRO-K) 10 MEQ CR capsule 12/29/2024 Morning Pharmacy, Self Yes Yes   Sig: Take 10 mEq by mouth 2 times daily Morning & Noon.   senna (SENOKOT) 8.6 MG tablet 12/28/2024  Yes Yes   Sig: Take 2 tablets by mouth at bedtime.   spironolactone (ALDACTONE) 25 MG tablet 12/29/2024 Morning  Yes Yes   Sig: Take 25 mg by mouth daily.      Facility-Administered Medications: None        Review of Systems    The 10 point Review of Systems is negative other than noted in the HPI or here.     Social History   I have reviewed this patient's social history and updated it with pertinent information if needed.  Social History     Tobacco Use    Smoking status: Former    Smokeless tobacco: Never    Tobacco comments:     quit in the 70s   Substance Use Topics    Drug use: No         Family History   I have reviewed this patient's family history and updated it with pertinent information if needed.  Family History   Problem Relation Age of Onset    Coronary Artery Disease Father     Bone Cancer Father     Breast Cancer Sister         Physical Exam   Vital Signs: Temp: 97  F (36.1  C) Temp src: Temporal BP: 127/87 Pulse: 78   Resp: (!) 34 SpO2: 96 %      Weight: 182 lbs 0 oz    General Appearance: Well appearing for stated age.  Respiratory: CTAB, no rales or ronchi, left chest wall pain  Cardiovascular: S1, S2 normal, no murmurs  GI: non-tender on palpation, BS present      Medical Decision Making       55 MINUTES SPENT BY ME on the date of service doing chart  review, history, exam, documentation & further activities per the note.      Data     I have personally reviewed the following data over the past 24 hrs:    7.0  \   13.6   / 212     137 101 26.3 (H) /  123 (H)   4.4 24 1.19 (H) \       Imaging results reviewed over the past 24 hrs:   Recent Results (from the past 24 hours)   Ribs XR, unilat 3 views + PA chest,  left    Narrative    EXAM: XR RIBS AND CHEST LEFT 3 VIEWS  LOCATION: [facility]  DATE: 12/29/2024    INDICATION: [Chest wall pain after a fall.  COMPARISON: None.      Impression    IMPRESSION: Acute mildly displaced fractures of the anterior left fifth, sixth, seventh, eighth, and ninth ribs. Question trace left pleural fluid. No significant pneumothorax. Bones are demineralized. Thoracolumbar kyphoscoliosis. Degenerative changes   in the spine. Cardiomegaly. Tortuous and atherosclerotic thoracic aorta. Hyperinflated lungs compatible with underlying COPD. Extensive surgical clips in the left chest wall.   Chest CT w/o contrast    Narrative    EXAM: CT CHEST W/O CONTRAST  LOCATION: [facility]  DATE: 12/29/2024    INDICATION: chest wall pain, known rib fractures  COMPARISON: Chest x-ray 12/29/2024  TECHNIQUE: CT chest without IV contrast. Multiplanar reformats were obtained. Dose reduction techniques were used.  CONTRAST: None.    FINDINGS:   LUNGS AND PLEURA: The lungs with subsegmental atelectasis both lower lobes. Scattered strands of fibrosis both lungs. No acute infiltrates or effusions. No pneumothorax.    MEDIASTINUM/AXILLAE: No mediastinal hemorrhage. No lymphadenopathy. Ascending thoracic aorta upper range of normal in size measuring 3.9 x 3.8 cm. Advanced atherosclerotic disease thoracic aorta.    CORONARY ARTERY CALCIFICATION: Severe.    UPPER ABDOMEN: Advanced atherosclerotic disease abdominal aorta.    MUSCULOSKELETAL: Nondisplaced fracture anterior left fourth rib and lateral left 10th rib. Old  fracture lateral left ninth rib. Old lateral right fifth rib fracture. Old mid sternal fracture. Osteopenia. Scoliosis. Advanced degenerative disc disease   thoracic and upper lumbar spine. Surgical clips anterior left chest wall.      Impression    IMPRESSION:   1.  Subtle nondisplaced fractures involving the left fourth and left 10th ribs.  2.  Several old bilateral rib fractures and old sternal fracture.  3.  Scattered fibrotic changes and subsegmental atelectasis both lungs, otherwise no acute pulmonary disease.  4.  Severe atherosclerotic disease.

## 2024-12-31 ENCOUNTER — APPOINTMENT (OUTPATIENT)
Dept: PHYSICAL THERAPY | Facility: CLINIC | Age: 89
End: 2024-12-31
Attending: HOSPITALIST
Payer: COMMERCIAL

## 2024-12-31 LAB
ANION GAP SERPL CALCULATED.3IONS-SCNC: 16 MMOL/L (ref 7–15)
BUN SERPL-MCNC: 40.2 MG/DL (ref 8–23)
CALCIUM SERPL-MCNC: 9.8 MG/DL (ref 8.8–10.4)
CHLORIDE SERPL-SCNC: 101 MMOL/L (ref 98–107)
CREAT SERPL-MCNC: 1.61 MG/DL (ref 0.51–0.95)
EGFRCR SERPLBLD CKD-EPI 2021: 30 ML/MIN/1.73M2
GLUCOSE SERPL-MCNC: 138 MG/DL (ref 70–99)
HCO3 SERPL-SCNC: 20 MMOL/L (ref 22–29)
POTASSIUM SERPL-SCNC: 4.4 MMOL/L (ref 3.4–5.3)
SODIUM SERPL-SCNC: 137 MMOL/L (ref 135–145)

## 2024-12-31 PROCEDURE — 84295 ASSAY OF SERUM SODIUM: CPT | Performed by: HOSPITALIST

## 2024-12-31 PROCEDURE — 82435 ASSAY OF BLOOD CHLORIDE: CPT | Performed by: HOSPITALIST

## 2024-12-31 PROCEDURE — 250N000013 HC RX MED GY IP 250 OP 250 PS 637: Performed by: THORACIC SURGERY (CARDIOTHORACIC VASCULAR SURGERY)

## 2024-12-31 PROCEDURE — 99232 SBSQ HOSP IP/OBS MODERATE 35: CPT | Performed by: HOSPITALIST

## 2024-12-31 PROCEDURE — 999N000040 HC STATISTIC CONSULT NO CHARGE VASC ACCESS

## 2024-12-31 PROCEDURE — G0378 HOSPITAL OBSERVATION PER HR: HCPCS

## 2024-12-31 PROCEDURE — 250N000013 HC RX MED GY IP 250 OP 250 PS 637: Performed by: HOSPITALIST

## 2024-12-31 PROCEDURE — 999N000190 HC STATISTIC VAT ROUNDS

## 2024-12-31 PROCEDURE — 999N000128 HC STATISTIC PERIPHERAL IV START W/O US GUIDANCE

## 2024-12-31 PROCEDURE — 258N000003 HC RX IP 258 OP 636: Performed by: HOSPITALIST

## 2024-12-31 PROCEDURE — 97116 GAIT TRAINING THERAPY: CPT | Mod: GP

## 2024-12-31 PROCEDURE — 97161 PT EVAL LOW COMPLEX 20 MIN: CPT | Mod: GP

## 2024-12-31 PROCEDURE — 97530 THERAPEUTIC ACTIVITIES: CPT | Mod: GP

## 2024-12-31 PROCEDURE — 80048 BASIC METABOLIC PNL TOTAL CA: CPT | Performed by: HOSPITALIST

## 2024-12-31 PROCEDURE — 36415 COLL VENOUS BLD VENIPUNCTURE: CPT | Performed by: HOSPITALIST

## 2024-12-31 RX ORDER — SODIUM CHLORIDE 9 MG/ML
INJECTION, SOLUTION INTRAVENOUS CONTINUOUS
Status: ACTIVE | OUTPATIENT
Start: 2024-12-31 | End: 2025-01-01

## 2024-12-31 RX ORDER — PRAVASTATIN SODIUM 40 MG
40 TABLET ORAL AT BEDTIME
Status: DISCONTINUED | OUTPATIENT
Start: 2024-12-31 | End: 2025-01-03 | Stop reason: HOSPADM

## 2024-12-31 RX ADMIN — ACETAMINOPHEN 975 MG: 325 TABLET, FILM COATED ORAL at 10:37

## 2024-12-31 RX ADMIN — ACETAMINOPHEN 650 MG: 325 TABLET, FILM COATED ORAL at 01:01

## 2024-12-31 RX ADMIN — POTASSIUM CHLORIDE 10 MEQ: 750 TABLET, EXTENDED RELEASE ORAL at 12:19

## 2024-12-31 RX ADMIN — IBUPROFEN 600 MG: 600 TABLET ORAL at 01:00

## 2024-12-31 RX ADMIN — ACETAMINOPHEN 975 MG: 325 TABLET, FILM COATED ORAL at 21:53

## 2024-12-31 RX ADMIN — SENNOSIDES 2 TABLET: 8.6 TABLET, FILM COATED ORAL at 21:20

## 2024-12-31 RX ADMIN — SODIUM CHLORIDE: 9 INJECTION, SOLUTION INTRAVENOUS at 17:00

## 2024-12-31 RX ADMIN — LIDOCAINE 2 PATCH: 4 PATCH TOPICAL at 08:20

## 2024-12-31 RX ADMIN — POTASSIUM CHLORIDE 10 MEQ: 750 TABLET, EXTENDED RELEASE ORAL at 08:20

## 2024-12-31 RX ADMIN — METHOCARBAMOL 500 MG: 500 TABLET ORAL at 21:21

## 2024-12-31 RX ADMIN — IBUPROFEN 600 MG: 600 TABLET ORAL at 06:25

## 2024-12-31 RX ADMIN — SENNOSIDES AND DOCUSATE SODIUM 2 TABLET: 50; 8.6 TABLET ORAL at 15:39

## 2024-12-31 RX ADMIN — ACETAMINOPHEN 975 MG: 325 TABLET, FILM COATED ORAL at 15:39

## 2024-12-31 RX ADMIN — IBUPROFEN 600 MG: 600 TABLET ORAL at 12:19

## 2024-12-31 RX ADMIN — CARVEDILOL 25 MG: 25 TABLET, FILM COATED ORAL at 08:19

## 2024-12-31 RX ADMIN — SPIRONOLACTONE 25 MG: 25 TABLET ORAL at 08:20

## 2024-12-31 RX ADMIN — METHOCARBAMOL 500 MG: 500 TABLET ORAL at 14:25

## 2024-12-31 RX ADMIN — BUMETANIDE 2 MG: 1 TABLET ORAL at 08:19

## 2024-12-31 RX ADMIN — PRAVASTATIN SODIUM 40 MG: 40 TABLET ORAL at 21:20

## 2024-12-31 RX ADMIN — METHOCARBAMOL 500 MG: 500 TABLET ORAL at 08:20

## 2024-12-31 RX ADMIN — CARVEDILOL 25 MG: 25 TABLET, FILM COATED ORAL at 18:07

## 2024-12-31 ASSESSMENT — ACTIVITIES OF DAILY LIVING (ADL)
ADLS_ACUITY_SCORE: 55
ADLS_ACUITY_SCORE: 55
ADLS_ACUITY_SCORE: 49
ADLS_ACUITY_SCORE: 51
ADLS_ACUITY_SCORE: 55
ADLS_ACUITY_SCORE: 51
ADLS_ACUITY_SCORE: 49
ADLS_ACUITY_SCORE: 51
ADLS_ACUITY_SCORE: 49
ADLS_ACUITY_SCORE: 51
ADLS_ACUITY_SCORE: 49
ADLS_ACUITY_SCORE: 55
ADLS_ACUITY_SCORE: 51
ADLS_ACUITY_SCORE: 51
DEPENDENT_IADLS:: INDEPENDENT
ADLS_ACUITY_SCORE: 51

## 2024-12-31 NOTE — PROGRESS NOTES
PRIMARY Concern: fall found multiple rib fracture     Orientation: A/O x4    Vitals/Tele: Jerry REEDER    IV Access/drains: PIV SL    Diet: Regular    Mobility: A x1 GB/W    GI/: Continent of B/B    Wound/Skin: RL abd. Hernia,BLE scattered bruising. Sacrum redness/rash    Consults:    Discharge Plan: TBD      See Flow sheets for assessment

## 2024-12-31 NOTE — PLAN OF CARE
Goal Outcome Evaluation:    Shift Note: 0700-1930    Orientation: A/Ox4   Aggression Stop Light: Green   Activity: A1 GB/walker   Diet/BS Checks: Regular diet   Tele: N/a   IV Access/Drains: R PIV- SL   Pain Management: PRN oxycodone given x1, Tylenol dose increased and now scheduled   Abnormal VS/Results: VSS, on RA   Bowel/Bladder: Incontinent of bladder at times   Skin/Wounds: Scattered bruising, BBLE edema   Consults: Thoracis, PT/OT, SW following   D/C Disposition: Pending pt status       Observation goals  PRIOR TO DISCHARGE         Comments:   -diagnostic tests and consults completed and resulted- Not met, PT/OT pending      -vital signs normal or at patient baseline- Met    -adequate pain control on oral analgesics- Not met     -safe disposition plan has been identified- not met, consult pending    Nurse to notify provider when observation goals have been met and patient is ready for discharge.

## 2024-12-31 NOTE — PROGRESS NOTES
Status: Patient admitted on 12/29 after fall resulting in 5 rib fractures on the left side  Vitals: VSS  Neuros: Intact ex [place] and generalized weakness  IV: PIV saline locked  Labs/Electrolytes: WNL  Resp/trach: Lung sounds with fine crackles in lower lobes, IS at bedside  Diet: Regular  Bowel status: Last BM yesterday, BS+  : Voiding spontaneously  Skin: Intact  Pain: Patient c/o left flank pain relieved with Tylenol, Robaxin and Lidoderm patches  Activity: Up with A1, walker, gaitbelt  Social: Cooperative with cares, updated daughter Corin via phone  Plan: Discharge to TCU pending placement, continue to monitor and follow POC

## 2024-12-31 NOTE — CONSULTS
Consult placed for [facility] housing concerns. Patient lives at [facility], confirmed with their  [phone].    JESSIE Veras  Social Work  [facility]

## 2024-12-31 NOTE — PROGRESS NOTES
bservation goals  PRIOR TO DISCHARGE        Comments: -diagnostic tests and consults completed and resulted: Not met   -vital signs normal or at patient baseline: Met   -adequate pain control on oral analgesics: Progressing   -safe disposition plan has been identified: Progressing   Nurse to notify provider when observation goals have been met and patient is ready for discharge.

## 2024-12-31 NOTE — PROGRESS NOTES
12/31/24 0930   Appointment Info   Signing Clinician's Name / Credentials (PT) Juan Benitez DPT   Quick Adds   Quick Adds Certification   Living Environment   People in Home alone   Current Living Arrangements independent living facility   Home Accessibility no concerns   Transportation Anticipated family or friend will provide   Living Environment Comments Reports living alone in ILF.   Self-Care   Usual Activity Tolerance good   Current Activity Tolerance fair   Equipment Currently Used at Home walker, rolling   Fall history within last six months yes   Number of times patient has fallen within last six months 2   Activity/Exercise/Self-Care Comment Reports baseline independent w/ ADLs. 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 Daughter helps with the grocery shopping for Pt. Uses a 4WW for all mobility. Reports family unable to stay w/ Pt to assist.   General Information   Onset of Illness/Injury or Date of Surgery 12/29/24   Referring Physician Haliee Lara MD   Patient/Family Therapy Goals Statement (PT) Return to home   Pertinent History of Current Problem (include personal factors and/or comorbidities that impact the POC) This is a 89-year-old female with medical history which is significant for hypertension who lives at assisted facility and presented to the ED on 12/29/2024 with chief complaint of fall likely mechanical and was diagnosed with multiple rib fractures after she complained of left-sided chest pain and admitted to the hospital on 12/29/2024   Existing Precautions/Restrictions fall   Cognition   Affect/Mental Status (Cognition) WFL   Orientation Status (Cognition) oriented x 4   Follows Commands (Cognition) WFL   Pain Assessment   Patient Currently in Pain Yes, see Vital Sign flowsheet  (L sided rib pain)   Integumentary/Edema   Integumentary/Edema Comments Bruising noted to L ribs   Range of Motion (ROM)   ROM Comment WFLs for mobility and transfers no formal testing completed   Strength (Manual Muscle  Testing)   Strength Comments Generalized weakness noted w/ mobility and transfers no formal testing completed   Bed Mobility   Comment, (Bed Mobility) Sit to supine w/ CGA   Transfers   Comment, (Transfers) Sit to stand w/ 4WW and CGA   Gait/Stairs (Locomotion)   Comment, (Gait/Stairs) 10 ft w/ 4WW and CGA   Balance   Balance Comments Mild unsteadiness noted w/ ambulation   Clinical Impression   Criteria for Skilled Therapeutic Intervention Yes, treatment indicated   PT Diagnosis (PT) Impaired ambulation   Influenced by the following impairments Impaired strength, balance and activity tolerance   Functional limitations due to impairments Impaired ADLs, IADLs and functional mobility   Clinical Presentation (PT Evaluation Complexity) stable   Clinical Presentation Rationale Clinical judgment   Clinical Decision Making (Complexity) low complexity   Planned Therapy Interventions (PT) balance training;bed mobility training;gait training;home exercise program;patient/family education;stair training;strengthening;transfer training;progressive activity/exercise   Risk & Benefits of therapy have been explained evaluation/treatment results reviewed;care plan/treatment goals reviewed;risks/benefits reviewed;current/potential barriers reviewed;participants voiced agreement with care plan;participants included;patient   PT Total Evaluation Time   PT Eval, Low Complexity Minutes (85688) 10   Therapy Certification   Start of care date 12/31/24   Certification date from 12/31/24   Certification date to 01/10/25   Medical Diagnosis Rib fxs   Physical Therapy Goals   PT Frequency 5x/week   PT Predicted Duration/Target Date for Goal Attainment 01/10/25   PT Goals Bed Mobility;Transfers;Gait   PT: Bed Mobility Independent;Supine to/from sit   PT: Transfers Modified independent;Sit to/from stand;Assistive device   PT: Gait Modified independent;Greater than 200 feet;Rolling walker   Interventions   Interventions Quick Adds Therapeutic  "Activity;Gait Training   Therapeutic Activity   Therapeutic Activities: dynamic activities to improve functional performance Minutes (96758) 10   Symptoms Noted During/After Treatment Increased pain;Fatigue   Treatment Detail/Skilled Intervention Pt seated in bedside chair at start of session. Agreeable to PT. Attempted donning socks/shoes in sitting in chair, however, Pt reports chair not low enough and increased pain noted. Transfering to EOB w/ 4WW and CGA to attempt from lower surface. Continues to be unable to get socks all the way on. Reports would use \"carpet to help slide it\". Notes also would use shoe horn for shoes but able to get 1 shoe on. Sit <> supine w/ SBA. Increased pain noted especially w/ laying flat. Sit to stand x3 during session w/ 4WW and CGA. Frequent cues for pulling up walker square to surface she is going to sit on and locking breaks. Reports \"I always forget to do that\". Extra time for discussing safe DC planning as Pt notes no one would be able to stay with her. Seated in bedside chair at end of session w/ call light in place and chair alarm on. Reports feeling incrediably weak during session. SPO2 stable on RA throughout mobility.   Gait Training   Gait Training Minutes (22667) 15   Symptoms Noted During/After Treatment (Gait Training) fatigue;increased pain   Treatment Detail/Skilled Intervention Pt ambulating ~60 ft x1, 110 ft x2 w/ 4WW and CGA. Slow pierce noted. Short shuffling pierce. Heavy reliance on 4WW. Reports increase in rib pain w/ ambulation radiating into L shoulder. Dyspnea noted upon ambulation. Mild unsteadiness w/ gait.   PT Discharge Planning   PT Plan Review bed mobility, activity tolerance, gait distance, repeat STS   PT Discharge Recommendation (DC Rec) Transitional Care Facility   PT Rationale for DC Rec Pt below baseline mobility. Currently limited by strength, activity tolerance and pain. Currently only able to tolerate short distance mobility w/ 4WW d/t above " deficits. Anticipate when medically ready will need TCU to improve upon functional mobility and strengthening.   PT Brief overview of current status Goals of therapy will be to address safe mobility and make recs for d/c to next level of care. Pt and RN will continue to follow all falls risk precautions as documented by RN staff while hospitalized   Physical Therapy Time and Intention   Timed Code Treatment Minutes 25   Total Session Time (sum of timed and untimed services) 35   [facility]                                                                                   OUTPATIENT PHYSICAL THERAPY    PLAN OF TREATMENT FOR OUTPATIENT REHABILITATION   Patient's Last Name, First Name, Deborah Gomez YOB: 1931   Provider's Name   [facility]   Medical Record No.  [id number]     Onset Date: 12/29/24 Start of Care Date: 12/31/24     Medical Diagnosis:  Rib fxs               PT Diagnosis:  Impaired ambulation Certification Dates:  From: 12/31/24  To: 01/10/25       See note for plan of treatment, functional goals, and certification details.    I CERTIFY THE NEED FOR THESE SERVICES FURNISHED UNDER        THIS PLAN OF TREATMENT AND WHILE UNDER MY CARE (Physician co-signature of this document indicates review and certification of the therapy plan).

## 2024-12-31 NOTE — PROGRESS NOTES
[facility]    Medicine Progress Note - Hospitalist Service    Date of Admission:  12/29/2024    Assessment & Plan     This is a 89-year-old female with medical history which is significant for hypertension who lives at assisted facility and presented to the ED on 12/29/2024 with chief complaint of fall likely mechanical and was diagnosed with multiple rib fractures after she complained of left-sided chest pain and admitted to the hospital on 12/29/2024    Subtle nondisplaced fractures involving the left fourth and left 10th ribs.   Likely mechanical fall  -On admission presented likely after a mechanical fall and was complaining of pain over the left side of the chest worsened with inspiration  -On presentation to the ER blood pressure of 159/81 and other vitals were stable and labs were pertinent for creatinine of 1.19  -Chest x-ray showed  Acute mildly displaced fractures of the anterior left fifth, sixth, seventh, eighth, and ninth ribs. Question trace left pleural fluid. No significant pneumothorax. Bones are demineralized. Thoracolumbar kyphoscoliosis   -CT chest revealed subtle nondisplaced fracture involving the left fourth and 10th rib and several old bilateral rib fractures and old sternal fracture  -Thoracic surgery consulted and appreciate input and they have added Robaxin and ibuprofen along with scheduled Tylenol and lidocaine patch and as needed oxycodone  - will continue with tylenol, robaxin and lidocaine patch and prn oxycodone  - may need pain med adjustment if pain still high  -PT OT consulted and will need TCU and sw is looking into it     Hypertension  -Continue with PTA Coreg for now and monitor bp    Edema   - PTA regimen includes : bumex and spironolactone and potassium  - will hold all given elevated creatinine    Acute kidney injury on ckd stage 3  - Her baseline creatinine is around 1.2-1.3  -PTA regimen does include bumex and spironolactone  -of note she was  "started on ibuprofen by thoracic yesterday and there was delay in labs this am   - This noon creatinine is high at 1.61  - This might be due to decreased oral intake along with use of diuretics and inuprofen use   - will hold ibupreofen , bumex and spironolactone  -Will order IV fluids and patient is in agreement    HLD  - will restart statin     Family communication  -I did discuss plan of care in detail with patient's son and daughter in law and I also called her daughter Briana who was appreciative of the care           Observation Goals: -diagnostic tests and consults completed and resulted, -vital signs normal or at patient baseline, -adequate pain control on oral analgesics, -safe disposition plan has been identified, Nurse to notify provider when observation goals have been met and patient is ready for discharge.  Diet: Regular Diet Adult    DVT Prophylaxis: Pneumatic Compression Devices  Ferraro Catheter: Not present  Lines: None     Cardiac Monitoring: None  Code Status: No CPR- Do NOT Intubate      Clinically Significant Risk Factors Present on Admission                   # Hypertension: Noted on problem list           # Obesity: Estimated body mass index is 33.29 kg/m  as calculated from the following:    Height as of this encounter: 1.575 m (5' 2\").    Weight as of this encounter: 82.6 kg (182 lb).              Social Drivers of Health    Housing Stability: High Risk (12/29/2024)    Housing Stability     Do you have housing? : No     Are you worried about losing your housing?: No   Tobacco Use: Medium Risk (11/1/2024)    Received from [facility]    Patient History     Smoking Tobacco Use: Former     Smokeless Tobacco Use: Never    Received from [facility]    Social Connections          Disposition Plan     Medically Ready for Discharge: Anticipated Tomorrow, depending on how her pain is             Hailee Lara MD  Hospitalist Service  M " [facility]  Securely message with [employer] (more info)  Text page via [employer] Paging/Directory     This note was completed in part using dictation via the Dragon voice recognition software. Some word and grammatical errors may occur and must be interpreted in the appropriate clinical context. If there are any questions pertaining to this issue, please contact me for further clarification.      I am off service in am and her care to be taken over by hospital medicine team  ______________________________________________________________________    Interval History     -Seen this morning and is still complaining of 7 out of 10 pain over the rib fracture site and has been using narcotic  -No fever, no chills, no nausea, no vomiting or chest discomfort  - family by her side and they are satisfied with plan of care and explained to them on reasoning of statin hold yesterday and was started as per request    Physical Exam   Vital Signs: Temp: 97.4  F (36.3  C) Temp src: Oral BP: (!) 148/8 Pulse: 61   Resp: 22 SpO2: 93 % O2 Device: None (Room air)    Weight: 182 lbs 0 oz        General: AOx3  Respiratory: CTA LA  Cardiovascular: Regular rate , S1 and S2 normal with no murmer or rubs or gallops  Abdomen:   soft , non tender , non distended and bowel sound present   Skin: No skin rashes or lesions to inspection or palpation.  Neurologic: No focal deficits  Musculoskeletal: Normal Range of motion over upper and lower extremities bilaterally   Psychiatric: cooperative      Medical Decision Making       Time spent in care of patient is 43 minutes and I reviewed labs and discussed plan of care in detail with the patient, nursing staff and family and also reviewed notes from the thoracic surgery team  Data     I have personally reviewed the following data over the past 24 hrs:    N/A  \   N/A   / N/A     137 101 40.2 (H) /  138 (H)   4.4 20 (L) 1.61 (H) \       Imaging results reviewed over the past 24 hrs:   No  results found for this or any previous visit (from the past 24 hours).

## 2024-12-31 NOTE — PLAN OF CARE
Goal Outcome Evaluation:    Orientation: A&Ox4  Aggression Stop Light: green  Activity: A1 GBW  Diet/BS Checks: regular diet  Tele: NA  IV Access/Drains: R PIV SL  Pain Management: c/o 7/10 pain to L chest / ribs, scheduled Tylenol and ibuprofen, heat applied  Abnormal VS/Results: VSS on RA except HTN  Bowel/Bladder: continent b/b, sometimes incontinent d/t urgency  Skin/Wounds: scattered bruising, trace edema to BLE  Consults: SWI, PT, OT  D/C Disposition: Plan pending

## 2024-12-31 NOTE — CONSULTS
Care Management Initial Consult    General Information  Assessment completed with: Patient,    Type of CM/SW Visit: Initial Assessment    Primary Care Provider verified and updated as needed:     Readmission within the last 30 days:        Reason for Consult: discharge planning  Advance Care Planning: Advance Care Planning Reviewed: present on chart          Communication Assessment  Patient's communication style: spoken language (English or Bilingual)    Hearing Difficulty or Deaf: no        Cognitive  Cognitive/Neuro/Behavioral: WDL  Level of Consciousness: alert  Arousal Level: opens eyes spontaneously  Orientation: disoriented to, time  Mood/Behavior: calm, cooperative  Best Language: 0 - No aphasia  Speech: clear, spontaneous, logical    Living Environment:   People in home: alone, facility resident     Current living Arrangements: independent living facility      Able to return to prior arrangements: other (see comments)  Living Arrangement Comments: PT recommending TCU    Family/Social Support:  Care provided by: self  Provides care for: no one  Marital Status:   Support system: Children          Description of Support System: Supportive    Support Assessment: Adequate family and caregiver support    Current Resources:   Patient receiving home care services: No        Community Resources:    Equipment currently used at home:    Supplies currently used at home:      Employment/Financial:  Employment Status: retired        Financial Concerns:             Does the patient's insurance plan have a 3 day qualifying hospital stay waiver?  Yes     Which insurance plan 3 day waiver is available? Alternative insurance waiver    Will the waiver be used for post-acute placement? Yes    Lifestyle & Psychosocial Needs:  Social Drivers of Health     Food Insecurity: Low Risk  (12/29/2024)    Food Insecurity     Within the past 12 months, did you worry that your food would run out before you got money to buy more?: No      Within the past 12 months, did the food you bought just not last and you didn t have money to get more?: No   Depression: Not at risk (9/13/2024)    Received from [facility]    PHQ-2     PHQ-2 TOTAL SCORE: 0   Housing Stability: High Risk (12/29/2024)    Housing Stability     Do you have housing? : No     Are you worried about losing your housing?: No   Tobacco Use: Medium Risk (11/1/2024)    [facility]    Patient History     Smoking Tobacco Use: Former     Smokeless Tobacco Use: Never     Passive Exposure: Not on file   Financial Resource Strain: Low Risk  (12/29/2024)    Financial Resource Strain     Within the past 12 months, have you or your family members you live with been unable to get utilities (heat, electricity) when it was really needed?: No   Alcohol Use: Not on file   Transportation Needs: Low Risk  (12/29/2024)    Transportation Needs     Within the past 12 months, has lack of transportation kept you from medical appointments, getting your medicines, non-medical meetings or appointments, work, or from getting things that you need?: No   Physical Activity: Not on file   Interpersonal Safety: Not on file   Stress: Not on file   Social Connections: Unknown (12/27/2021)    Received from [facility] license of [facility]    Social Connections     Frequency of Communication with Friends and Family: Not on file   Health Literacy: Not on file       Functional Status:  Prior to admission patient needed assistance:   Dependent ADLs:: Ambulation-walker  Dependent IADLs:: Independent       Mental Health Status:          Chemical Dependency Status:                Values/Beliefs:  Spiritual, Cultural Beliefs, Adventism Practices, Values that affect care:                 Discussed  Partnership in Safe Discharge Planning  document with patient/family: No    Additional Information:  Patient was admitted on 12/29/24 for  multiple rib fractures after a fall, per H&P. Spoke with patient, who confirms they are in the ILF at [facility] and receive no services. Pt lists their daughter Briana as a support. Discussed PT recommendation for TCU at discharge. Pt is in agreement, states they live alone and don't have the help at home right now. Pt understands they would have to go to an in-network facility with [employer]. Provided with TCU list. Pt has been to a [facility] at [place] and would not want to return. Pt states their daughter would decide what TCU choices to send to. SW to call. Pt understands that the holiday may cause some delay in getting to TCU/insurance auth.     Spoke with daughter Briana, who is in agreement with plan for TCU. Requested Briana choose 3 or more choices from the list and follow up with SW. She requested the [employer] list be e-mailed to her at [email or web address]. She stated she is aware it may be a delay with the holiday and will follow up with SW as soon as she can with the choices.     Addendum 1150: Received choices from daughter Briana. Sent referrals.  #1-[facility]   #2-[facility]  #3-[facility]    JESSIE Veras  [facility] Work  [facility]

## 2024-12-31 NOTE — CONSULTS
THORACIC SURGERY CONSULT NOTE    Consult Reason: Rib fractures    HPI: This is a 89 year old woman who fell at her assisted living facility. Her daughter thinks her mother fell while going to the bathroom. Ms. Higginbotham uses a walker and sometimes goes into the bathroom without it.     A/P: Patient is a 89 year old female with acute left-sdied 4th and 10th rib fractures. She has several old rib fractures on that side.   -Pain control with scheduled tylenol and ibuprofen and as needed oxycodone, methacarbamol.  - Incentive spirometry  -Ambulation  - Discharge per primary team      Thank you for the opportunity to participate in the care of this patient.    PMH:  Past Medical History:   Diagnosis Date    Cancer (H)     breast    High cholesterol     Hypertension          PSH:  Past Surgical History:   Procedure Laterality Date    BREAST SURGERY      HERNIA REPAIR      ORTHOPEDIC SURGERY           FH:  family history includes Bone Cancer in her father; Breast Cancer in her sister; Coronary Artery Disease in her father.      SH:  Social History     Tobacco Use    Smoking status: Former    Smokeless tobacco: Never    Tobacco comments:     quit in the 70s   Substance Use Topics    Drug use: No        Allergies:  No Known Allergies    Home Meds:  Medications Prior to Admission   Medication Sig Dispense Refill Last Dose/Taking    bumetanide (BUMEX) 2 MG tablet Take 2 mg by mouth daily.   12/29/2024 Morning    carvedilol (COREG) 25 MG tablet Take 1 tablet (25 mg) by mouth 2 times daily (with meals)   12/29/2024 Morning    potassium chloride (MICRO-K) 10 MEQ CR capsule Take 10 mEq by mouth 2 times daily Morning & Noon.   12/29/2024 Morning    PRAVASTATIN SODIUM PO Take 40 mg by mouth At Bedtime    12/28/2024 Bedtime    senna (SENOKOT) 8.6 MG tablet Take 2 tablets by mouth at bedtime.   12/28/2024    spironolactone (ALDACTONE) 25 MG tablet Take 25 mg by mouth daily.   12/29/2024 Morning    VITAMIN D, CHOLECALCIFEROL, PO Take  2,000 Units by mouth daily    12/29/2024 Morning         Physical Exam:  Temp:  [97.5  F (36.4  C)-98  F (36.7  C)] 97.6  F (36.4  C)  Pulse:  [58-69] 63  Resp:  [16-18] 18  BP: (112-132)/(56-77) 114/59  SpO2:  [94 %-96 %] 95 %    Gen: NAD, resting comfortably in chair  Lungs: non-labored breathing  Neuro: Alert    Labs:  ABG No lab results found in last 7 days.  CBC  Recent Labs   Lab 12/30/24  0931 12/29/24  1544   WBC 7.9 7.0   HGB 13.7 13.6    212     BMP  Recent Labs   Lab 12/30/24  0931 12/29/24  1544    137   POTASSIUM 4.1 4.4   CHLORIDE 103 101   CO2 21* 24   BUN 27.6* 26.3*   CR 1.10* 1.19*   * 123*       Imaging:  CT chest and chest x-ray reviewed          Roldan Sanders MD

## 2024-12-31 NOTE — PROGRESS NOTES
Observation goals  PRIOR TO DISCHARGE        Comments: -diagnostic tests and consults completed and resulted: No awaiting PT consult  -vital signs normal or at patient baseline: Yes  -adequate pain control on oral analgesics: In process  -safe disposition plan has been identified- No  Nurse to notify provider when observation goals have been met and patient is ready for discharge.

## 2024-12-31 NOTE — CARE PLAN
PRIMARY Concern: fall, found multiple rib fracture  SAFETY RISK Concerns (fall risk, behaviors, etc.): fall risk  Aggression Tool Color: n/a  Isolation/Type: n/a  Tests/Procedures for NEXT shift: am labs  Consults? (Pending/following, signed-off?) PT recommending TCU; SW sent three referrals. Thoracic surgery signed off  Where is patient from? (Home, TCU, etc.): [facility]  Other Important info for NEXT shift: NICK on CKD 3b; ibuprofen on hold; initiated NS @ 75mL/hr  Anticipated DC date & active delays: TBD  _____________________________________________________________________________  SUMMARY NOTE:  Orientation/Cognitive: Ax04, forgetful, hard of hearing   Observation Goals (Met/ Not Met): not met  Mobility Level/Assist Equipment: Ax1 gb/W  Antibiotics & Plan (IV/po, length of tx left): n/a  Pain Management: denies pain  Tele/VS/O2: VSS on RA  ABNL Lab/BG: Creatinine 1.10---->1.61, GFR 47--->30. CT results: subtle nondisplaced fractures of 4th and 10th ribs.  Diet: reg  Bowel/Bladder: cont  Skin Concerns: scattered bruises/edema LLE, redness on bottom  Drains/Devices: PIV in right AC infusing NS @ 75mL/hr  Patient Stated Goal for Today: rest

## 2025-01-01 LAB
ANION GAP SERPL CALCULATED.3IONS-SCNC: 13 MMOL/L (ref 7–15)
BUN SERPL-MCNC: 35.2 MG/DL (ref 8–23)
CALCIUM SERPL-MCNC: 9.1 MG/DL (ref 8.8–10.4)
CHLORIDE SERPL-SCNC: 98 MMOL/L (ref 98–107)
CREAT SERPL-MCNC: 1.25 MG/DL (ref 0.51–0.95)
EGFRCR SERPLBLD CKD-EPI 2021: 40 ML/MIN/1.73M2
GLUCOSE SERPL-MCNC: 111 MG/DL (ref 70–99)
HCO3 SERPL-SCNC: 20 MMOL/L (ref 22–29)
POTASSIUM SERPL-SCNC: 4 MMOL/L (ref 3.4–5.3)
SODIUM SERPL-SCNC: 131 MMOL/L (ref 135–145)

## 2025-01-01 PROCEDURE — 96361 HYDRATE IV INFUSION ADD-ON: CPT

## 2025-01-01 PROCEDURE — 250N000013 HC RX MED GY IP 250 OP 250 PS 637: Performed by: THORACIC SURGERY (CARDIOTHORACIC VASCULAR SURGERY)

## 2025-01-01 PROCEDURE — 80048 BASIC METABOLIC PNL TOTAL CA: CPT | Performed by: HOSPITALIST

## 2025-01-01 PROCEDURE — 250N000013 HC RX MED GY IP 250 OP 250 PS 637: Performed by: HOSPITALIST

## 2025-01-01 PROCEDURE — 250N000013 HC RX MED GY IP 250 OP 250 PS 637: Performed by: PHYSICIAN ASSISTANT

## 2025-01-01 PROCEDURE — G0378 HOSPITAL OBSERVATION PER HR: HCPCS

## 2025-01-01 PROCEDURE — 99232 SBSQ HOSP IP/OBS MODERATE 35: CPT | Performed by: PHYSICIAN ASSISTANT

## 2025-01-01 PROCEDURE — 36415 COLL VENOUS BLD VENIPUNCTURE: CPT | Performed by: HOSPITALIST

## 2025-01-01 PROCEDURE — 96360 HYDRATION IV INFUSION INIT: CPT

## 2025-01-01 PROCEDURE — 96372 THER/PROPH/DIAG INJ SC/IM: CPT | Performed by: PHYSICIAN ASSISTANT

## 2025-01-01 PROCEDURE — 250N000011 HC RX IP 250 OP 636: Performed by: PHYSICIAN ASSISTANT

## 2025-01-01 PROCEDURE — 258N000003 HC RX IP 258 OP 636: Performed by: HOSPITALIST

## 2025-01-01 RX ORDER — BUMETANIDE 1 MG/1
2 TABLET ORAL DAILY
Status: DISCONTINUED | OUTPATIENT
Start: 2025-01-01 | End: 2025-01-03 | Stop reason: HOSPADM

## 2025-01-01 RX ORDER — SODIUM CHLORIDE 9 MG/ML
INJECTION, SOLUTION INTRAVENOUS CONTINUOUS
Status: DISCONTINUED | OUTPATIENT
Start: 2025-01-01 | End: 2025-01-01

## 2025-01-01 RX ORDER — HEPARIN SODIUM 5000 [USP'U]/.5ML
5000 INJECTION, SOLUTION INTRAVENOUS; SUBCUTANEOUS EVERY 8 HOURS
Status: DISCONTINUED | OUTPATIENT
Start: 2025-01-01 | End: 2025-01-03 | Stop reason: HOSPADM

## 2025-01-01 RX ORDER — LIDOCAINE 4 G/G
2 PATCH TOPICAL
Status: DISCONTINUED | OUTPATIENT
Start: 2025-01-02 | End: 2025-01-01

## 2025-01-01 RX ADMIN — SENNOSIDES AND DOCUSATE SODIUM 2 TABLET: 50; 8.6 TABLET ORAL at 18:05

## 2025-01-01 RX ADMIN — SENNOSIDES 2 TABLET: 8.6 TABLET, FILM COATED ORAL at 21:17

## 2025-01-01 RX ADMIN — ACETAMINOPHEN 975 MG: 325 TABLET, FILM COATED ORAL at 22:07

## 2025-01-01 RX ADMIN — HEPARIN SODIUM 5000 UNITS: 5000 INJECTION, SOLUTION INTRAVENOUS; SUBCUTANEOUS at 18:43

## 2025-01-01 RX ADMIN — ACETAMINOPHEN 975 MG: 325 TABLET, FILM COATED ORAL at 10:51

## 2025-01-01 RX ADMIN — MAGNESIUM HYDROXIDE 30 ML: 400 SUSPENSION ORAL at 10:51

## 2025-01-01 RX ADMIN — METHOCARBAMOL 500 MG: 500 TABLET ORAL at 08:41

## 2025-01-01 RX ADMIN — OXYCODONE HYDROCHLORIDE 5 MG: 5 TABLET ORAL at 13:06

## 2025-01-01 RX ADMIN — METHOCARBAMOL 500 MG: 500 TABLET ORAL at 15:29

## 2025-01-01 RX ADMIN — METHOCARBAMOL 500 MG: 500 TABLET ORAL at 21:17

## 2025-01-01 RX ADMIN — HEPARIN SODIUM 5000 UNITS: 5000 INJECTION, SOLUTION INTRAVENOUS; SUBCUTANEOUS at 10:54

## 2025-01-01 RX ADMIN — PRAVASTATIN SODIUM 40 MG: 40 TABLET ORAL at 21:17

## 2025-01-01 RX ADMIN — LIDOCAINE 2 PATCH: 4 PATCH TOPICAL at 08:41

## 2025-01-01 RX ADMIN — ACETAMINOPHEN 975 MG: 325 TABLET, FILM COATED ORAL at 04:09

## 2025-01-01 RX ADMIN — BUMETANIDE 2 MG: 1 TABLET ORAL at 10:51

## 2025-01-01 RX ADMIN — SODIUM CHLORIDE: 9 INJECTION, SOLUTION INTRAVENOUS at 06:32

## 2025-01-01 RX ADMIN — ACETAMINOPHEN 975 MG: 325 TABLET, FILM COATED ORAL at 16:49

## 2025-01-01 ASSESSMENT — ACTIVITIES OF DAILY LIVING (ADL)
ADLS_ACUITY_SCORE: 58

## 2025-01-01 NOTE — PLAN OF CARE
complete         PRIMARY Concern: fall, found multiple rib fracture  SAFETY RISK Concerns (fall risk, behaviors, etc.): fall risk  Aggression Tool Color: n/a  Isolation/Type: n/a  Tests/Procedures for NEXT shift: am labs  Consults? (Pending/following, signed-off?)  Thoracic surgery signed off  Where is patient from? (Home, TCU, etc.): [facility]  Other Important info for NEXT shift: NICK on CKD 3b.  Anticipated DC date & active delays: TBD. Pending clinical improvement      SUMMARY NOTE:  Orientation/Cognitive: Ax04, forgetful, hard of hearing   Observation Goals (Met/ Not Met): not met  Mobility Level/Assist Equipment: Ax1 gb/W  Antibiotics & Plan (IV/po, length of tx left): n/a  Pain Management: denies pain, Scheduled Tylenol Q6  Tele/VS/O2: VSS on RA  ABNL Lab/BG: Creatinine 1.10  Diet: reg  Bowel/Bladder: cont  Skin Concerns: scattered bruises/edema LLE, redness on bottom  Drains/Devices: PIV SL  Patient Stated Goal for Today: Sleep

## 2025-01-01 NOTE — PLAN OF CARE
complete         PRIMARY Concern: fall, found multiple rib fracture  SAFETY RISK Concerns (fall risk, behaviors, etc.): fall risk  Aggression Tool Color: n/a  Isolation/Type: n/a  Tests/Procedures for NEXT shift: am labs  Consults? (Pending/following, signed-off?)  Thoracic surgery signed off  Where is patient from? (Home, TCU, etc.): [facility]  Other Important info for NEXT shift: NICK on CKD 3b.  Anticipated DC date & active delays: TBD. Pending clinical improvement      SUMMARY NOTE:  Orientation/Cognitive: Ax04, forgetful, hard of hearing   Observation Goals (Met/ Not Met): not met  Mobility Level/Assist Equipment: Ax1 gb/W  Antibiotics & Plan (IV/po, length of tx left): n/a  Pain Management: denies pain, Scheduled Tylenol Q6  Tele/VS/O2: VSS on RA  ABNL Lab/BG: Creatinine 1.10  Diet: reg  Bowel/Bladder: cont  Skin Concerns: scattered bruises/edema LLE, redness on bottom  Drains/Devices: PIV infusing @ 75ml/hr  Patient Stated Goal for Today: Sleep

## 2025-01-01 NOTE — PROGRESS NOTES
"[facility]    Medicine Progress Note - Hospitalist Service    Date of Admission:  12/29/2024    Assessment & Plan   Deborah \"Tammi\" Neftaly is a 89-year-old female with past medical history significant for hypertension, hyperlipidemia and CKD stage III who lives at assisted facility and presented to [facility] ED on 12/29/2024 with chief complaint of fall likely mechanical and was diagnosed with multiple rib fractures after she complained of left-sided chest pain. [facility], respiratory monitoring and pain control.    Left 4th and 10th Rib Fractures secondary to mechanical fall  - Chest x-ray showed acute mildly displaced fractures of the anterior left fifth, sixth, seventh, eighth, and ninth ribs. Question trace left pleural fluid. No significant pneumothorax. Bones are demineralized. Thoracolumbar kyphoscoliosis   - CT chest revealed subtle nondisplaced fracture involving the left fourth and 10th rib and several old bilateral rib fractures and old sternal fracture  - Multimodal pain regimen with with tylenol, robaxin and lidocaine patch and PRN oxycodone  - PT OT consulted and will need TCU   - Encourage deep breaths, activity as tolerated and incentive spirometry  - Monitor on continuous oximetry  - Remains stable on room air    Hyponatremia, mild  Likely related to holding patient's PTA diuretics, hypervolemic hyponatremia. I&O with +480 this admit  - 131 this morning  - Restart PTA Bumex   - Monitor     Pertinent medical history:  Hypertension - continue PTA Coreg with hold parameters  Hyperlipidemia - continue PTA statin  Edema - restart Bumex today; holding PTA spironolactone and potassium given elevated creatinine     Acute kidney injury on CKD stage 3  Baseline creatinine is around 1.2-1.3  - PTA regimen does include bumex and spironolactone  - This might be due to decreased oral intake along with use of diuretics " "and inuprofen use   - Hold ibuprofen, bumex and spironolactone  - Received IVF  - This morning 1.25     Observation Goals: -diagnostic tests and consults completed and resulted, -vital signs normal or at patient baseline, -adequate pain control on oral analgesics, -safe disposition plan has been identified, Nurse to notify provider when observation goals have been met and patient is ready for discharge.  Diet: Regular Diet Adult  Room Service    DVT Prophylaxis: subcutaneous heparin   Ferraro Catheter: Not present  Lines: None     Cardiac Monitoring: None  Code Status: No CPR- Do NOT Intubate      Clinically Significant Risk Factors Present on Admission                  # Acute Kidney Injury, unspecified: based on a >150% or 0.3 mg/dL increase in last creatinine compared to past 90 day average, will monitor renal function  # Hypertension: Noted on problem list           # Obesity: Estimated body mass index is 33.29 kg/m  as calculated from the following:    Height as of this encounter: 1.575 m (5' 2\").    Weight as of this encounter: 82.6 kg (182 lb).              Social Drivers of Health    Housing Stability: High Risk (12/29/2024)    Housing Stability     Do you have housing? : No     Are you worried about losing your housing?: No   Tobacco Use: Medium Risk (11/1/2024)    Received from [employer]    Patient History     Smoking Tobacco Use: Former     Smokeless Tobacco Use: Never    [facility]    Social Connections          Disposition Plan   Medically Ready for Discharge: Anticipated Tomorrow    The patient's care was discussed with the Attending Physician, Dr. Larkin .    Annetta Lemus PA-C  Hospitalist Service  [facility]  Securely message with [employer] (more info)  Text page via [facility] Paging/Directory   ______________________________________________________________________    Interval History   Patient seen " and examined. No acute events overnight. Pain controlled. Stable on room air. She denies fevers, chills. No N/V/D/C/abd pain. No CP, palpitations, SOB, lightheadedness, or dizziness. Questions welcomed and answered. POC discussed.     Physical Exam   Vital Signs: Temp: 97.7  F (36.5  C) Temp src: Oral BP: (!) 143/93 Pulse: 51   Resp: 18 SpO2: 95 % O2 Device: None (Room air)    Weight: 182 lbs 0 oz   General: Awake, alert, 82 yo female, frail who appears stated age. Looks comfortable. No acute distress.  HEENT: Normocephalic, atraumatic  Respiratory: Clear to auscultation bilaterally, stable on room air, tender to rib fractures  Cardiovascular: Regular rate and rhythm  Gastrointestinal: Soft, non-tender, non-distended  Skin: Warm, dry. Dorsalis pedis pulses palpable bilaterally.  Musculoskeletal: Moves all extremities equally.  Neurologic: Alert, oriented, moves all extremities, normal strength and sensation.  Psychiatric: Appropriate mood and affect.     Medical Decision Making   45 MINUTES SPENT BY ME on the date of service doing chart review, history, exam, documentation & further activities per the note.      Data   ------------------------- PAST 24 HR DATA REVIEWED -----------------------------------------------    I have personally reviewed the following data over the past 24 hrs:    N/A  \   N/A   / N/A     137 101 40.2 (H) /  138 (H)   4.4 20 (L) 1.61 (H) \       Imaging results reviewed over the past 24 hrs:   No results found for this or any previous visit (from the past 24 hours).

## 2025-01-02 ENCOUNTER — APPOINTMENT (OUTPATIENT)
Dept: PHYSICAL THERAPY | Facility: CLINIC | Age: OVER 89
End: 2025-01-02
Payer: COMMERCIAL

## 2025-01-02 LAB
ANION GAP SERPL CALCULATED.3IONS-SCNC: 11 MMOL/L (ref 7–15)
BUN SERPL-MCNC: 26 MG/DL (ref 8–23)
CALCIUM SERPL-MCNC: 9 MG/DL (ref 8.8–10.4)
CHLORIDE SERPL-SCNC: 102 MMOL/L (ref 98–107)
CREAT SERPL-MCNC: 0.99 MG/DL (ref 0.51–0.95)
EGFRCR SERPLBLD CKD-EPI 2021: 53 ML/MIN/1.73M2
GLUCOSE SERPL-MCNC: 117 MG/DL (ref 70–99)
HCO3 SERPL-SCNC: 21 MMOL/L (ref 22–29)
POTASSIUM SERPL-SCNC: 3.8 MMOL/L (ref 3.4–5.3)
SODIUM SERPL-SCNC: 134 MMOL/L (ref 135–145)

## 2025-01-02 PROCEDURE — 250N000013 HC RX MED GY IP 250 OP 250 PS 637: Performed by: THORACIC SURGERY (CARDIOTHORACIC VASCULAR SURGERY)

## 2025-01-02 PROCEDURE — 99232 SBSQ HOSP IP/OBS MODERATE 35: CPT | Performed by: PHYSICIAN ASSISTANT

## 2025-01-02 PROCEDURE — G0378 HOSPITAL OBSERVATION PER HR: HCPCS

## 2025-01-02 PROCEDURE — 250N000013 HC RX MED GY IP 250 OP 250 PS 637: Performed by: HOSPITALIST

## 2025-01-02 PROCEDURE — 80048 BASIC METABOLIC PNL TOTAL CA: CPT | Performed by: PHYSICIAN ASSISTANT

## 2025-01-02 PROCEDURE — 250N000013 HC RX MED GY IP 250 OP 250 PS 637: Performed by: PHYSICIAN ASSISTANT

## 2025-01-02 PROCEDURE — 36415 COLL VENOUS BLD VENIPUNCTURE: CPT | Performed by: PHYSICIAN ASSISTANT

## 2025-01-02 PROCEDURE — 97110 THERAPEUTIC EXERCISES: CPT | Mod: GP

## 2025-01-02 PROCEDURE — 250N000011 HC RX IP 250 OP 636: Performed by: PHYSICIAN ASSISTANT

## 2025-01-02 PROCEDURE — 96372 THER/PROPH/DIAG INJ SC/IM: CPT | Performed by: PHYSICIAN ASSISTANT

## 2025-01-02 PROCEDURE — 97116 GAIT TRAINING THERAPY: CPT | Mod: GP

## 2025-01-02 RX ORDER — GUAIFENESIN 600 MG/1
600 TABLET, EXTENDED RELEASE ORAL 2 TIMES DAILY
Status: DISCONTINUED | OUTPATIENT
Start: 2025-01-02 | End: 2025-01-03 | Stop reason: HOSPADM

## 2025-01-02 RX ORDER — METHOCARBAMOL 500 MG/1
500 TABLET, FILM COATED ORAL 3 TIMES DAILY PRN
Status: DISCONTINUED | OUTPATIENT
Start: 2025-01-02 | End: 2025-01-03 | Stop reason: HOSPADM

## 2025-01-02 RX ORDER — GUAIFENESIN 200 MG/10ML
200 LIQUID ORAL EVERY 4 HOURS PRN
Status: DISCONTINUED | OUTPATIENT
Start: 2025-01-02 | End: 2025-01-03 | Stop reason: HOSPADM

## 2025-01-02 RX ADMIN — ACETAMINOPHEN 975 MG: 325 TABLET, FILM COATED ORAL at 03:54

## 2025-01-02 RX ADMIN — LIDOCAINE 2 PATCH: 4 PATCH TOPICAL at 08:56

## 2025-01-02 RX ADMIN — CARVEDILOL 25 MG: 25 TABLET, FILM COATED ORAL at 18:35

## 2025-01-02 RX ADMIN — HEPARIN SODIUM 5000 UNITS: 5000 INJECTION, SOLUTION INTRAVENOUS; SUBCUTANEOUS at 10:08

## 2025-01-02 RX ADMIN — CARVEDILOL 25 MG: 25 TABLET, FILM COATED ORAL at 08:56

## 2025-01-02 RX ADMIN — HEPARIN SODIUM 5000 UNITS: 5000 INJECTION, SOLUTION INTRAVENOUS; SUBCUTANEOUS at 03:53

## 2025-01-02 RX ADMIN — GUAIFENESIN 600 MG: 600 TABLET, EXTENDED RELEASE ORAL at 21:31

## 2025-01-02 RX ADMIN — MAGNESIUM HYDROXIDE 30 ML: 400 SUSPENSION ORAL at 08:57

## 2025-01-02 RX ADMIN — SENNOSIDES 2 TABLET: 8.6 TABLET, FILM COATED ORAL at 21:31

## 2025-01-02 RX ADMIN — METHOCARBAMOL 500 MG: 500 TABLET ORAL at 08:57

## 2025-01-02 RX ADMIN — ACETAMINOPHEN 975 MG: 325 TABLET, FILM COATED ORAL at 16:00

## 2025-01-02 RX ADMIN — BUMETANIDE 2 MG: 1 TABLET ORAL at 08:57

## 2025-01-02 RX ADMIN — ACETAMINOPHEN 975 MG: 325 TABLET, FILM COATED ORAL at 21:30

## 2025-01-02 RX ADMIN — HEPARIN SODIUM 5000 UNITS: 5000 INJECTION, SOLUTION INTRAVENOUS; SUBCUTANEOUS at 18:35

## 2025-01-02 RX ADMIN — ACETAMINOPHEN 975 MG: 325 TABLET, FILM COATED ORAL at 10:07

## 2025-01-02 RX ADMIN — PRAVASTATIN SODIUM 40 MG: 40 TABLET ORAL at 21:31

## 2025-01-02 RX ADMIN — OXYCODONE HYDROCHLORIDE 5 MG: 5 TABLET ORAL at 02:31

## 2025-01-02 ASSESSMENT — ACTIVITIES OF DAILY LIVING (ADL)
ADLS_ACUITY_SCORE: 60
ADLS_ACUITY_SCORE: 56
ADLS_ACUITY_SCORE: 56
ADLS_ACUITY_SCORE: 58
ADLS_ACUITY_SCORE: 60
ADLS_ACUITY_SCORE: 60
ADLS_ACUITY_SCORE: 58
ADLS_ACUITY_SCORE: 60
ADLS_ACUITY_SCORE: 56
ADLS_ACUITY_SCORE: 60
ADLS_ACUITY_SCORE: 58
ADLS_ACUITY_SCORE: 60
ADLS_ACUITY_SCORE: 60
ADLS_ACUITY_SCORE: 57
ADLS_ACUITY_SCORE: 58
ADLS_ACUITY_SCORE: 60
ADLS_ACUITY_SCORE: 60

## 2025-01-02 NOTE — PLAN OF CARE
Goal Outcome Evaluation:      Plan of Care Reviewed With: patient    Overall Patient Progress: no changeOverall Patient Progress: no change      Observation goals  PRIOR TO DISCHARGE        Comments:   -diagnostic tests and consults completed and resulted: Partially met, SW following  -vital signs normal or at patient baseline: Met  -adequate pain control on oral analgesics; Partially met  -safe disposition plan has been identified: Not met  Nurse to notify provider when observation goals have been met and patient is ready for discharge.

## 2025-01-02 NOTE — PLAN OF CARE
Goal Outcome Evaluation:      Plan of Care Reviewed With: patient    Overall Patient Progress: improvingOverall Patient Progress: improving    Observation goals  PRIOR TO DISCHARGE        Comments:   -diagnostic tests and consults completed and resulted: Partially met, SW following  -vital signs normal or at patient baseline: Met  -adequate pain control on oral analgesics; Partially met  -safe disposition plan has been identified: Not met  Nurse to notify provider when observation goals have been met and patient is ready for discharge     1/2/25; 2532-1170  PRIMARY Concern: fall, L 4th and 10th rib fx  SAFETY RISK Concerns (fall risk, behaviors, etc.): fall risk  Aggression Tool Color: Green  Isolation/Type: n/a  Tests/Procedures for NEXT shift: n/a  Consults? (Pending/following, signed-off?)  Thoracic surgery signed off; PT/SW following.   Where is patient from? (Home, TCU, etc.): [facility]  Other Important info for NEXT shift: notes some mucous congestion, encouraged splinted coughing. Will start mucinex this evening. Using IS  Anticipated DC date & active delays: TBD. TCU placement, ins auth pending.      SUMMARY NOTE:  Orientation/Cognitive: Ax04, forgetful, [place].   Observation Goals (Met/ Not Met): not met  Mobility Level/Assist Equipment: Ax1 gb/W  Antibiotics & Plan (IV/po, length of tx left): n/a  Pain Management: Scheduled tylenol, lido patches. Prn robaxin and oxy available. Robaxin switched from schedule to prn d/t sleepiness.   Tele/VS/O2: VSS on RA  ABNL Lab/BG: Na 131.   Diet: reg  Bowel/Bladder: occasionally incont urine, voiding well. No BM this shift. Continue with stool softeners.   Skin Concerns: scattered bruises/edema LLE, redness on bottom  Drains/Devices: PIV SL.   Patient Stated Goal for Today: pain control.

## 2025-01-02 NOTE — PROGRESS NOTES
Observation goals  PRIOR TO DISCHARGE        Comments: -diagnostic tests and consults completed and resulted: Yes  -vital signs normal or at patient baseline: Yes  -adequate pain control on oral analgesics: In process  -safe disposition plan has been identified- Yes, awaiting placement  Nurse to notify provider when observation goals have been met and patient is ready for discharge.

## 2025-01-02 NOTE — PLAN OF CARE
Status: Patient admitted on 12/29 after fall resulting in multiple rib fractures on the left side  Vitals: VSS  Tele:  Neuros: Intact ex [place] and generalized weakness  IV: PIV saline locked  Labs/Electrolytes: WNL ex  Na 131, creatinine 1.25, trending down  Resp/trach: Lung sounds diminished in lower lobes, IS at bedside  Diet: Regular, poor PO intake  Bowel status: Last BM 12/30, patient reports constipation, BS hypoactive, Senna and Milk of Magnesia given  : Voiding spontaneously  Skin: Intact ex blanchable redness to coccyx, generalized bruising, RLQ abdominal hernia   Pain: Patient c/o left flank pain relieved with Tylenol, Robaxin and Lidoderm patches and Oxycodone  Activity: Up with A1, walker, gaitbelt  Social: Cooperative with cares, daughter and son at bedside today, supportive with cares  Plan: Discharge to TCU pending placement, continue to monitor and follow POC      Goal Outcome Evaluation:      Plan of Care Reviewed With: patient, child    Overall Patient Progress: improving  Overall Patient Progress: improving

## 2025-01-02 NOTE — PROGRESS NOTES
Care Management Follow Up    Length of Stay (days): 0    Expected Discharge Date: 01/02/2025     Concerns to be Addressed:       Patient plan of care discussed at interdisciplinary rounds: Yes    Anticipated Discharge Disposition: Transitional Care              Anticipated Discharge Services: Transportation Services  Anticipated Discharge DME:      Patient/family educated on Medicare website which has current facility and service quality ratings: yes  Education Provided on the Discharge Plan:    Patient/Family in Agreement with the Plan: yes    Referrals Placed by CM/SW:    Private pay costs discussed: Not applicable    Discussed  Partnership in Safe Discharge Planning  document with patient/family: No     Handoff Completed: No, handoff not indicated or clinically appropriate    Additional Information:  Writer faxed a message to all pending 3 locations requesting information on bed availability. Waiting a response back.     Next Steps: TCU placement    Francis ROMAN  [facility]  Inpatient Care Coordination

## 2025-01-02 NOTE — PLAN OF CARE
PRIMARY Concern: fall, found multiple rib fracture  SAFETY RISK Concerns (fall risk, behaviors, etc.): fall risk  Aggression Tool Color: n/a  Isolation/Type: n/a  Tests/Procedures for NEXT shift:   Consults? (Pending/following, signed-off?)  Thoracic surgery signed off  Where is patient from? (Home, TCU, etc.): [facility]  Other Important info for NEXT shift: Subtle nondisplaced fractures involving the left fourth and left 10th ribs.   Anticipated DC date & active delays: TBD. Pending clinical improvement      SUMMARY NOTE:  Orientation/Cognitive: Ax04, forgetful, hard of hearing   Observation Goals (Met/ Not Met): not met  Mobility Level/Assist Equipment: Ax1 gb/W  Antibiotics & Plan (IV/po, length of tx left): n/a  Pain Management: Complained of back pain. PRN Oxycodone, schedule Tylenol q6. Up in chair.   Tele/VS/O2: VSS on RA  ABNL Lab/BG: Creatinine 1.10  Diet: reg  Bowel/Bladder: cont  Skin Concerns: scattered bruises/edema LLE, redness on bottom  Drains/Devices: PIV SL  Patient Stated Goal for Today: Sleep

## 2025-01-02 NOTE — PROGRESS NOTES
"[facility]    Medicine Progress Note - Hospitalist Service    Date of Admission:  12/29/2024    Assessment & Plan   Deborah \"Tammi\" Neftaly is a 89-year-old female with past medical history significant for hypertension, hyperlipidemia and CKD stage III who lives at assisted facility and presented to [facility] ED on 12/29/2024 with chief complaint of fall likely mechanical and was diagnosed with multiple rib fractures after she complained of left-sided chest pain. [facility], respiratory monitoring and pain control.     Left 4th and 10th Rib Fractures secondary to mechanical fall  - Chest x-ray showed acute mildly displaced fractures of the anterior left fifth, sixth, seventh, eighth, and ninth ribs. Question trace left pleural fluid. No significant pneumothorax. Bones are demineralized. Thoracolumbar kyphoscoliosis   - CT chest revealed subtle nondisplaced fracture involving the left fourth and 10th rib and several old bilateral rib fractures and old sternal fracture  - Multimodal pain regimen with with tylenol, robaxin and lidocaine patch and PRN oxycodone. Decrease robaxin to PRN.   - PT/OT consulted and will need TCU   - Encourage deep breaths, activity as tolerated and incentive spirometry  - Monitor on continuous oximetry  - Remains stable on room air  - Medically stable to discharge, accepted to [facility] TCU and awaiting insurance auth     Hyponatremia, mild  Likely related to holding patient's PTA diuretics, hypervolemic hyponatremia. I&O with +480 this admit  - Restarted PTA Bumex   - Monitor      Latest Reference Range & Units 12/31/24 11:03 01/01/25 08:40 01/02/25 11:40   Sodium 135 - 145 mmol/L 137 131 (L) 134 (L)   (L): Data is abnormally low     Pertinent medical history:  Hypertension - continue PTA Coreg with hold parameters  Hyperlipidemia - continue PTA statin  Edema - restart Bumex; holding PTA " Great news! There were no areas of concern on your imaging. You need a repeat mammogram in August 2025.  Please let me know if you have any questions via portal or you can call [phone] to speak to a nurse. Be well, Dr. Nguyen     "spironolactone and potassium given elevated creatinine and can resume on 1/3/25      Acute kidney injury on CKD stage 3, improving  Baseline creatinine is around 1.2-1.3  Cr 1.19-1.10-1.61-1.25, back to baseline s/p IVF. This might be due to decreased oral intake along with use of diuretics and inuprofen use.  PTA regimen includes bumex and spironolactone  - Hold ibuprofen, bumex and spironolactone, restart 1/3/25  - Monitor  - Avoid nephrotoxins       Observation Goals: -diagnostic tests and consults completed and resulted, -vital signs normal or at patient baseline, -adequate pain control on oral analgesics, -safe disposition plan has been identified, Nurse to notify provider when observation goals have been met and patient is ready for discharge.  Diet: Regular Diet Adult  Room Service    DVT Prophylaxis: Heparin SQ  Ferraro Catheter: Not present  Lines: None     Cardiac Monitoring: None  Code Status: No CPR- Do NOT Intubate      Clinically Significant Risk Factors Present on Admission         # Hyponatremia: Lowest Na = 131 mmol/L in last 2 days, will monitor as appropriate           # Hypertension: Noted on problem list           # Obesity: Estimated body mass index is 33.29 kg/m  as calculated from the following:    Height as of this encounter: 1.575 m (5' 2\").    Weight as of this encounter: 82.6 kg (182 lb).              Social Drivers of Health    Housing Stability: High Risk (12/29/2024)    Housing Stability     Do you have housing? : No     Are you worried about losing your housing?: No   Tobacco Use: Medium Risk (11/1/2024)    [facility]    Patient History     Smoking Tobacco Use: Former     Smokeless Tobacco Use: Never    Received from [employer] [facility]    Social Connections          Disposition Plan     Medically Ready for Discharge: Ready Now           The patient's care was discussed with the Attending Physician, Dr. Bach, " Bedside Nurse, Care Coordinator/, Patient, and Patient's Family.    Crystal Falcon PA-C  Hospitalist Service  [facility]  Securely message with [employer] (more info)  Text page via [employer] Paging/Directory   ______________________________________________________________________    Interval History   Seen and examined. No new complaints. Sleepy after robaxin. Called and updated daughter. Accepted to [facility], awaiting insurance authorization. Medically stable to discharge. Questions welcomed and answered to the best of my knowledge.    Physical Exam   Vital Signs: Temp: 97.6  F (36.4  C) Temp src: Oral BP: 128/62 Pulse: 65   Resp: 16 SpO2: 95 % O2 Device: None (Room air)    Weight: 182 lbs 0 oz    Physical Exam    General: Awake, alert, very pleasant woman who appears stated age. Looks comfortable sitting up in chair. No acute distress.  HEENT: Normocephalic, atraumatic. Extraocular movements intact.   Respiratory: Clear to auscultation bilaterally, no rales, wheezing, or rhonchi.  Cardiovascular: Regular rate and rhythm, +S1 and S2, no murmur auscultated. 1+ peripheral edema.   Gastrointestinal: Soft, non-tender, non-distended. Bowel sounds present.  Skin: Warm, dry. No obvious rashes or lesions on exposed skin. Dorsalis pedis pulses palpable bilaterally.  Musculoskeletal: TTP to left side of rib cage  Neurologic: AAO x3.  Psychiatric: Appropriate mood and affect. No obvious anxiety or depression.      Medical Decision Making       >35 MINUTES SPENT BY ME on the date of service doing chart review, history, exam, documentation & further activities per the note.      Data     I have personally reviewed the following data over the past 24 hrs:    N/A  \   N/A   / N/A     134 (L) 102 26.0 (H) /  117 (H)   3.8 21 (L) 0.99 (H) \       Imaging results reviewed over the past 24 hrs:   No results found for this or any previous visit (from the past 24 hours).

## 2025-01-02 NOTE — PROGRESS NOTES
Care Management Follow Up    Length of Stay (days): 0    Expected Discharge Date: 01/02/2025     Concerns to be Addressed:       Patient plan of care discussed at interdisciplinary rounds: Yes    Anticipated Discharge Disposition: Transitional Care    Anticipated Discharge Services: Transportation Services  Anticipated Discharge DME:      Patient/family educated on Medicare website which has current facility and service quality ratings: yes  Education Provided on the Discharge Plan:    Patient/Family in Agreement with the Plan: yes    Referrals Placed by CM/SW:    Private pay costs discussed: Not applicable    Discussed  Partnership in Safe Discharge Planning  document with patient/family: No     Handoff Completed: No, handoff not indicated or clinically appropriate    Additional Information:  Received e-mail from gabriella Warren asking if Medicare is covering the TCU stay or [employer]. Explained to daughter that pt has a [employer] Medicare Advantage plan, so [employer] manages pt's Medicare. Explained that pt would need insurance authorization for insurance to approve TCU stay. Updated that so far, we have not heard back from the TCU referrals, they are pending.    Addendum 1122: Received another e-mail from Briana asking if Medicare pays for assisted living facilities. Explained to Briana the ways in which [facility] could be paid for (private pay, [facility] paying for services). Asked Briana if she has more TCU choices since we have two pending referrals and one decline so far.   Received e-mail back with request to send referral to [facility].  [facility]. Spoke with gabriella Warren who is in agreement. She understands auth will be started and once approved, pt can then go to TCU. Discussed possible plan for pt to transition to [facility] where they live, at [facility]. Daughter has been actively looking into this. SW to send private pay resources to daughter by e-mail. Briana  understands we will be in touch further once there's news on auth. Briana has some medical questions for the hospitalist. Updated hospitalist.     Genevieve Guzman, JESSIE  Social Work  [facility]

## 2025-01-03 ENCOUNTER — HOSPITAL ENCOUNTER (OUTPATIENT)
Facility: CLINIC | Age: OVER 89
Discharge: HOME | End: 2025-01-09
Attending: HOSPITALIST | Admitting: HOSPITALIST
Payer: COMMERCIAL

## 2025-01-03 ENCOUNTER — APPOINTMENT (OUTPATIENT)
Dept: PHYSICAL THERAPY | Facility: CLINIC | Age: OVER 89
End: 2025-01-03
Payer: COMMERCIAL

## 2025-01-03 VITALS
HEART RATE: 57 BPM | OXYGEN SATURATION: 95 % | BODY MASS INDEX: 33.49 KG/M2 | HEIGHT: 62 IN | TEMPERATURE: 97.6 F | WEIGHT: 182 LBS | RESPIRATION RATE: 18 BRPM | SYSTOLIC BLOOD PRESSURE: 97 MMHG | DIASTOLIC BLOOD PRESSURE: 56 MMHG

## 2025-01-03 VITALS
HEART RATE: 58 BPM | BODY MASS INDEX: 33.49 KG/M2 | TEMPERATURE: 98 F | SYSTOLIC BLOOD PRESSURE: 119 MMHG | DIASTOLIC BLOOD PRESSURE: 68 MMHG | WEIGHT: 182 LBS | OXYGEN SATURATION: 96 % | HEIGHT: 62 IN | RESPIRATION RATE: 16 BRPM

## 2025-01-03 DIAGNOSIS — S22.42XA CLOSED FRACTURE OF MULTIPLE RIBS OF LEFT SIDE, INITIAL ENCOUNTER: Primary | ICD-10-CM

## 2025-01-03 LAB — PLATELET # BLD AUTO: 185 10E3/UL (ref 150–450)

## 2025-01-03 PROCEDURE — 250N000013 HC RX MED GY IP 250 OP 250 PS 637: Performed by: PHYSICIAN ASSISTANT

## 2025-01-03 PROCEDURE — 99239 HOSP IP/OBS DSCHRG MGMT >30: CPT | Performed by: PHYSICIAN ASSISTANT

## 2025-01-03 PROCEDURE — 96372 THER/PROPH/DIAG INJ SC/IM: CPT | Performed by: PHYSICIAN ASSISTANT

## 2025-01-03 PROCEDURE — 85049 AUTOMATED PLATELET COUNT: CPT

## 2025-01-03 PROCEDURE — 250N000013 HC RX MED GY IP 250 OP 250 PS 637: Performed by: HOSPITALIST

## 2025-01-03 PROCEDURE — 36415 COLL VENOUS BLD VENIPUNCTURE: CPT

## 2025-01-03 PROCEDURE — 99207 PR NO BILLABLE SERVICE THIS VISIT: CPT | Performed by: PHYSICIAN ASSISTANT

## 2025-01-03 PROCEDURE — 101N000002 HC CUSTODIAL CARE DAILY

## 2025-01-03 PROCEDURE — G0378 HOSPITAL OBSERVATION PER HR: HCPCS

## 2025-01-03 PROCEDURE — 250N000011 HC RX IP 250 OP 636: Performed by: PHYSICIAN ASSISTANT

## 2025-01-03 PROCEDURE — 250N000013 HC RX MED GY IP 250 OP 250 PS 637: Performed by: THORACIC SURGERY (CARDIOTHORACIC VASCULAR SURGERY)

## 2025-01-03 PROCEDURE — 97116 GAIT TRAINING THERAPY: CPT | Mod: GP

## 2025-01-03 PROCEDURE — 97530 THERAPEUTIC ACTIVITIES: CPT | Mod: GP

## 2025-01-03 RX ORDER — ACETAMINOPHEN 325 MG/1
975 TABLET ORAL EVERY 6 HOURS
Status: CANCELLED | OUTPATIENT
Start: 2025-01-03

## 2025-01-03 RX ORDER — NALOXONE HYDROCHLORIDE 0.4 MG/ML
0.4 INJECTION, SOLUTION INTRAMUSCULAR; INTRAVENOUS; SUBCUTANEOUS
Status: DISCONTINUED | OUTPATIENT
Start: 2025-01-03 | End: 2025-01-09 | Stop reason: HOSPADM

## 2025-01-03 RX ORDER — POTASSIUM CHLORIDE 750 MG/1
10 TABLET, EXTENDED RELEASE ORAL 2 TIMES DAILY
Status: CANCELLED | OUTPATIENT
Start: 2025-01-04

## 2025-01-03 RX ORDER — SPIRONOLACTONE 25 MG/1
25 TABLET ORAL DAILY
Status: CANCELLED | OUTPATIENT
Start: 2025-01-04

## 2025-01-03 RX ORDER — NALOXONE HYDROCHLORIDE 0.4 MG/ML
0.2 INJECTION, SOLUTION INTRAMUSCULAR; INTRAVENOUS; SUBCUTANEOUS
Status: CANCELLED | OUTPATIENT
Start: 2025-01-03

## 2025-01-03 RX ORDER — BENZONATATE 100 MG/1
100 CAPSULE ORAL 3 TIMES DAILY PRN
Status: CANCELLED | OUTPATIENT
Start: 2025-01-03

## 2025-01-03 RX ORDER — METHOCARBAMOL 500 MG/1
500 TABLET, FILM COATED ORAL 3 TIMES DAILY PRN
Status: DISCONTINUED | OUTPATIENT
Start: 2025-01-03 | End: 2025-01-09 | Stop reason: HOSPADM

## 2025-01-03 RX ORDER — LIDOCAINE 4 G/G
2 PATCH TOPICAL
Status: DISCONTINUED | OUTPATIENT
Start: 2025-01-04 | End: 2025-01-09 | Stop reason: HOSPADM

## 2025-01-03 RX ORDER — ACETAMINOPHEN 325 MG/1
650 TABLET ORAL EVERY 12 HOURS PRN
Status: CANCELLED | OUTPATIENT
Start: 2025-01-03

## 2025-01-03 RX ORDER — ACETAMINOPHEN 325 MG/1
650 TABLET ORAL EVERY 12 HOURS PRN
Status: DISCONTINUED | OUTPATIENT
Start: 2025-01-03 | End: 2025-01-08

## 2025-01-03 RX ORDER — ONDANSETRON 4 MG/1
4 TABLET, ORALLY DISINTEGRATING ORAL EVERY 6 HOURS PRN
Status: DISCONTINUED | OUTPATIENT
Start: 2025-01-03 | End: 2025-01-09 | Stop reason: HOSPADM

## 2025-01-03 RX ORDER — AMOXICILLIN 250 MG
2 CAPSULE ORAL 2 TIMES DAILY PRN
Status: DISCONTINUED | OUTPATIENT
Start: 2025-01-03 | End: 2025-01-09 | Stop reason: HOSPADM

## 2025-01-03 RX ORDER — PRAVASTATIN SODIUM 40 MG
40 TABLET ORAL AT BEDTIME
Status: DISCONTINUED | OUTPATIENT
Start: 2025-01-03 | End: 2025-01-09 | Stop reason: HOSPADM

## 2025-01-03 RX ORDER — PROCHLORPERAZINE MALEATE 5 MG/1
5 TABLET ORAL EVERY 6 HOURS PRN
Status: CANCELLED | OUTPATIENT
Start: 2025-01-03

## 2025-01-03 RX ORDER — GUAIFENESIN 600 MG/1
600 TABLET, EXTENDED RELEASE ORAL 2 TIMES DAILY
Status: DISCONTINUED | OUTPATIENT
Start: 2025-01-03 | End: 2025-01-09 | Stop reason: HOSPADM

## 2025-01-03 RX ORDER — METHOCARBAMOL 500 MG/1
500 TABLET, FILM COATED ORAL 3 TIMES DAILY PRN
Status: CANCELLED | OUTPATIENT
Start: 2025-01-03

## 2025-01-03 RX ORDER — ACETAMINOPHEN 325 MG/1
975 TABLET ORAL EVERY 6 HOURS
Status: DISCONTINUED | OUTPATIENT
Start: 2025-01-03 | End: 2025-01-09 | Stop reason: HOSPADM

## 2025-01-03 RX ORDER — GUAIFENESIN 200 MG/10ML
200 LIQUID ORAL EVERY 4 HOURS PRN
Status: DISCONTINUED | OUTPATIENT
Start: 2025-01-03 | End: 2025-01-09 | Stop reason: HOSPADM

## 2025-01-03 RX ORDER — SPIRONOLACTONE 25 MG/1
25 TABLET ORAL DAILY
Status: DISCONTINUED | OUTPATIENT
Start: 2025-01-04 | End: 2025-01-09 | Stop reason: HOSPADM

## 2025-01-03 RX ORDER — NALOXONE HYDROCHLORIDE 0.4 MG/ML
0.4 INJECTION, SOLUTION INTRAMUSCULAR; INTRAVENOUS; SUBCUTANEOUS
Status: CANCELLED | OUTPATIENT
Start: 2025-01-03

## 2025-01-03 RX ORDER — LIDOCAINE 4 G/G
2 PATCH TOPICAL
Status: CANCELLED | OUTPATIENT
Start: 2025-01-04

## 2025-01-03 RX ORDER — BENZONATATE 100 MG/1
100 CAPSULE ORAL 3 TIMES DAILY PRN
Status: DISCONTINUED | OUTPATIENT
Start: 2025-01-03 | End: 2025-01-09 | Stop reason: HOSPADM

## 2025-01-03 RX ORDER — AMOXICILLIN 250 MG
1 CAPSULE ORAL 2 TIMES DAILY PRN
Status: DISCONTINUED | OUTPATIENT
Start: 2025-01-03 | End: 2025-01-09 | Stop reason: HOSPADM

## 2025-01-03 RX ORDER — OXYCODONE HYDROCHLORIDE 5 MG/1
5 TABLET ORAL EVERY 4 HOURS PRN
Status: CANCELLED | OUTPATIENT
Start: 2025-01-03

## 2025-01-03 RX ORDER — NALOXONE HYDROCHLORIDE 0.4 MG/ML
0.2 INJECTION, SOLUTION INTRAMUSCULAR; INTRAVENOUS; SUBCUTANEOUS
Status: DISCONTINUED | OUTPATIENT
Start: 2025-01-03 | End: 2025-01-09 | Stop reason: HOSPADM

## 2025-01-03 RX ORDER — PROCHLORPERAZINE MALEATE 5 MG/1
5 TABLET ORAL EVERY 6 HOURS PRN
Status: DISCONTINUED | OUTPATIENT
Start: 2025-01-03 | End: 2025-01-09 | Stop reason: HOSPADM

## 2025-01-03 RX ORDER — CARVEDILOL 25 MG/1
25 TABLET ORAL 2 TIMES DAILY WITH MEALS
Status: DISCONTINUED | OUTPATIENT
Start: 2025-01-03 | End: 2025-01-09 | Stop reason: HOSPADM

## 2025-01-03 RX ORDER — ONDANSETRON 2 MG/ML
4 INJECTION INTRAMUSCULAR; INTRAVENOUS EVERY 6 HOURS PRN
Status: CANCELLED | OUTPATIENT
Start: 2025-01-03

## 2025-01-03 RX ORDER — POTASSIUM CHLORIDE 750 MG/1
10 TABLET, EXTENDED RELEASE ORAL 2 TIMES DAILY
Status: DISCONTINUED | OUTPATIENT
Start: 2025-01-04 | End: 2025-01-09 | Stop reason: HOSPADM

## 2025-01-03 RX ORDER — SENNOSIDES 8.6 MG
2 TABLET ORAL AT BEDTIME
Status: DISCONTINUED | OUTPATIENT
Start: 2025-01-03 | End: 2025-01-09 | Stop reason: HOSPADM

## 2025-01-03 RX ORDER — SENNOSIDES 8.6 MG
2 TABLET ORAL AT BEDTIME
Status: CANCELLED | OUTPATIENT
Start: 2025-01-03

## 2025-01-03 RX ORDER — ONDANSETRON 4 MG/1
4 TABLET, ORALLY DISINTEGRATING ORAL EVERY 6 HOURS PRN
Status: CANCELLED | OUTPATIENT
Start: 2025-01-03

## 2025-01-03 RX ORDER — BUMETANIDE 1 MG/1
2 TABLET ORAL DAILY
Status: DISCONTINUED | OUTPATIENT
Start: 2025-01-04 | End: 2025-01-09 | Stop reason: HOSPADM

## 2025-01-03 RX ORDER — BENZONATATE 100 MG/1
100 CAPSULE ORAL 3 TIMES DAILY PRN
Status: DISCONTINUED | OUTPATIENT
Start: 2025-01-03 | End: 2025-01-03 | Stop reason: HOSPADM

## 2025-01-03 RX ORDER — GUAIFENESIN 600 MG/1
600 TABLET, EXTENDED RELEASE ORAL 2 TIMES DAILY
Status: CANCELLED | OUTPATIENT
Start: 2025-01-03

## 2025-01-03 RX ORDER — HEPARIN SODIUM 5000 [USP'U]/.5ML
5000 INJECTION, SOLUTION INTRAVENOUS; SUBCUTANEOUS EVERY 8 HOURS
Status: CANCELLED | OUTPATIENT
Start: 2025-01-03

## 2025-01-03 RX ORDER — PRAVASTATIN SODIUM 40 MG
40 TABLET ORAL AT BEDTIME
Status: CANCELLED | OUTPATIENT
Start: 2025-01-03

## 2025-01-03 RX ORDER — ONDANSETRON 2 MG/ML
4 INJECTION INTRAMUSCULAR; INTRAVENOUS EVERY 6 HOURS PRN
Status: DISCONTINUED | OUTPATIENT
Start: 2025-01-03 | End: 2025-01-09 | Stop reason: HOSPADM

## 2025-01-03 RX ORDER — BUMETANIDE 1 MG/1
2 TABLET ORAL DAILY
Status: CANCELLED | OUTPATIENT
Start: 2025-01-04

## 2025-01-03 RX ORDER — GUAIFENESIN 200 MG/10ML
200 LIQUID ORAL EVERY 4 HOURS PRN
Status: CANCELLED | OUTPATIENT
Start: 2025-01-03

## 2025-01-03 RX ORDER — OXYCODONE HYDROCHLORIDE 5 MG/1
5 TABLET ORAL EVERY 4 HOURS PRN
Status: DISCONTINUED | OUTPATIENT
Start: 2025-01-03 | End: 2025-01-09 | Stop reason: HOSPADM

## 2025-01-03 RX ORDER — HEPARIN SODIUM 5000 [USP'U]/.5ML
5000 INJECTION, SOLUTION INTRAVENOUS; SUBCUTANEOUS EVERY 8 HOURS
Status: DISCONTINUED | OUTPATIENT
Start: 2025-01-03 | End: 2025-01-09 | Stop reason: HOSPADM

## 2025-01-03 RX ORDER — AMOXICILLIN 250 MG
2 CAPSULE ORAL 2 TIMES DAILY PRN
Status: CANCELLED | OUTPATIENT
Start: 2025-01-03

## 2025-01-03 RX ORDER — AMOXICILLIN 250 MG
1 CAPSULE ORAL 2 TIMES DAILY PRN
Status: CANCELLED | OUTPATIENT
Start: 2025-01-03

## 2025-01-03 RX ORDER — CARVEDILOL 25 MG/1
25 TABLET ORAL 2 TIMES DAILY WITH MEALS
Status: CANCELLED | OUTPATIENT
Start: 2025-01-03

## 2025-01-03 RX ADMIN — CARVEDILOL 25 MG: 25 TABLET, FILM COATED ORAL at 18:01

## 2025-01-03 RX ADMIN — HEPARIN SODIUM 5000 UNITS: 5000 INJECTION, SOLUTION INTRAVENOUS; SUBCUTANEOUS at 02:41

## 2025-01-03 RX ADMIN — BUMETANIDE 2 MG: 1 TABLET ORAL at 08:16

## 2025-01-03 RX ADMIN — MAGNESIUM HYDROXIDE 30 ML: 400 SUSPENSION ORAL at 08:16

## 2025-01-03 RX ADMIN — GUAIFENESIN 600 MG: 600 TABLET, EXTENDED RELEASE ORAL at 20:14

## 2025-01-03 RX ADMIN — HEPARIN SODIUM 5000 UNITS: 5000 INJECTION, SOLUTION INTRAVENOUS; SUBCUTANEOUS at 10:49

## 2025-01-03 RX ADMIN — ACETAMINOPHEN 975 MG: 325 TABLET, FILM COATED ORAL at 10:49

## 2025-01-03 RX ADMIN — SENNOSIDES 2 TABLET: 8.6 TABLET, FILM COATED ORAL at 20:14

## 2025-01-03 RX ADMIN — METHOCARBAMOL 500 MG: 500 TABLET ORAL at 02:43

## 2025-01-03 RX ADMIN — ACETAMINOPHEN 975 MG: 325 TABLET, FILM COATED ORAL at 22:19

## 2025-01-03 RX ADMIN — CARVEDILOL 25 MG: 25 TABLET, FILM COATED ORAL at 08:16

## 2025-01-03 RX ADMIN — SPIRONOLACTONE 25 MG: 25 TABLET ORAL at 08:16

## 2025-01-03 RX ADMIN — ACETAMINOPHEN 975 MG: 325 TABLET, FILM COATED ORAL at 16:22

## 2025-01-03 RX ADMIN — HEPARIN SODIUM 5000 UNITS: 5000 INJECTION, SOLUTION INTRAVENOUS; SUBCUTANEOUS at 20:14

## 2025-01-03 RX ADMIN — OXYCODONE HYDROCHLORIDE 2.5 MG: 5 TABLET ORAL at 13:29

## 2025-01-03 RX ADMIN — ACETAMINOPHEN 975 MG: 325 TABLET, FILM COATED ORAL at 04:22

## 2025-01-03 RX ADMIN — PRAVASTATIN SODIUM 40 MG: 40 TABLET ORAL at 20:14

## 2025-01-03 RX ADMIN — LIDOCAINE 2 PATCH: 4 PATCH TOPICAL at 08:16

## 2025-01-03 RX ADMIN — GUAIFENESIN 600 MG: 600 TABLET, EXTENDED RELEASE ORAL at 08:16

## 2025-01-03 RX ADMIN — GUAIFENESIN 200 MG: 200 SOLUTION ORAL at 04:24

## 2025-01-03 RX ADMIN — POTASSIUM CHLORIDE 10 MEQ: 750 TABLET, EXTENDED RELEASE ORAL at 08:16

## 2025-01-03 RX ADMIN — POTASSIUM CHLORIDE 10 MEQ: 750 TABLET, EXTENDED RELEASE ORAL at 12:15

## 2025-01-03 ASSESSMENT — ACTIVITIES OF DAILY LIVING (ADL)
ADLS_ACUITY_SCORE: 57
ADLS_ACUITY_SCORE: 59
ADLS_ACUITY_SCORE: 62
ADLS_ACUITY_SCORE: 61
ADLS_ACUITY_SCORE: 57
ADLS_ACUITY_SCORE: 58
ADLS_ACUITY_SCORE: 62
ADLS_ACUITY_SCORE: 62
ADLS_ACUITY_SCORE: 57
ADLS_ACUITY_SCORE: 58
ADLS_ACUITY_SCORE: 62
ADLS_ACUITY_SCORE: 61
ADLS_ACUITY_SCORE: 57
ADLS_ACUITY_SCORE: 60
ADLS_ACUITY_SCORE: 61
ADLS_ACUITY_SCORE: 61

## 2025-01-03 NOTE — UTILIZATION REVIEW
Concurrent stay review; Secondary Review Determination - [facility]        Under the authority of the Utilization Management Committee, the utilization review process indicated a secondary review on the above patient.  The review outcome is based on review of the medical records, discussions with staff, and applying clinical experience noted on the date of the review.        (x) Outpatient penitentiary status is appropriate       RATIONALE FOR DETERMINATION:   The patient is a 89-year-old female admitted on 12/29/2024 for multiple rib fractures after a fall.  She apparently fell after tripping on a rug at her assisted living facility.  CT scan of the chest shows a left fourth and left 10th rib fracture.  Patient received acute hospital care and was deemed to be stable for discharge today according to notes in the chart.  The case was discussed with Ms. Crystal Falcon PA-C.  Patient was seen by  and the case is discussed with the patient's family through the  with identification of several TCU facilities available for transfer.  Payment request was submitted to [employer].  Apparently the family is resistant at this time for transfer pending review and desire for additional TCU potential sites.  Ms. Falcon will place you are jail orders in the chart and placed an order for care coordination.       The patient's family and patient have reviewed with acceptance of at least 1 facility.  This case was discussed with Crystal Falcon PA-C who is providing care for this patient.  Apparently, the family became uncertain if they wanted to transfer the patient and had been contacted and requested coverage from [employer] which is pending.  At this time, because the patient no longer requires acute hospital care, a [facility] recommendation is being made for status and Ms. Falcon is putting an order set for jail care and care coordination    Patient delayed discharge  is related to disposition, there is no medical necessity for inpatient admission at the time of this review. If there is a change in patient status, please resend for review.    The information on this document is developed by the utilization review team in order for the business office to ensure compliance.  This only denotes the appropriateness of proper admission status and does not reflect the quality of care rendered.       The definitions of Inpatient Status and Observation Status used in making the determination above are those provided in the CMS Coverage Manual, Chapter 1 and Chapter 6, section 70.4.       Sincerely,    Rich Falcon MD

## 2025-01-03 NOTE — PLAN OF CARE
Goal Outcome Evaluation:      Plan of Care Reviewed With: patient    Overall Patient Progress: no changeOverall Patient Progress: no change    Observation goals  PRIOR TO DISCHARGE        Comments:   -diagnostic tests and consults completed and resulted: Partially met, SW following  -vital signs normal or at patient baseline: Met  -adequate pain control on oral analgesics: Partially met  -safe disposition plan has been identified: Not met  Nurse to notify provider when observation goals have been met and patient is ready for discharge.     1/3/25; 5403-8250  PRIMARY Concern: fall, L 4th and 10th rib fx  SAFETY RISK Concerns (fall risk, behaviors, etc.): fall risk  Aggression Tool Color: Green  Isolation/Type: n/a  Tests/Procedures for NEXT shift: n/a  Consults? (Pending/following, signed-off?)  Thoracic surgery signed off; PT rec TCU. SW following for placement.  Where is patient from? (Home, TCU, etc.): [facility]  Other Important info for NEXT shift: Intermittent cough. Started on mucinex this shift. Encouraged IS use.  Anticipated DC date & active delays: TBD. TCU placement vs Home with HHC. Will likely tx to half-way care. Family refusing in network TCU's. Considering d/c'ing home with hired help and HHC.       SUMMARY NOTE:  Orientation/Cognitive: AOX4, forgetful, [place].   Observation Goals (Met/ Not Met): not met  Mobility Level/Assist Equipment: Ax1 gb/W  Antibiotics & Plan (IV/po, length of tx left): n/a  Pain Management: Scheduled tylenol, lido patches. PRN oxycodone given x1  Tele/VS/O2: VSS on RA  ABNL Lab/BG: Na 134.   Diet: Reg, poor appetite today  Bowel/Bladder: occasionally incont urine, voiding well. Ambulates to bathroom. No BM this shift. Continue with stool softeners.   Skin Concerns: scattered bruises/edema LLE, redness to coccyx. Compression stocking.  Drains/Devices: PIV SL.   Patient Stated Goal for Today: pain control.

## 2025-01-03 NOTE — PHARMACY-ADMISSION MEDICATION HISTORY
Admission medication history completed at . Please see Pharmacist Admission Medication History note from 12/29/2024.

## 2025-01-03 NOTE — PROGRESS NOTES
Care Management Follow Up    Length of Stay (days): 0    Expected Discharge Date: 01/04/2025     Concerns to be Addressed:       Patient plan of care discussed at interdisciplinary rounds: Yes    Anticipated Discharge Disposition: Transitional Care     Anticipated Discharge Services: Transportation Services  Anticipated Discharge DME:      Patient/family educated on Medicare website which has current facility and service quality ratings: yes  Education Provided on the Discharge Plan:    Patient/Family in Agreement with the Plan: yes    Referrals Placed by CM/SW:    Private pay costs discussed: Not applicable    Discussed  Partnership in Safe Discharge Planning  document with patient/family: Yes    Handoff Completed: No, handoff not indicated or clinically appropriate    Additional Information:  Spoke with gabriella Warren at bedside. Discussed likely change to skilled nursing care, explained what this means, and stated that insurance would likely not cover continued hospital stay. Explained that central Briana states she still does not want pt to go to any of the in network facilities with [employer]. SW asked if family can sty with patient. Briana may look into hiring private care for pt at home. She is still looking into any way that pt could go to a facility that is not in-network with [employer]. She is requesting a home care referral be sent to start that process in case they decide to take pt home. Explained home care services and amount available with the specialties. Daughter states understanding.    RNCC sent home care referral to [facility].    Addendum 1516: Received VM from daughter asking for a call back. Spoke with Briana, who again asked SW to check into [facility] facilities with the thought that they can pay co-pays or out of pocket fees with pt's PPO plan. Explained to Briana that all [facility] facilities did decline, and the reason being that their providers are not in network, but she asked SW to check again. SW also  "advised Briana to reach out to the facilities herself if she does not believe SW. Briana states she does have a message out to the facilities. Briana states she needs to be notified when pt officially changes to longterm status, as she will then want pt discharged.  Spoke with Maite at [facility] admissions, who explained that it will be a definite \"no\" due to the fact that no providers in any of their buildings will follow a [employer] patient.   Patient has been accepted to [facility].  Spoke with daughter to update on the above response from [facility]. Daughter very upset about this. Informed Briana that a home care agency has accepted pt. Briana asked if pt would be seen every day, to which CARLOTTA reminded her no, they are 1-3 times a week depending on service/need. Briana states pt is not ready to discharge yet today, asked SW if pt would change to longterm. CARLOTTA confirmed yes, it pt stays, they will change to longterm status in the hospital. Briana states this system is terrible. CARLOTTA stated yes, [employer] does limit the options of where patients can go. Reminded Briana that there were two accepting facilities and she declined both and refuses to give more options, so we do not have other options besides discharging today with home care, or transitioning to longterm and waiting for more choices from Briana. Briana continuing to be very frustrated by SW, to which CARLOTTA reminded her that they don't decide someone's status, SW is simply relaying information. Briana said she needs to call her brother, so SW left.    Addendum 1543: Briana called SW to ask how much a longterm stay costs in the hospital. SW stated they were not sure the amount but Briana can call the business office, provided that phone number. Briana asked for a referral to be sent to [facility]. Referral sent.    Addendum 1600: Called [facility], they have no beds available. Updated daughter, who started to cry and storm out of room. She then came back in and was " "upset, saying she doesn't know what to do. SW attempted to problem solve with options: discharge home with home care, stay with family, have [facility] assess on Monday for services, or stay in the hospital with the risk of nursing home care cost and continue to look for TCU. Daughter left room to call brother and was crying.     Addendum 1628: Passed daughter in hallway who said to SW \"fine, just discharge us then.\"    Addendum 1629: Daughter called SW and said how are we going to get a hold of the doctor for discharge. SW explained we would message them. Daughter then said brother wants them to stay in the hospital so pt is going tos keyla in hospital and continue to work on TCU. Daughter said she would provide choices to SW tomorrow. Explained to Briana that this exact SW cannot be reached on the weekend by e-mail, so she knows to call/ask for SW in person on Saturday.    Genevieve Guzman, JESSIE  Social Work  [facility]        "

## 2025-01-03 NOTE — PLAN OF CARE
PRIMARY Concern: fall, found multiple rib fracture  SAFETY RISK Concerns (fall risk, behaviors, etc.): fall risk  Aggression Tool Color: n/a  Isolation/Type: n/a  Tests/Procedures for NEXT shift:   Consults? (Pending/following, signed-off?)  Thoracic surgery signed off  Where is patient from? (Home, TCU, etc.): [facility]  Other Important info for NEXT shift: Encourage   Anticipated DC date & active delays: TBD. Pending TCU placement       SUMMARY NOTE:  Orientation/Cognitive: Ax04, forgetful, hard of hearing   Observation Goals (Met/ Not Met): not met  Mobility Level/Assist Equipment: Ax1 gb/W  Antibiotics & Plan (IV/po, length of tx left): n/a  Pain Management: Complained of back pain. PRN Oxycodone, schedule Tylenol q6.   Tele/VS/O2: VSS on RA  [facility] Lab/BG: Creatinine 0.99, Na 134  Diet: reg  Bowel/Bladder: cont  Skin Concerns: scattered bruises/edema LLE, redness on bottom  Drains/Devices: PIV SL  Patient Stated Goal for Today: Sleep

## 2025-01-03 NOTE — PROGRESS NOTES
Care Management Follow Up    Length of Stay (days): 0    Expected Discharge Date: 01/04/2025     Concerns to be Addressed:       Patient plan of care discussed at interdisciplinary rounds: Yes    Anticipated Discharge Disposition: Transitional Care              Anticipated Discharge Services: Transportation Services  Anticipated Discharge DME:      Patient/family educated on Medicare website which has current facility and service quality ratings: yes  Education Provided on the Discharge Plan:    Patient/Family in Agreement with the Plan: yes    Referrals Placed by CM/SW:    Private pay costs discussed: Not applicable    Discussed  Partnership in Safe Discharge Planning  document with patient/family: Yes:      Handoff Completed: No, handoff not indicated or clinically appropriate    Additional Information:  Home care referral sent to home care hub.    Next Steps: follow for discharge planning.    Marylou Park RN

## 2025-01-03 NOTE — H&P
"[facility]    History and Physical - Hospitalist Service       Date of [facility] Care Admission:  1/3/2025    Assessment & Plan   Deborah Higginbotham is a 89 year old female being transitioned to [facility] care on 1/3/2025. Please see the encounters from the same date for more details of her presentation and workup.    [facility] Care Admission.  - Social work consulting for assistance  - Medically stable to discharge, changing to [facility] status, awaiting family to decide on TCU and subsequent insurance authorization vs now family considering home care    Left 4th and 10th Rib Fractures secondary to mechanical fall  Physical deconditioning  Presented with mechanical fall. CXR showed acute mildly displaced fractures of the anterior left fifth, sixth, seventh, eighth, and ninth ribs. Question trace left pleural fluid. No significant pneumothorax. Bones are demineralized. Thoracolumbar kyphoscoliosis. CT chest revealed subtle nondisplaced fracture involving the left fourth and 10th rib and several old bilateral rib fractures and old sternal fracture.   - Multimodal pain regimen with with tylenol, robaxin and lidocaine patch and PRN oxycodone. Decreased robaxin to PRN as it  makes patient quite sleepy.   - PT/OT consulted and recommending TCU   - Encourage deep breaths, activity as tolerated and incentive spirometry  - Remains stable on room air     Acute kidney injury on CKD stage 3, improving  Baseline creatinine is around 1.2-1.3.  Cr 1.19-1.10-1.61-1.25, back to baseline s/p IVF. This might be due to decreased oral intake along with use of diuretics and inuprofen use.  PTA regimen includes bumex and spironolactone  - Avoid NSAIDs  - Resumed PTA Bumex and spironolactone, monitor BP closely, hold parameters in place  - Avoid nephrotoxins  - Monitor PRN     Congestion  Reports morning congestion for a \"long time\". More difficult to clear mucous with rib fractures. Improving.  - Guaifenesin " "BID and PRN  - Tessalon pearles TID PRN  - IS/Flutter valve use frequently     Hyponatremia, mild, stable  137-131-134. Likely related to holding patient's PTA diuretics, hypervolemic hyponatremia. Improved with resumption of Bumex.  - Continue PTA Bumex  - Monitor PRN     Chronic stable diagnoses and other pertinent medical history: Appropriate PTA medications will be resumed. Nonessential medications will be held if patient is observation status.   Hypertension - continue PTA Coreg with hold parameters  Hyperlipidemia - continue PTA statin  Peripheral edema - restart Bumex; restarted PTA spironolactone and potassium (held during stay due to elevated Cr)       Diet:  Regular diet    Expected Discharge: working on discharge plans with care coordination  Disposition difficulty: decision on TCU for discharge, from family    Crystal Falcon PA-C  Hospitalist Service  [facility]  Securely message with [employer] (more info)  Text page via [facility] Paging/Directory     ______________________________________________________________________    Chief Complaint   Transitioning to correction care    History of Present Illness   Deborah Higginbotham is a 89 year old female who is being transitioned to correction care.  Please see the other encounters from the same date for more details; a brief summary of her current symptoms and situation is included here.    Deborah \"Tammi\" Neftaly is a 89-year-old female with past medical history significant for hypertension, hyperlipidemia and CKD stage III who lives at [facility] facility and presented to [facility] ED on 12/29/2024 with chief complaint of fall likely mechanical and was diagnosed with multiple rib fractures after she complained of left-sided chest pain. She was admitted to [facility] for pulmonary hygiene, respiratory monitoring and pain control. She had a brief NICK with addition of NSAIDs which improved as well as mild " hyponatremia also improved with resuming diuretics. She is presently medically stable awaiting discharge to TCU.  For full HPI please see admission H&P from Dr. Coronado dated 12/29/2024.     Prior to Admission Medications   Cannot display prior to admission medications because the patient has not been admitted in this contact.        Physical Exam   Vital Signs:                    Weight: 0 lbs 0 oz    Physical exam deferred given [facility] status

## 2025-01-03 NOTE — PLAN OF CARE
Goal Outcome Evaluation:      Plan of Care Reviewed With: patient    Overall Patient Progress: no changeOverall Patient Progress: no change      Observation goals  PRIOR TO DISCHARGE        Comments:   -diagnostic tests and consults completed and resulted: Partially met, SW following  -vital signs normal or at patient baseline: Met  -adequate pain control on oral analgesics: Partially met  -safe disposition plan has been identified: Not met  Nurse to notify provider when observation goals have been met and patient is ready for discharge.

## 2025-01-03 NOTE — DISCHARGE SUMMARY
"[facility]  Hospitalist Discharge Summary      Date of Admission:  12/29/2024  Date of Discharge:  1/3/2025  Discharging Provider: Crystal Falcon PA-C  Discharge Service: Hospitalist Service    Discharge Diagnoses   Left 4th and 10th Rib Fractures secondary to mechanical fall  Physical deconditioning  Acute kidney injury on CKD stage 3, improving  Congestion  Hyponatremia, mild, stable    Follow-ups Needed After Discharge   Transferring to detention status    Discharge Disposition   Transferred to detention care while awaiting TCU   Condition at discharge: Stable    Hospital Course   Deborah \"Tammi\" Neftaly is a 89-year-old female with past medical history significant for hypertension, hyperlipidemia and CKD stage III who lives at assisted facility and presented to [facility] ED on 12/29/2024 with chief complaint of fall likely mechanical and was diagnosed with multiple rib fractures after she complained of left-sided chest pain. [facility] respiratory [facility] and pain control. She is presently medically stable awaiting discharge to TCU.  For full HPI please see admission H&P from Dr. Coronado dated 12/29/2024.     Left 4th and 10th Rib Fractures secondary to mechanical fall  Physical deconditioning  Presented with mechanical fall. CXR showed acute mildly displaced fractures of the anterior left fifth, sixth, seventh, eighth, and ninth ribs. Question trace left pleural fluid. No significant pneumothorax. Bones are demineralized. Thoracolumbar kyphoscoliosis. CT chest revealed subtle nondisplaced fracture involving the left fourth and 10th rib and several old bilateral rib fractures and old sternal fracture.     - Patient discharging to detention status 1/3/2025.  Continued plan as below.  - Multimodal pain regimen with with tylenol, robaxin and lidocaine patch and PRN oxycodone. Decreased robaxin to PRN as it  " "makes patient quite sleepy.   - PT/OT consulted and recommending TCU   - Encourage deep breaths, activity as tolerated and incentive spirometry  - Remains stable on room air, discontinue continuous oxygen monitoring  - Medically stable to discharge, changing to shelter status, awaiting family to decide on TCU and subsequent insurance authorization vs now family considering home care     Acute kidney injury on CKD stage 3, improving  Baseline creatinine is around 1.2-1.3.  Cr 1.19-1.10-1.61-1.25, back to baseline s/p IVF. This might be due to decreased oral intake along with use of diuretics and inuprofen use.  PTA regimen includes bumex and spironolactone  - Discontinued ibuprofen  - Held PTA Bumex and spironolactone, restarted 1/3/25  - Avoid nephrotoxins  - Monitor PRN     Congestion  Reports morning congestion for a \"long time\". More difficult to clear mucous with rib fractures. Improving.  -Guaifenesin PRN  -Tessalon pearles TID PRN  -IS/Flutter valve use frequently     Hyponatremia, mild, stable  137-131-134. Likely related to holding patient's PTA diuretics, hypervolemic hyponatremia. Improved with resumption of Bumex.  - Restarted PTA Bumex   - Monitor PRN     Chronic stable diagnoses and other pertinent medical history: Appropriate PTA medications will be resumed. Nonessential medications will be held if patient is observation status.   Hypertension - continue PTA Coreg with hold parameters  Hyperlipidemia - continue PTA statin  Peripheral edema - restart Bumex; restarted PTA spironolactone and potassium (held during stay due to elevated Cr)    Consultations This Hospital Stay   CARE MANAGEMENT / SOCIAL WORK IP CONSULT  VASCULAR ACCESS ADULT IP CONSULT  THORACIC SURGERY IP CONSULT  PHYSICAL THERAPY ADULT IP CONSULT  OCCUPATIONAL THERAPY ADULT IP CONSULT  CARE MANAGEMENT / SOCIAL WORK IP CONSULT  VASCULAR ACCESS ADULT IP CONSULT  VASCULAR ACCESS ADULT IP CONSULT  VASCULAR ACCESS ADULT IP CONSULT  CARE " MANAGEMENT / SOCIAL WORK IP CONSULT  CARE MANAGEMENT / SOCIAL WORK IP CONSULT  PHARMACY IP CONSULT    Code Status   No CPR- Do NOT Intubate    Time Spent on this Encounter   I, Crystal Falcon PA-C, personally saw the patient today and spent greater than 30 minutes discharging this patient.       The patient's care was discussed with the Attending Physician, Dr. Bach, Bedside Nurse, Care Coordinator/, and Patient.    Crystal Falcon PA-C  [facility]END[facility]ORT STAY  [address] [address]  Phone: [phone]  ______________________________________________________________________    Physical Exam   Vital Signs: Temp: 97.6  F (36.4  C) Temp src: Oral BP: 97/56 Pulse: 57   Resp: 18 SpO2: 95 % O2 Device: None (Room air)    Weight: 182 lbs 0 oz    General: Awake, alert, very pleasant woman who appears stated age. Looks comfortable sitting up in chair. No acute distress.  HEENT: Normocephalic, atraumatic. Extraocular movements intact.   Respiratory: Clear to auscultation bilaterally, no rales, wheezing, or rhonchi. Breathing comfortably on RA.   Cardiovascular: Regular rate and rhythm, +S1 and S2, no murmur auscultated. Trace to 1+ peripheral edema, teds in place.  Gastrointestinal: Soft, non-tender, non-distended. Bowel sounds present.  Skin: Warm, dry. No obvious rashes or lesions on exposed skin. Dorsalis pedis pulses palpable bilaterally.  Musculoskeletal: TTP to left side of rib cage  Neurologic: AAO x3.  Psychiatric: Appropriate mood and affect. No obvious anxiety or depression.          Primary Care Physician   Stu Stanford    Discharge Orders   No discharge procedures on file.    Significant Results and Procedures   Results for orders placed or performed during the hospital encounter of 12/29/24   Ribs XR, unilat 3 views + PA chest,  left    Narrative    EXAM: XR RIBS AND CHEST LEFT 3 VIEWS  LOCATION: [facility]OS[facility]  DATE: 12/29/2024    INDICATION: [Chest wall pain after a fall.  COMPARISON: None.      Impression    IMPRESSION: Acute mildly displaced fractures of the anterior left fifth, sixth, seventh, eighth, and ninth ribs. Question trace left pleural fluid. No significant pneumothorax. Bones are demineralized. Thoracolumbar kyphoscoliosis. Degenerative changes   in the spine. Cardiomegaly. Tortuous and atherosclerotic thoracic aorta. Hyperinflated lungs compatible with underlying COPD. Extensive surgical clips in the left chest wall.   Chest CT w/o contrast    Narrative    EXAM: CT CHEST W/O CONTRAST  LOCATION: [facility]  DATE: 12/29/2024    INDICATION: chest wall pain, known rib fractures  COMPARISON: Chest x-ray 12/29/2024  TECHNIQUE: CT chest without IV contrast. Multiplanar reformats were obtained. Dose reduction techniques were used.  CONTRAST: None.    FINDINGS:   LUNGS AND PLEURA: The lungs with subsegmental atelectasis both lower lobes. Scattered strands of fibrosis both lungs. No acute infiltrates or effusions. No pneumothorax.    MEDIASTINUM/AXILLAE: No mediastinal hemorrhage. No lymphadenopathy. Ascending thoracic aorta upper range of normal in size measuring 3.9 x 3.8 cm. Advanced atherosclerotic disease thoracic aorta.    CORONARY ARTERY CALCIFICATION: Severe.    UPPER ABDOMEN: Advanced atherosclerotic disease abdominal aorta.    MUSCULOSKELETAL: Nondisplaced fracture anterior left fourth rib and lateral left 10th rib. Old fracture lateral left ninth rib. Old lateral right fifth rib fracture. Old mid sternal fracture. Osteopenia. Scoliosis. Advanced degenerative disc disease   thoracic and upper lumbar spine. Surgical clips anterior left chest wall.      Impression    IMPRESSION:   1.  Subtle nondisplaced fractures involving the left fourth and left 10th ribs.  2.  Several old bilateral rib fractures and old sternal fracture.  3.  Scattered fibrotic changes and subsegmental  atelectasis both lungs, otherwise no acute pulmonary disease.  4.  Severe atherosclerotic disease.         Discharge Medications     Current Facility-Administered Medications:     acetaminophen (TYLENOL) tablet 650 mg, 650 mg, Oral, Q12H PRN, 650 mg at 12/31/24 0101 **OR** [DISCONTINUED] acetaminophen (TYLENOL) Suppository 650 mg, 650 mg, Rectal, Q4H PRN, Marylin Coronado MD    acetaminophen (TYLENOL) tablet 975 mg, 975 mg, Oral, Q6H, Roldan Sanders MD, 975 mg at 01/03/25 1049    benzonatate (TESSALON) capsule 100 mg, 100 mg, Oral, TID PRN, Crystal Falcon PA-C    bumetanide (BUMEX) tablet 2 mg, 2 mg, Oral, Daily, Annetta Lemus PA-C, 2 mg at 01/03/25 0816    carvedilol (COREG) tablet 25 mg, 25 mg, Oral, BID w/meals, Annetta Lemus PA-C, 25 mg at 01/03/25 0816    guaiFENesin (MUCINEX) 12 hr tablet 600 mg, 600 mg, Oral, BID, Crystal Falcon PA-C, 600 mg at 01/03/25 0816    guaiFENesin (ROBITUSSIN) 20 mg/mL solution 200 mg, 200 mg, Oral, Q4H PRN, Crystal Falcon PA-C, 200 mg at 01/03/25 0424    heparin ANTICOAGULANT injection 5,000 Units, 5,000 Units, Subcutaneous, Q8H, Annetta Lemus PA-C, 5,000 Units at 01/03/25 1049    Lidocaine (LIDOCARE) 4 % Patch 2 patch, 2 patch, Transdermal, Q24H, Hailee Lara MD, 2 patch at 01/03/25 0816    magnesium hydroxide (MILK OF MAGNESIA) suspension 30 mL, 30 mL, Oral, Daily, Annetta Lemus PA-C, 30 mL at 01/03/25 0816    methocarbamol (ROBAXIN) tablet 500 mg, 500 mg, Oral, TID PRN, Crystal Falcon PA-C, 500 mg at 01/03/25 0243    naloxone (NARCAN) injection 0.2 mg, 0.2 mg, Intravenous, Q2 Min PRN **OR** naloxone (NARCAN) injection 0.4 mg, 0.4 mg, Intravenous, Q2 Min PRN **OR** naloxone (NARCAN) injection 0.2 mg, 0.2 mg, Intramuscular, Q2 Min PRN **OR** naloxone (NARCAN) injection 0.4 mg, 0.4 mg, Intramuscular, Q2 Min PRN, Enu-Marylin Ayala MD    ondansetron (ZOFRAN ODT) ODT tab 4 mg, 4 mg, Oral, Q6H PRN **OR** ondansetron  (ZOFRAN) injection 4 mg, 4 mg, Intravenous, Q6H PRN, Marylin Coronado MD    oxyCODONE (ROXICODONE) tablet 5 mg, 5 mg, Oral, Q4H PRN, Marylin Coronado MD, 5 mg at 01/02/25 0231    oxyCODONE IR (ROXICODONE) half-tab 2.5 mg, 2.5 mg, Oral, Q4H PRN, Marylin Coronado MD, 2.5 mg at 01/03/25 1329    potassium chloride payton ER (KLOR-CON M10) CR tablet 10 mEq, 10 mEq, Oral, BID, Crystal Falcon PA-C, 10 mEq at 01/03/25 1215    pravastatin (PRAVACHOL) tablet 40 mg, 40 mg, Oral, At Bedtime, Hailee Lara MD, 40 mg at 01/02/25 2131    prochlorperazine (COMPAZINE) injection 5 mg, 5 mg, Intravenous, Q6H PRN **OR** prochlorperazine (COMPAZINE) tablet 5 mg, 5 mg, Oral, Q6H PRN, Marylin Coronado MD    senna-docusate (SENOKOT-S/PERICOLACE) 8.6-50 MG per tablet 1 tablet, 1 tablet, Oral, BID PRN **OR** senna-docusate (SENOKOT-S/PERICOLACE) 8.6-50 MG per tablet 2 tablet, 2 tablet, Oral, BID PRN, Marylin Coronado MD, 2 tablet at 01/01/25 1805    sennosides (SENOKOT) tablet 2 tablet, 2 tablet, Oral, At Bedtime, Hailee Lara MD, 2 tablet at 01/02/25 2131    sodium chloride (PF) 0.9% PF flush 3 mL, 3 mL, Intracatheter, Q8H, Mily Larkin DO, 3 mL at 01/03/25 0419    sodium chloride (PF) 0.9% PF flush 3 mL, 3 mL, Intracatheter, q1 min prn, Mily Larkin DO    spironolactone (ALDACTONE) tablet 25 mg, 25 mg, Oral, Daily, Crystal Falcon PA-C, 25 mg at 01/03/25 0816    Allergies   No Known Allergies

## 2025-01-03 NOTE — PROGRESS NOTES
"[facility]    Medicine Progress Note - Hospitalist Service    Date of Admission:  12/29/2024    Assessment & Plan   Deborah \"Tammi\" Neftaly is a 89-year-old female with past medical history significant for hypertension, hyperlipidemia and CKD stage III who lives at assisted facility and presented to [facility] ED on 12/29/2024 with chief complaint of fall likely mechanical and was diagnosed with multiple rib fractures after she complained of left-sided chest pain. She was admitted to [facility] for pulmonary hygiene, respiratory monitoring and pain control. She is presently medically stable awaiting discharge to TCU.      Left 4th and 10th Rib Fractures secondary to mechanical fall  Presented with mechanical fall. CXR showed acute mildly displaced fractures of the anterior left fifth, sixth, seventh, eighth, and ninth ribs. Question trace left pleural fluid. No significant pneumothorax. Bones are demineralized. Thoracolumbar kyphoscoliosis. CT chest revealed subtle nondisplaced fracture involving the left fourth and 10th rib and several old bilateral rib fractures and old sternal fracture.   - Observation status  - Multimodal pain regimen with with tylenol, robaxin and lidocaine patch and PRN oxycodone. Decreased robaxin to PRN as it  makes patient quite sleepy.   - PT/OT consulted and recommending TCU   - Encourage deep breaths, activity as tolerated and incentive spirometry  - Remains stable on room air, discontinue continuous oxygen monitoring  - Medically stable to discharge, accepted to [facility] TCU and awaiting insurance auth, however family now reconsidering options and declining 2 facilities in which patient has been accepted to. See SW note for details, who is graciously assisting in placement.    Acute kidney injury on CKD stage 3, improving  Baseline creatinine is around 1.2-1.3.  Cr 1.19-1.10-1.61-1.25, back to baseline s/p IVF. This might be due to " "decreased oral intake along with use of diuretics and inuprofen use.  PTA regimen includes bumex and spironolactone  - Discontinued ibuprofen  - Held PTA Bumex and spironolactone, restarted 1/3/25  - Avoid nephrotoxins  - Monitor PRN    Congestion  Reports morning congestion for a \"long time\". More difficult to clear mucous with rib fractures. Improving.  -Guaifenesin PRN  -tessalon pearles TID PRN  -IS/Flutter valve use frequently     Hyponatremia, mild, stable  Likely related to holding patient's PTA diuretics, hypervolemic hyponatremia. Improved with resumption of Bumex.  - Restarted PTA Bumex   - Monitor PRN     Latest Reference Range & Units 12/31/24 11:03 01/01/25 08:40 01/02/25 11:40   Sodium 135 - 145 mmol/L 137 131 (L) 134 (L)   (L): Data is abnormally low       Chronic stable diagnoses and other pertinent medical history: Appropriate PTA medications will be resumed. Nonessential medications will be held if patient is observation status.   Hypertension - continue PTA Coreg with hold parameters  Hyperlipidemia - continue PTA statin  Peripheral edema - restart Bumex; restarted PTA spironolactone and potassium (held during stay due to elevated Cr)       Observation Goals: -diagnostic tests and consults completed and resulted, -vital signs normal or at patient baseline, -adequate pain control on oral analgesics, -safe disposition plan has been identified, Nurse to notify provider when observation goals have been met and patient is ready for discharge.  Diet: Regular Diet Adult  Room Service    DVT Prophylaxis: Heparin SQ  Ferraro Catheter: Not present  Lines: None     Cardiac Monitoring: None  Code Status: No CPR- Do NOT Intubate      Clinically Significant Risk Factors Present on Admission         # Hyponatremia: Lowest Na = 134 mmol/L in last 2 days, will monitor as appropriate           # Hypertension: Noted on problem list           # Obesity: Estimated body mass index is 33.29 kg/m  as calculated from the " "following:    Height as of this encounter: 1.575 m (5' 2\").    Weight as of this encounter: 82.6 kg (182 lb).              Social Drivers of Health    Housing Stability: High Risk (12/29/2024)    Housing Stability     Do you have housing? : No     Are you worried about losing your housing?: No   Tobacco Use: Medium Risk (11/1/2024)    Received from [facility]    Patient History     Smoking Tobacco Use: Former     Smokeless Tobacco Use: Never    Received from [facility]    Social Connections          Disposition Plan     Medically Ready for Discharge: Ready Now awaiting on family to decide on TCU facility and then insurance authorization can be resubmitted.           The patient's care was discussed with the Attending Physician, Dr. Bach, Bedside Nurse, Care Coordinator/, and Patient.    Crystal Falcon PA-C  Hospitalist Service  [facility]  Securely message with [employer] (more info)  Text page via [employer] Paging/Directory   ______________________________________________________________________    Interval History   Seen and examined. Pain treated with APAP, lido. Feels improving. Medically stable for discharge, SW diligently working on finding patient placement that is acceptable for family. Questions welcomed and answered to the best of my knowledge.    Physical Exam   Vital Signs: Temp: 97.4  F (36.3  C) Temp src: Oral BP: (!) 110/90 Pulse: 64   Resp: 18 SpO2: 95 % O2 Device: None (Room air)    Weight: 182 lbs 0 oz    General: Awake, alert, very pleasant woman who appears stated age. Looks comfortable sitting up in chair. No acute distress.  HEENT: Normocephalic, atraumatic. Extraocular movements intact.   Respiratory: Clear to auscultation bilaterally, no rales, wheezing, or rhonchi. Breathing comfortably on RA.   Cardiovascular: Regular rate and rhythm, +S1 and S2, no murmur auscultated. Trace to 1+ " peripheral edema, teds in place.  Gastrointestinal: Soft, non-tender, non-distended. Bowel sounds present.  Skin: Warm, dry. No obvious rashes or lesions on exposed skin. Dorsalis pedis pulses palpable bilaterally.  Musculoskeletal: TTP to left side of rib cage  Neurologic: AAO x3.  Psychiatric: Appropriate mood and affect. No obvious anxiety or depression.    Medical Decision Making       >35 MINUTES SPENT BY ME on the date of service doing chart review, history, exam, documentation & further activities per the note.      Data     I have personally reviewed the following data over the past 24 hrs:    N/A  \   N/A   / 185     134 (L) 102 26.0 (H) /  117 (H)   3.8 21 (L) 0.99 (H) \       Imaging results reviewed over the past 24 hrs:   No results found for this or any previous visit (from the past 24 hours).

## 2025-01-03 NOTE — PLAN OF CARE
Goal Outcome Evaluation:      Plan of Care Reviewed With: patient    PRIMARY Concern: fall, L 4th and 10th rib fx  SAFETY RISK Concerns (fall risk, behaviors, etc.): fall risk  Aggression Tool Color: Green  Isolation/Type: n/a  Tests/Procedures for NEXT shift: n/a  Consults? (Pending/following, signed-off?)  Thoracic surgery signed off; PT recommending TCU. SW following for TCU placement.  Where is patient from? (Home, TCU, etc.): [facility]  Other Important info for NEXT shift: Intermittent cough. Started on mucinex this shift. Encouraged IS use.  Anticipated DC date & active delays: TBD. TCU placement, ins auth pending.      SUMMARY NOTE:  Orientation/Cognitive: AOX4, forgetful, [place].   Observation Goals (Met/ Not Met): not met  Mobility Level/Assist Equipment: Ax1 gb/W  Antibiotics & Plan (IV/po, length of tx left): n/a  Pain Management: Scheduled tylenol, lido patches off. Prn robaxin and oxy available. Robaxin switched from schedule to prn d/t sleepiness.   Tele/VS/O2: VSS on RA  ABNL Lab/BG: Na 134.   Diet: reg  Bowel/Bladder: occasionally incont urine, voiding well. Ambulates to bathroom. No BM this shift.   Skin Concerns: R thigh big bruised. Scattered bruises/edema LLE, redness on bottom. Compression stocking off at night.  Drains/Devices: PIV SL.   Patient Stated Goal for Today: pain control.

## 2025-01-03 NOTE — PROGRESS NOTES
Care Management Follow Up    Length of Stay (days): 0    Expected Discharge Date: 01/04/2025     Concerns to be Addressed:       Patient plan of care discussed at interdisciplinary rounds: Yes    Anticipated Discharge Disposition: Transitional Care     Anticipated Discharge Services: Transportation Services  Anticipated Discharge DME:      Patient/family educated on Medicare website which has current facility and service quality ratings: yes  Education Provided on the Discharge Plan:    Patient/Family in Agreement with the Plan: yes    Referrals Placed by CM/SW:    Private pay costs discussed: Not applicable    Discussed  Partnership in Safe Discharge Planning  document with patient/family: No     Handoff Completed: No, handoff not indicated or clinically appropriate    Additional Information:  Received e-mail from daughter Briana yesterday evening, stating [facility], she wants to retract the insurnace auth and decline the placement. Briana states she had a friend pull their loved one out of the facility due to neglect. Briana asked for SW to follow up with her this morning. E-mailed back to Briana that [facility] also accepted, so to confirm she would want to decline [facility] and move forward with [facility]. Informed Briana this would cause a delay in discharge to TCU, with insurance auth and the weekend.   Briana called SW and explained that she has looked into both [facility] and [facility] and she will not be sending her mom to either. Briana asked if SW if they knew about these places. SW stated we provide the list of options and request choices from family, so that family have the opportunity to look into them and provide their choices, and these were two that Briana provided. Briana states she did this because of the location mainly. Briana states pt lives in an [facility] and Briana called pt's insurance company, who told her that pt can go out of network to TCU with their plan.  CARLOTTA stated that any TCU that does not accept [employer] will decline off the bat, as their providers will not be in network at the TCU then. Briana states this is incorrect and asked CARLOTTA to send there referral anyway. Informed Briana we would send a referral to [facility] referrals to double check on this. Asked Briana to research facilities on the [employer] in network list and provide many more options for TCU.   Updated hospitalist.     Genevieve Guzman, JESSIE  Social Work  [facility]

## 2025-01-03 NOTE — CONSULTS
Care Management Medical half-way Outpatient Consult    Care management consulted by provider for jail assessment.  CM spoke with provider to discuss jail case. Provider has confirmed patient is medically stable for discharge.    Background information: Patient was admitted on 12/29/24 for multiple rib fractures after a fall.     Care team recommended discharge disposition:  TCU    Resources needed for discharge: Accepting facility, insurance authorization     Discharge plan secured to meet patient's needs?  Yes    Estimated timeline for discharge:   greater than 24 hours    Barriers to discharge: Family declining two accepting facilities after providing these choices to care management. Family requesting facilities that are out of network with patient's insurance. Family to provide more TCU choices to social work, which has not happened yet, as of this note being written. Patient will also need accepting facility and then insurance authorization, which would not be anticipated to be in place throughout the weekend.    Discussed above with provider & charge RN.      Next steps:       Anticipate patient will discharge greater than the next 24 hours to TCU.      Patient has an established discharge plan that can meet their needs as identified above. Patient/family refusing discharge, as they are no longer in agreement with the TCU choices they initially provided.      Anticipate patient will transition to jail care due to noted barriers to discharge.  CM met/spoke with patient/family at bedside.  Discussed patient no longer meets medical criteria for ongoing hospital level of care & medical insurance may not cover the ongoing hospital stay.  CM team will continue work towards discharge plan to meet patient's needs.      JESSIE Veras  Social Work  [facility]

## 2025-01-04 PROCEDURE — 101N000002 HC CUSTODIAL CARE DAILY

## 2025-01-04 PROCEDURE — 96372 THER/PROPH/DIAG INJ SC/IM: CPT | Performed by: PHYSICIAN ASSISTANT

## 2025-01-04 PROCEDURE — 250N000013 HC RX MED GY IP 250 OP 250 PS 637: Performed by: PHYSICIAN ASSISTANT

## 2025-01-04 PROCEDURE — 250N000011 HC RX IP 250 OP 636: Performed by: PHYSICIAN ASSISTANT

## 2025-01-04 PROCEDURE — 99207 PR NO BILLABLE SERVICE THIS VISIT: CPT | Performed by: INTERNAL MEDICINE

## 2025-01-04 RX ORDER — GUAIFENESIN 600 MG/1
600 TABLET, EXTENDED RELEASE ORAL 2 TIMES DAILY
DISCHARGE
Start: 2025-01-04 | End: 2025-01-06

## 2025-01-04 RX ORDER — ACETAMINOPHEN 500 MG
500 TABLET ORAL 2 TIMES DAILY PRN
DISCHARGE
Start: 2025-01-04 | End: 2025-01-06

## 2025-01-04 RX ORDER — ACETAMINOPHEN 325 MG/1
975 TABLET ORAL 3 TIMES DAILY
DISCHARGE
Start: 2025-01-04 | End: 2025-01-06

## 2025-01-04 RX ORDER — LIDOCAINE 4 G/G
2 PATCH TOPICAL EVERY 24 HOURS
DISCHARGE
Start: 2025-01-04 | End: 2025-01-06

## 2025-01-04 RX ORDER — GUAIFENESIN 200 MG/10ML
200 LIQUID ORAL EVERY 4 HOURS PRN
DISCHARGE
Start: 2025-01-04 | End: 2025-01-06

## 2025-01-04 RX ORDER — BENZONATATE 100 MG/1
100 CAPSULE ORAL 3 TIMES DAILY PRN
DISCHARGE
Start: 2025-01-04 | End: 2025-01-06

## 2025-01-04 RX ORDER — OXYCODONE HYDROCHLORIDE 5 MG/1
2.5 TABLET ORAL EVERY 6 HOURS PRN
Qty: 10 TABLET | Refills: 0 | Status: SHIPPED | OUTPATIENT
Start: 2025-01-04 | End: 2025-01-06

## 2025-01-04 RX ADMIN — CARVEDILOL 25 MG: 25 TABLET, FILM COATED ORAL at 17:22

## 2025-01-04 RX ADMIN — ACETAMINOPHEN 975 MG: 325 TABLET, FILM COATED ORAL at 11:04

## 2025-01-04 RX ADMIN — CARVEDILOL 25 MG: 25 TABLET, FILM COATED ORAL at 07:58

## 2025-01-04 RX ADMIN — BUMETANIDE 2 MG: 1 TABLET ORAL at 07:58

## 2025-01-04 RX ADMIN — PRAVASTATIN SODIUM 40 MG: 40 TABLET ORAL at 21:48

## 2025-01-04 RX ADMIN — MAGNESIUM HYDROXIDE 30 ML: 400 SUSPENSION ORAL at 07:57

## 2025-01-04 RX ADMIN — GUAIFENESIN 600 MG: 600 TABLET, EXTENDED RELEASE ORAL at 07:58

## 2025-01-04 RX ADMIN — POTASSIUM CHLORIDE 10 MEQ: 750 TABLET, EXTENDED RELEASE ORAL at 11:04

## 2025-01-04 RX ADMIN — ACETAMINOPHEN 975 MG: 325 TABLET, FILM COATED ORAL at 03:54

## 2025-01-04 RX ADMIN — HEPARIN SODIUM 5000 UNITS: 5000 INJECTION, SOLUTION INTRAVENOUS; SUBCUTANEOUS at 03:49

## 2025-01-04 RX ADMIN — POTASSIUM CHLORIDE 10 MEQ: 750 TABLET, EXTENDED RELEASE ORAL at 07:58

## 2025-01-04 RX ADMIN — HEPARIN SODIUM 5000 UNITS: 5000 INJECTION, SOLUTION INTRAVENOUS; SUBCUTANEOUS at 19:09

## 2025-01-04 RX ADMIN — SPIRONOLACTONE 25 MG: 25 TABLET ORAL at 07:58

## 2025-01-04 RX ADMIN — ACETAMINOPHEN 975 MG: 325 TABLET, FILM COATED ORAL at 17:21

## 2025-01-04 RX ADMIN — GUAIFENESIN 600 MG: 600 TABLET, EXTENDED RELEASE ORAL at 19:09

## 2025-01-04 RX ADMIN — SENNOSIDES 2 TABLET: 8.6 TABLET, FILM COATED ORAL at 21:48

## 2025-01-04 RX ADMIN — LIDOCAINE 2 PATCH: 4 PATCH TOPICAL at 07:57

## 2025-01-04 RX ADMIN — HEPARIN SODIUM 5000 UNITS: 5000 INJECTION, SOLUTION INTRAVENOUS; SUBCUTANEOUS at 11:04

## 2025-01-04 ASSESSMENT — ACTIVITIES OF DAILY LIVING (ADL)
ADLS_ACUITY_SCORE: 62
ADLS_ACUITY_SCORE: 60
ADLS_ACUITY_SCORE: 60
ADLS_ACUITY_SCORE: 62
ADLS_ACUITY_SCORE: 60
ADLS_ACUITY_SCORE: 60
ADLS_ACUITY_SCORE: 62
ADLS_ACUITY_SCORE: 62
ADLS_ACUITY_SCORE: 60
ADLS_ACUITY_SCORE: 62

## 2025-01-04 NOTE — PROGRESS NOTES
"[facility]    Internal Medicine Hospitalist Progress Note  01/04/2025  I evaluated patient on the above date.    Linus Lizama Jr., MD  [phone] (p)  Text Page  Vocera      [New actions/orders today (01/04/2025) are underlined in the assessment and plan. All lab results in the assessment and plan were reviewed.]  Assessment & Plan   Deborah \"Tammi\" Neftaly is a 89-year-old female with past medical history significant for hypertension; hyperlipidemia; and CKD stage III; who lives at assisted facility; who presented to [facility] on 12/29/2024 with chief complaint of fall likely mechanical and was diagnosed with multiple rib fractures after she complained of left-sided chest pain. She was admitted to [facility] for pulmonary hygiene, respiratory monitoring and pain control. She was medically stabilized with subsequent plan for TCU. Transitioned to care home care status 1/3/2024.    On initial evaluation 12/29, was afebrile, hypertensive, tachypneic, sats in 90's. Labs notable for CBC normal; BMP with BUN 26.3, cr 1.19.    CXR showed acute mildly displaced fractures of the anterior left fifth, sixth, seventh, eighth, and ninth ribs; question trace left pleural fluid; no significant pneumothorax.     CT chest w/o contrast showed subtle nondisplaced fractures involving the left fourth and left 10th ribs; several old bilateral rib fractures and old sternal fracture; scattered fibrotic changes and subsegmental atelectasis both lungs, otherwise no acute pulmonary disease.       Left 4th and 10th rib fractures (non-displaced) secondary to mechanical fall.  Chest congestion.  * Initial presentation as above. Admitted under observation and started on analgesics, respiratory cares.  - Continue acetaminophen 975 mg q6h; lidocaine patch 4%, 2 patches daily; PRN acetaminophen, PRN methocarbamol, PRN oxycodone, minimize opioids as able.  - Continue guaifenesin 600 mg BID; PRN " "guaifenesin, PRN benzonatate.   - Continue PT and OT - plan TCU once bed availability (noted family declined 2 previous TCU's she was accepted to).  - Continue to encourage deep breaths, activity as tolerated and incentive spirometry.    Acute kidney injury on CKD stage 3, suspect prerenal.  * Baseline creatinine is around 1.2-1.3.  * Cr 1.19 on admit.  * Cr up to 1.61 on 12/31/2024.  * Given IVF's. PTA bumetanide and spironolactone held, restarted 1/3.  Recent Labs   Lab 01/02/25  1140 01/01/25  0840 12/31/24  1103 12/30/24  0931 12/29/24  1544   CR 0.99* 1.25* 1.61* 1.10* 1.19*   Estimated Creatinine Clearance: 35.4 mL/min (A) (based on SCr of 0.99 mg/dL (H)).  - Continue to monitor BMP outpatient.  - Avoid nephrotoxic medications.    Hyponatremia, suspect nutritional/hypovolemia.  * Sodium normal on admit.   * Sodium 131 on 1/1.  * Given IVF's this hospitalization.  * Sodium 134 on 1/2.     Hypertension (benign essential).  Peripheral edema.  [PTA: bumetanide 2 mg daily; carvedilol 25 mg BID; spironolactone 25 mg daily.]  * PTA bumetanide and spironolactone held for NICK, restarted 1/3.  - Continue bumetanide, carvedilol, and spironolactone.    Hyperlipidemia/dyslipidemia.  - Continue pravastatin.      Clinically Significant Risk Factors Present on Admission         # Hyponatremia: Lowest Na = 134 mmol/L in last 2 days, will monitor as appropriate           # Hypertension: Noted on problem list           # Obesity: Estimated body mass index is 33.29 kg/m  as calculated from the following:    Height as of 12/29/24: 1.575 m (5' 2\").    Weight as of 12/29/24: 82.6 kg (182 lb).              Diet: Regular Diet Adult  Room Service    Prophylaxis: PCD's and ambulation  Ferraro Catheter: Not present  Lines: None     Code Status: No CPR- Do NOT Intubate    Disposition Plan   Medically Ready for Discharge: Ready Now  Expected discharge to home with family pending TCU/rehab/facility bed availability.    Entered: Linus Read " MD [place] 01/04/2025, 9:26 AM             Interval History   Doing OK overall.  Pain controlled at rest.  Pt wants to go to TCU.    * Data reviewed today: I reviewed all new labs and imaging over the last 24 hours. I personally reviewed no images or ECG's today.    Physical Exam   Most recent vitals:   , Blood pressure 115/56, pulse 58, temperature 97.9  F (36.6  C), temperature source Oral, resp. rate 18, SpO2 94%. O2 Device: None (Room air)    There were no vitals filed for this visit.  Vital signs with ranges:  Temp:  [97.1  F (36.2  C)-97.9  F (36.6  C)] 97.9  F (36.6  C)  Pulse:  [57-62] 58  Resp:  [16-18] 18  BP: ()/(56-68) 115/56  SpO2:  [94 %-98 %] 94 %  Patient Vitals for the past 24 hrs:   BP Temp Temp src Pulse Resp SpO2   01/04/25 0758 115/56 97.9  F (36.6  C) Oral 58 18 94 %   01/03/25 2211 134/68 97.1  F (36.2  C) Oral 58 16 98 %   01/03/25 1757 116/63 97.6  F (36.4  C) Oral 62 16 96 %     I/O's last 24 hours:  No intake/output data recorded.    Constitutional: awake, alert, oriented   Head:   Eyes:   ENT:   Neck:   Cardiovascular: regular rate and rhythm; no murmurs, rubs or gallops  Lungs: diminished in the bases, no crackles or wheezes  Gastrointestinal/Abdomen: soft, non-tender, non-distended, positive bowel sounds  :   Musculoskeletal:   Skin/Extremities:   Neurologic:   Psychiatric:   Hematologic/Lymphatic/Immunologic:        Labs reviewed.  Recent Labs   Lab 01/03/25  0824 01/02/25  1140 01/01/25  0840 12/31/24  1103 12/30/24  0931 12/29/24  1544   WBC  --   --   --   --  7.9 7.0   HGB  --   --   --   --  13.7 13.6   MCV  --   --   --   --  94 93     --   --   --  218 212   NA  --  134* 131* 137 137 137   POTASSIUM  --  3.8 4.0 4.4 4.1 4.4   CHLORIDE  --  102 98 101 103 101   CO2  --  21* 20* 20* 21* 24   BUN  --  26.0* 35.2* 40.2* 27.6* 26.3*   CR  --  0.99* 1.25* 1.61* 1.10* 1.19*   ANIONGAP  --  11 13 16* 13 12   ARABELLA  --  9.0 9.1 9.8 9.7 9.9   GLC  --  117* 111* 138* 139* 123*  "    Recent Labs   Lab Test 01/07/23  1241 10/04/18  1550   NT-PROBNP, INPATIENT 1,013 349     Recent Labs   Lab 01/02/25  1140 01/01/25  0840 12/31/24  1103 12/30/24  0931 12/29/24  1544   * 111* 138* 139* 123*     No lab results found.    No results for input(s): \"INR\", \"[id number]\" in the last 168 hours.  Recent Labs   Lab 12/30/24  0931 12/29/24  1544   WBC 7.9 7.0       MICRO:  Cultures (including blood and urine):  No lab results found in last 7 days.    No results found for this or any previous visit (from the past 24 hours).    Medications   All medications were reviewed. MAR.    Infusions:  Current Facility-Administered Medications   Medication Dose Route Frequency Provider Last Rate Last Admin     Scheduled Medications:  Current Facility-Administered Medications   Medication Dose Route Frequency Provider Last Rate Last Admin    acetaminophen (TYLENOL) tablet 975 mg  975 mg Oral Q6H Crystal Falcon PA-C   975 mg at 01/04/25 0354    bumetanide (BUMEX) tablet 2 mg  2 mg Oral Daily Crystal Falcon PA-C   2 mg at 01/04/25 0758    carvedilol (COREG) tablet 25 mg  25 mg Oral BID w/meals Crystal Falcon PA-C   25 mg at 01/04/25 0758    guaiFENesin (MUCINEX) 12 hr tablet 600 mg  600 mg Oral BID Crystal Falcon PA-C   600 mg at 01/04/25 0758    heparin ANTICOAGULANT injection 5,000 Units  5,000 Units Subcutaneous Q8H Crystal Falcon PA-C   5,000 Units at 01/04/25 0349    Lidocaine (LIDOCARE) 4 % Patch 2 patch  2 patch Transdermal Q24H Crystal Falcon PA-C   2 patch at 01/04/25 0757    magnesium hydroxide (MILK OF MAGNESIA) suspension 30 mL  30 mL Oral Daily Crystal Falcon PA-C   30 mL at 01/04/25 0757    potassium chloride payton ER (KLOR-CON M10) CR tablet 10 mEq  10 mEq Oral BID Crystal Falcon PA-C   10 mEq at 01/04/25 0758    pravastatin (PRAVACHOL) tablet 40 mg  40 mg Oral At Bedtime Crystal Falcon PA-C   40 mg at " 01/03/25 2014    sennosides (SENOKOT) tablet 2 tablet  2 tablet Oral At Bedtime Crystal Falcon PA-C   2 tablet at 01/03/25 2014    sodium chloride (PF) 0.9% PF flush 3 mL  3 mL Intracatheter Q8H Crystal Falcon PA-C   3 mL at 01/04/25 0806    spironolactone (ALDACTONE) tablet 25 mg  25 mg Oral Daily Crystal Falcon PA-C   25 mg at 01/04/25 0758     PRN Medications:  Current Facility-Administered Medications   Medication Dose Route Frequency Provider Last Rate Last Admin    acetaminophen (TYLENOL) tablet 650 mg  650 mg Oral Q12H PRN Crystal Falcon PA-C        benzonatate (TESSALON) capsule 100 mg  100 mg Oral TID PRN Crystal Falcon PA-C        guaiFENesin (ROBITUSSIN) 20 mg/mL solution 200 mg  200 mg Oral Q4H PRN Crystal Falcon PA-C        methocarbamol (ROBAXIN) tablet 500 mg  500 mg Oral TID PRN Crystal Falcon PA-C        naloxone (NARCAN) injection 0.2 mg  0.2 mg Intravenous Q2 Min PRN Crystal Falcon PA-C        Or    naloxone (NARCAN) injection 0.4 mg  0.4 mg Intravenous Q2 Min PRN Crystal Falcon PA-C        Or    naloxone (NARCAN) injection 0.2 mg  0.2 mg Intramuscular Q2 Min PRN Crystal Falcon PA-C        Or    naloxone (NARCAN) injection 0.4 mg  0.4 mg Intramuscular Q2 Min PRN Crystal Falcon PA-C        ondansetron (ZOFRAN ODT) ODT tab 4 mg  4 mg Oral Q6H PRN Crystal Falcon PA-C        Or    ondansetron (ZOFRAN) injection 4 mg  4 mg Intravenous Q6H PRN Crystal Falcon PA-C        oxyCODONE (ROXICODONE) tablet 5 mg  5 mg Oral Q4H PRN Crystal Falcon PA-C        oxyCODONE IR (ROXICODONE) half-tab 2.5 mg  2.5 mg Oral Q4H PRN Crystal Falcon PA-C        prochlorperazine (COMPAZINE) injection 5 mg  5 mg Intravenous Q6H PRN Crystal Falcon PA-C        Or    prochlorperazine (COMPAZINE) tablet 5 mg  5 mg Oral Q6H PRN Crystal Falcon PA-C         senna-docusate (SENOKOT-S/PERICOLACE) 8.6-50 MG per tablet 1 tablet  1 tablet Oral BID PRN Crystal Falcon PA-C        Or    senna-docusate (SENOKOT-S/PERICOLACE) 8.6-50 MG per tablet 2 tablet  2 tablet Oral BID PRN Crystal Falcon PA-C        sodium chloride (PF) 0.9% PF flush 3 mL  3 mL Intracatheter q1 min prn Crystal Falcon PA-C          Patient with one or more new problems requiring additional work-up/treatment.

## 2025-01-04 NOTE — PROGRESS NOTES
"Care Management Follow Up    Length of Stay (days): 0    Expected Discharge Date: 01/04/2025     Concerns to be Addressed:       Patient plan of care discussed at interdisciplinary rounds: Yes    Anticipated Discharge Disposition:                Anticipated Discharge Services:    Anticipated Discharge DME:      Patient/family educated on Medicare website which has current facility and service quality ratings:    Education Provided on the Discharge Plan:    Patient/Family in Agreement with the Plan:      Referrals Placed by CM/SW:    Private pay costs discussed:  Private pay home care    Discussed  Partnership in Safe Discharge Planning  document with patient/family: No     Handoff Completed: No, handoff not indicated or clinically appropriate    Additional Information:  Writer spoke with patient's daughter Briana regarding discharge plan.   Briana requested for writer to send referrals to [facility] as she stated she talked to [facility] in admissions who stated \"they would have bed availability on Monday.\" Briana also requested for writer to send referral to [facility] as she received a list \"directly from [facility] stating they are in-network\" - does not show in-network on hospital list but writer saw email from Briana and sent in the event they are.  Writer updated Briana on the possibility that come Monday if there is an accepting facility, the TCU needs to run authorization and they may state patient is doing \"too well\" to qualify as patient is walking 80' - Briana stated that in that event then they are back in the same situation they are now.   Writer also reviewed the previous 's notes and plan for patient to discharge home with home care and increased services.   Briana reports frustration with assisted status.   Writer advised on the options and Briana stated she will speak with her brother and report back with a plan.     Next Steps: Awaiting decision from daughter. Assist in discharge " planning.    Addendum 1600: Writer spoke with daughter Briana who stated at this time they would like for patient to remain in the hospital to see if either [facility] or [facility] has bed availability/insurance auth is approved as she and her brother do not feel taking her home is a safe discharge plan.   Briana requested to speak with someone in leadership regarding patient's intermediate status.  Writer reaching out to supervisor to inquire who to refer Briana.    NAVI Mahan, SW    [facility]

## 2025-01-04 NOTE — PLAN OF CARE
Goal Outcome Evaluation:      Plan of Care Reviewed With: patient               1/3/25; [id number]  PRIMARY Concern: fall, L 4th and 10th rib fx  SAFETY RISK Concerns (fall risk, behaviors, etc.): fall risk  Aggression Tool Color: Green  Isolation/Type: n/a  Tests/Procedures for NEXT shift: n/a  Consults? (Pending/following, signed-off?)  Thoracic surgery signed off; PT rec TCU. SW following for placement.  Where is patient from? (Home, TCU, etc.): [facility]  Other Important info for NEXT shift: Intermittent cough. on mucinex. Encouraged IS use.  Anticipated DC date & active delays: TBD. Pt flip to [facility] care. TCU placement vs Home with HHC. Family refusing in network TCU's. Considering d/c'ing home with hired help and HHC.       SUMMARY NOTE:   Orientation/Cognitive: AOX4, forgetful, [place].   Observation Goals (Met/ Not Met): detention care  Mobility Level/Assist Equipment: Ax1 gb/W  Antibiotics & Plan (IV/po, length of tx left): n/a  Pain Management: Scheduled tylenol, lido patches.   Tele/VS/O2: VSS on RA  ABNL Lab/BG: Na 134.   Diet: Reg  Bowel/Bladder: occasionally incont urine, voiding well. Ambulates to bathroom. No BM this shift. Continue with stool softeners.   Skin Concerns: scattered bruises/edema LLE, redness to coccyx. Compression stocking off at night.  Drains/Devices: PIV SL.   Patient Stated Goal for Today: pain control.

## 2025-01-04 NOTE — PLAN OF CARE
[id number]  PRIMARY Concern: fall, L 4th - 10th rib fx  SAFETY RISK Concerns (fall risk, behaviors, etc.): fall risk  Aggression Tool Color: Green  Isolation/Type: n/a  Tests/Procedures for NEXT shift: n/a  Consults? (Pending/following, signed-off?) Thoracic surgery signed off; PT rec TCU. SW following for placement.  Where is patient from? (Home, TCU, etc.): [facility]  Other Important info for NEXT shift: Intermittent cough. Started on mucinex. Encouraged IS use.  Anticipated DC date & active delays: TBD. TCU placement vs Home with HHC. Family refusing in network TCU's. Considering d/c'ing home with hired help and HHC.       SUMMARY NOTE:  Orientation/Cognitive: AOX4, forgetful, [place].   Observation Goals (Met/ Not Met): [facility] care  Mobility Level/Assist Equipment: Ax1 gb/W  Antibiotics & Plan (IV/po, length of tx left): n/a  Pain Management: Scheduled tylenol, lido patches (under L breast and low back).   Tele/VS/O2: VSS on RA  ABNL Lab/BG: Na 134.   Diet: Reg  Bowel/Bladder: occasionally incont urine, voiding well. Ambulates to bathroom. No BM this shift. LBM 1 wk ago per pt.  Skin Concerns: scattered bruises/edema LLE, redness to coccyx. Compression stocking.  Drains/Devices: PIV SL.   Patient Stated Goal for Today: sleep

## 2025-01-04 NOTE — DISCHARGE SUMMARY
[facility]  Discharge Summary        Deborah Higginbotham MRN# [id number]   YOB: 1931 Age: 89 year old     Date of Admission: 1/3/2025  Date of Discharge: 01/09/2025  Admitting Physician: Marylin Coronado MD  Discharge Physician: Linus Lizama MD     Primary Provider: Stu Stanford  Primary Care Physician Phone Number: [phone]         Discharge Diagnoses:   Left 4th and 10th rib fractures (non-displaced) secondary to mechanical fall.  Chest congestion.  Acute kidney injury on CKD stage 3, suspect prerenal.  Hyponatremia, suspect nutritional/hypovolemia.        Other Chronic Medical Problems:      Hypertension (benign essential).  Peripheral edema.  Hyperlipidemia/dyslipidemia.       Allergies:       No Known Allergies        Discharge Medications:        Discharge Medication List as of 1/9/2025  6:10 PM        CONTINUE these medications which have CHANGED    Details   !! acetaminophen (TYLENOL) 500 MG tablet Take 2 tablets (1,000 mg) by mouth 3 times daily. Maximum 4 grams/day of acetaminophen from all sources., Disp-60 tablet, R-1, E-Prescribe      !! acetaminophen (TYLENOL) 500 MG tablet Take 1 tablet (500 mg) by mouth 2 times daily as needed for mild pain or other (and adjunct with moderate or severe pain or per patient request). Maximum 4 grams/day of acetaminophen from all sources., Disp-30 tablet, R-1, E-Prescribe      guaiFENesin (MUCINEX) 600 MG 12 hr tablet Take 1 tablet (600 mg) by mouth 2 times daily., Disp-30 tablet, R-1, E-Prescribe      guaiFENesin (ROBITUSSIN) 20 mg/mL liquid Take 10 mLs (200 mg) by mouth every 4 hours as needed for cough., Disp-236 mL, R-1, E-Prescribe      Lidocaine (LIDOCARE) 4 % Patch Place 2 patches over 12 hours onto the skin every 24 hours. To prevent lidocaine toxicity, patient should be patch free for 12 hrs daily.Disp-14 patch, [id number]      oxyCODONE (ROXICODONE) 5 MG tablet Take 0.5 tablets (2.5 mg) by mouth  every 6 hours as needed for moderate to severe pain., Disp-10 tablet, R-0, E-Prescribe       !! - Potential duplicate medications found. Please discuss with provider.        CONTINUE these medications which have NOT CHANGED    Details   bumetanide (BUMEX) 2 MG tablet Take 2 mg by mouth daily., Historical      carvedilol (COREG) 25 MG tablet Take 1 tablet (25 mg) by mouth 2 times daily (with meals), Transitional      potassium chloride (MICRO-K) 10 MEQ CR capsule Take 10 mEq by mouth 2 times daily Morning & Noon., Historical      PRAVASTATIN SODIUM PO Take 40 mg by mouth At Bedtime , Historical      senna (SENOKOT) 8.6 MG tablet Take 2 tablets by mouth at bedtime., Historical      spironolactone (ALDACTONE) 25 MG tablet Take 25 mg by mouth daily., Historical      VITAMIN D, CHOLECALCIFEROL, PO Take 2,000 Units by mouth daily , Historical           STOP taking these medications       benzonatate (TESSALON) 100 MG capsule Comments:   Reason for Stopping:                   Discharge Instructions and Follow-Up:      Discharge Orders      Home Care Referral      Reason for your hospital stay    1. Left 4th and 10th rib fractures (non-displaced) secondary to mechanical fall.  2. Chest congestion.  3. Acute kidney injury on CKD stage 3, suspect prerenal.  4. Hyponatremia, suspect nutritional/hypovolemia.     Activity    Your activity upon discharge: activity as tolerated     Diet    Follow this diet upon discharge: Orders Placed This Encounter      Room Service      Regular Diet Adult      Diet       Hospital Follow-up with Existing Primary Care Provider (PCP)    Please see details below                Consultations This Hospital Stay:      CARE MANAGEMENT / SOCIAL WORK IP CONSULT  PHARMACY IP CONSULT  PHYSICAL THERAPY ADULT IP CONSULT  OCCUPATIONAL THERAPY ADULT IP CONSULT  PHYSICAL THERAPY ADULT IP CONSULT  OCCUPATIONAL THERAPY ADULT IP CONSULT        Admission History:      Please see the H&P by Marylin Coronado MD  "on 1/3/2025 for complete details. Briefly, Deborah \"Jose" Neftaly is a 89-year-old female with past medical history significant for hypertension; hyperlipidemia; and CKD stage III; who lives at assisted facility; who presented to [facility] on 12/29/2024 with chief complaint of fall likely mechanical and was diagnosed with multiple rib fractures after she complained of left-sided chest pain. She was [facility] to [facility] for pulmonary hygiene, respiratory monitoring and pain control. She was medically stabilized with subsequent plan for TCU. Transitioned to longterm care status 1/3/2024.    On initial evaluation 12/29, was afebrile, hypertensive, tachypneic, sats in 90's. Labs notable for CBC normal; BMP with BUN 26.3, cr 1.19.    CXR showed acute mildly displaced fractures of the anterior left fifth, sixth, seventh, eighth, and ninth ribs; question trace left pleural fluid; no significant pneumothorax.     CT chest w/o contrast showed subtle nondisplaced fractures involving the left fourth and left 10th ribs; several old bilateral rib fractures and old sternal fracture; scattered fibrotic changes and subsegmental atelectasis both lungs, otherwise no acute pulmonary disease.        Problem Oriented Hospital Course:         Left 4th and 10th rib fractures (non-displaced) secondary to mechanical fall.  Chest congestion.  * Initial presentation as above. Admitted under observation and started on analgesics, respiratory cares.  * 1/6: Pain controlled.  - Continue acetaminophen 975 mg q6h; lidocaine patch 4%, 2 patches daily; PRN acetaminophen, PRN oxycodone, minimize opioids as able.  - Continue guaifenesin 600 mg BID; PRN guaifenesin.  - Continue to encourage deep breaths, activity as tolerated and incentive spirometry.  - Continue PT and OT - planned TCU, but insurance not approved.  - Plan discharge home with home RN, PT and OT.    Acute kidney injury on CKD stage 3, suspect prerenal.  * " "Baseline creatinine is around 1.2-1.3.  * Cr 1.19 on admit.  * Cr up to 1.61 on 12/31/2024.  * Given IVF's. PTA bumetanide and spironolactone held, restarted 1/3.  No results for input(s): \"CR\" in the last 168 hours.  Estimated Creatinine Clearance: 35.4 mL/min (A) (based on SCr of 0.99 mg/dL (H)).  - Continue to monitor BMP.  - Avoid nephrotoxic medications.    Hyponatremia, suspect nutritional/hypovolemia.  * Sodium normal on admit.   * Sodium 131 on 1/1.  * Given IVF's this hospitalization.  * Sodium 134 on 1/2.     Hypertension (benign essential).  Peripheral edema.  [PTA: bumetanide 2 mg daily; carvedilol 25 mg BID; spironolactone 25 mg daily.]  * PTA bumetanide and spironolactone held for NICK, restarted 1/3.  - Continue bumetanide, carvedilol, and spironolactone.    Hyperlipidemia/dyslipidemia.  - Continue pravastatin.        Clinically Significant Risk Factors Present on Admission                   # Hypertension: Noted on problem list           # Obesity: Estimated body mass index is 33.29 kg/m  as calculated from the following:    Height as of 12/29/24: 1.575 m (5' 2\").    Weight as of 12/29/24: 82.6 kg (182 lb).                    Pending Results:        Unresulted Labs Ordered in the Past 30 Days of this Admission       No orders found from 12/4/2024 to 1/4/2025.                  Discharge Disposition:      Discharge to home with home health services.        Discharge Time:      Approximately 30 minutes          Condition and Physical on Discharge:    See progress note on the same date as this discharge summary.          Key Imaging Studies, Lab Findings and Procedures/Surgeries:        Results for orders placed or performed during the hospital encounter of 12/29/24   Ribs XR, unilat 3 views + PA chest,  left    Narrative    EXAM: XR RIBS AND CHEST LEFT 3 VIEWS  LOCATION: [facility]  DATE: 12/29/2024    INDICATION: [Chest wall pain after a fall.  COMPARISON: None.      Impression "    IMPRESSION: Acute mildly displaced fractures of the anterior left fifth, sixth, seventh, eighth, and ninth ribs. Question trace left pleural fluid. No significant pneumothorax. Bones are demineralized. Thoracolumbar kyphoscoliosis. Degenerative changes   in the spine. Cardiomegaly. Tortuous and atherosclerotic thoracic aorta. Hyperinflated lungs compatible with underlying COPD. Extensive surgical clips in the left chest wall.   Chest CT w/o contrast    Narrative    EXAM: CT CHEST W/O CONTRAST  LOCATION: [facility]  DATE: 12/29/2024    INDICATION: chest wall pain, known rib fractures  COMPARISON: Chest x-ray 12/29/2024  TECHNIQUE: CT chest without IV contrast. Multiplanar reformats were obtained. Dose reduction techniques were used.  CONTRAST: None.    FINDINGS:   LUNGS AND PLEURA: The lungs with subsegmental atelectasis both lower lobes. Scattered strands of fibrosis both lungs. No acute infiltrates or effusions. No pneumothorax.    MEDIASTINUM/AXILLAE: No mediastinal hemorrhage. No lymphadenopathy. Ascending thoracic aorta upper range of normal in size measuring 3.9 x 3.8 cm. Advanced atherosclerotic disease thoracic aorta.    CORONARY ARTERY CALCIFICATION: Severe.    UPPER ABDOMEN: Advanced atherosclerotic disease abdominal aorta.    MUSCULOSKELETAL: Nondisplaced fracture anterior left fourth rib and lateral left 10th rib. Old fracture lateral left ninth rib. Old lateral right fifth rib fracture. Old mid sternal fracture. Osteopenia. Scoliosis. Advanced degenerative disc disease   thoracic and upper lumbar spine. Surgical clips anterior left chest wall.      Impression    IMPRESSION:   1.  Subtle nondisplaced fractures involving the left fourth and left 10th ribs.  2.  Several old bilateral rib fractures and old sternal fracture.  3.  Scattered fibrotic changes and subsegmental atelectasis both lungs, otherwise no acute pulmonary disease.  4.  Severe atherosclerotic disease.

## 2025-01-04 NOTE — PLAN OF CARE
PRIMARY Concern: fall, L 4th - 10th rib fx  SAFETY RISK Concerns (fall risk, behaviors, etc.): fall risk  Aggression Tool Color: Green  Isolation/Type: n/a  Tests/Procedures for NEXT shift: n/a  Consults? (Pending/following, signed-off?) Thoracic surgery signed off; PT rec TCU. SW following for placement.  Where is patient from? (Home, TCU, etc.): [facility]  Other Important info for NEXT shift: Intermittent cough. Started on mucinex. Encouraged IS use.  Anticipated DC date & active delays: TBD. TCU placement vs Home with HHC. Pt flip to intermediate care. Family refusing in network TCU's. Considering d/c'ing home with hired help and HHC.       SUMMARY NOTE:  Orientation/Cognitive: AOX4, forgetful, [place].   Observation Goals (Met/ Not Met): detention care  Mobility Level/Assist Equipment: Ax1 gb/W  Antibiotics & Plan (IV/po, length of tx left): n/a  Pain Management: Scheduled tylenol, lido patches.   Tele/VS/O2: VSS on RA  ABNL Lab/BG: Na 134.   Diet: Reg  Bowel/Bladder: occasionally incont urine, voiding well. Ambulates to bathroom. No BM this shift. Continue with stool softeners.   Skin Concerns: scattered bruises/edema LLE, redness to coccyx. Compression stocking.  Drains/Devices: PIV SL.   Patient Stated Goal for Today: sleep

## 2025-01-05 PROCEDURE — 250N000013 HC RX MED GY IP 250 OP 250 PS 637: Performed by: PHYSICIAN ASSISTANT

## 2025-01-05 PROCEDURE — 101N000002 HC CUSTODIAL CARE DAILY

## 2025-01-05 PROCEDURE — 250N000011 HC RX IP 250 OP 636: Performed by: PHYSICIAN ASSISTANT

## 2025-01-05 PROCEDURE — 99207 PR NO BILLABLE SERVICE THIS VISIT: CPT | Performed by: INTERNAL MEDICINE

## 2025-01-05 PROCEDURE — 96372 THER/PROPH/DIAG INJ SC/IM: CPT | Performed by: PHYSICIAN ASSISTANT

## 2025-01-05 RX ADMIN — ACETAMINOPHEN 975 MG: 325 TABLET, FILM COATED ORAL at 18:21

## 2025-01-05 RX ADMIN — MAGNESIUM HYDROXIDE 30 ML: 400 SUSPENSION ORAL at 08:20

## 2025-01-05 RX ADMIN — HEPARIN SODIUM 5000 UNITS: 5000 INJECTION, SOLUTION INTRAVENOUS; SUBCUTANEOUS at 11:25

## 2025-01-05 RX ADMIN — POTASSIUM CHLORIDE 10 MEQ: 750 TABLET, EXTENDED RELEASE ORAL at 08:20

## 2025-01-05 RX ADMIN — ACETAMINOPHEN 975 MG: 325 TABLET, FILM COATED ORAL at 11:25

## 2025-01-05 RX ADMIN — LIDOCAINE 2 PATCH: 4 PATCH TOPICAL at 08:20

## 2025-01-05 RX ADMIN — SPIRONOLACTONE 25 MG: 25 TABLET ORAL at 08:20

## 2025-01-05 RX ADMIN — ACETAMINOPHEN 975 MG: 325 TABLET, FILM COATED ORAL at 06:32

## 2025-01-05 RX ADMIN — HEPARIN SODIUM 5000 UNITS: 5000 INJECTION, SOLUTION INTRAVENOUS; SUBCUTANEOUS at 03:25

## 2025-01-05 RX ADMIN — SENNOSIDES 2 TABLET: 8.6 TABLET, FILM COATED ORAL at 20:27

## 2025-01-05 RX ADMIN — GUAIFENESIN 600 MG: 600 TABLET, EXTENDED RELEASE ORAL at 08:20

## 2025-01-05 RX ADMIN — CARVEDILOL 25 MG: 25 TABLET, FILM COATED ORAL at 18:21

## 2025-01-05 RX ADMIN — HEPARIN SODIUM 5000 UNITS: 5000 INJECTION, SOLUTION INTRAVENOUS; SUBCUTANEOUS at 18:21

## 2025-01-05 RX ADMIN — CARVEDILOL 25 MG: 25 TABLET, FILM COATED ORAL at 08:20

## 2025-01-05 RX ADMIN — BUMETANIDE 2 MG: 1 TABLET ORAL at 08:20

## 2025-01-05 RX ADMIN — PRAVASTATIN SODIUM 40 MG: 40 TABLET ORAL at 20:27

## 2025-01-05 RX ADMIN — POTASSIUM CHLORIDE 10 MEQ: 750 TABLET, EXTENDED RELEASE ORAL at 11:25

## 2025-01-05 RX ADMIN — ACETAMINOPHEN 975 MG: 325 TABLET, FILM COATED ORAL at 00:03

## 2025-01-05 RX ADMIN — GUAIFENESIN 600 MG: 600 TABLET, EXTENDED RELEASE ORAL at 20:27

## 2025-01-05 ASSESSMENT — ACTIVITIES OF DAILY LIVING (ADL)
ADLS_ACUITY_SCORE: 62
ADLS_ACUITY_SCORE: 65
ADLS_ACUITY_SCORE: 62
ADLS_ACUITY_SCORE: 62
ADLS_ACUITY_SCORE: 65
ADLS_ACUITY_SCORE: 62
ADLS_ACUITY_SCORE: 65
ADLS_ACUITY_SCORE: 65
ADLS_ACUITY_SCORE: 62
ADLS_ACUITY_SCORE: 62
ADLS_ACUITY_SCORE: 65
ADLS_ACUITY_SCORE: 62
ADLS_ACUITY_SCORE: 65
ADLS_ACUITY_SCORE: 62

## 2025-01-05 NOTE — PROGRESS NOTES
"[facility]    Internal Medicine Hospitalist Progress Note  01/05/2025  I evaluated patient on the above date.    Linus Lizama Jr., MD  [phone] (p)  Text Page  Vocera      [New actions/orders today (01/05/2025) are underlined in the assessment and plan. All lab results in the assessment and plan were reviewed.]  Assessment & Plan   Deborah \"Tammi\" Neftaly is a 89-year-old female with past medical history significant for hypertension; hyperlipidemia; and CKD stage III; who lives at assisted facility; who presented to [facility] on 12/29/2024 with chief complaint of fall likely mechanical and was diagnosed with multiple rib fractures after she complained of left-sided chest pain. [facility] for pulmonary hygiene, respiratory monitoring and pain control. She was medically stabilized with subsequent plan for TCU. Transitioned to skilled nursing care status 1/3/2024.    On initial evaluation 12/29, was afebrile, hypertensive, tachypneic, sats in 90's. Labs notable for CBC normal; BMP with BUN 26.3, cr 1.19.    CXR showed acute mildly displaced fractures of the anterior left fifth, sixth, seventh, eighth, and ninth ribs; question trace left pleural fluid; no significant pneumothorax.     CT chest w/o contrast showed subtle nondisplaced fractures involving the left fourth and left 10th ribs; several old bilateral rib fractures and old sternal fracture; scattered fibrotic changes and subsegmental atelectasis both lungs, otherwise no acute pulmonary disease.       Left 4th and 10th rib fractures (non-displaced) secondary to mechanical fall.  Chest congestion.  * Initial presentation as above. Admitted under observation and started on analgesics, respiratory cares.  - Continue acetaminophen 975 mg q6h; lidocaine patch 4%, 2 patches daily; PRN acetaminophen, PRN methocarbamol, PRN oxycodone, minimize opioids as able.  - Continue guaifenesin 600 mg BID; PRN " "guaifenesin, PRN benzonatate.   - Continue PT and OT - plan TCU once bed availability (noted family declined 2 previous TCU's she was accepted to).  - Continue to encourage deep breaths, activity as tolerated and incentive spirometry.    Acute kidney injury on CKD stage 3, suspect prerenal.  * Baseline creatinine is around 1.2-1.3.  * Cr 1.19 on admit.  * Cr up to 1.61 on 12/31/2024.  * Given IVF's. PTA bumetanide and spironolactone held, restarted 1/3.  Recent Labs   Lab 01/02/25  1140 01/01/25  0840 12/31/24  1103 12/30/24  0931 12/29/24  1544   CR 0.99* 1.25* 1.61* 1.10* 1.19*   Estimated Creatinine Clearance: 35.4 mL/min (A) (based on SCr of 0.99 mg/dL (H)).  - Continue to monitor BMP outpatient.  - Avoid nephrotoxic medications.    Hyponatremia, suspect nutritional/hypovolemia.  * Sodium normal on admit.   * Sodium 131 on 1/1.  * Given IVF's this hospitalization.  * Sodium 134 on 1/2.     Hypertension (benign essential).  Peripheral edema.  [PTA: bumetanide 2 mg daily; carvedilol 25 mg BID; spironolactone 25 mg daily.]  * PTA bumetanide and spironolactone held for NICK, restarted 1/3.  - Continue bumetanide, carvedilol, and spironolactone.    Hyperlipidemia/dyslipidemia.  - Continue pravastatin.      Clinically Significant Risk Factors Present on Admission                   # Hypertension: Noted on problem list           # Obesity: Estimated body mass index is 33.29 kg/m  as calculated from the following:    Height as of 12/29/24: 1.575 m (5' 2\").    Weight as of 12/29/24: 82.6 kg (182 lb).              Diet: Regular Diet Adult  Room Service  Diet    Prophylaxis: PCD's and ambulation  Ferraro Catheter: Not present  Lines: None     Code Status: No CPR- Do NOT Intubate    Disposition Plan   Medically Ready for Discharge: Ready Now  Expected discharge to home with family pending TCU/rehab/facility bed availability.    Entered: Linus Lizama MD 01/05/2025, 8:18 AM           Interval History   Doing " better.  Pain 5/10 presently.    * Data reviewed today: I reviewed all new labs and imaging over the last 24 hours. I personally reviewed no images or ECG's today.    Physical Exam   Most recent vitals:   , Blood pressure 124/60, pulse 53, temperature 97.9  F (36.6  C), temperature source Oral, resp. rate 16, SpO2 95%. O2 Device: None (Room air)    There were no vitals filed for this visit.  Vital signs with ranges:  Temp:  [97.4  F (36.3  C)-98  F (36.7  C)] 97.9  F (36.6  C)  Pulse:  [53-66] 53  Resp:  [16-18] 16  BP: (114-132)/(60-66) 124/60  SpO2:  [94 %-97 %] 95 %  Patient Vitals for the past 24 hrs:   BP Temp Temp src Pulse Resp SpO2   01/05/25 0736 124/60 97.9  F (36.6  C) Oral 53 16 95 %   01/05/25 0715 -- -- -- -- 16 --   01/05/25 0632 -- -- -- -- 16 --   01/05/25 0400 -- -- -- -- 16 --   01/05/25 0320 132/61 98  F (36.7  C) Oral 58 16 96 %   01/05/25 0052 -- -- -- -- 16 --   01/05/25 0003 -- -- -- -- 16 --   01/04/25 1620 117/66 97.6  F (36.4  C) Oral 63 18 97 %   01/04/25 1100 114/66 97.4  F (36.3  C) Oral 66 18 94 %     I/O's last 24 hours:  No intake/output data recorded.    Constitutional: awake, alert, oriented, pleasant   Head:   Eyes:   ENT:   Neck:   Cardiovascular: regular rate and rhythm; no murmurs, rubs or gallops  Lungs: diminished in the bases, no crackles or wheezes  Gastrointestinal/Abdomen: soft, non-tender, non-distended, positive bowel sounds  :   Musculoskeletal:   Skin/Extremities:   Neurologic:   Psychiatric:   Hematologic/Lymphatic/Immunologic:        Labs reviewed.  Recent Labs   Lab 01/03/25  0824 01/02/25  1140 01/01/25  0840 12/31/24  1103 12/30/24  0931 12/29/24  1544   WBC  --   --   --   --  7.9 7.0   HGB  --   --   --   --  13.7 13.6   MCV  --   --   --   --  94 93     --   --   --  218 212   NA  --  134* 131* 137 137 137   POTASSIUM  --  3.8 4.0 4.4 4.1 4.4   CHLORIDE  --  102 98 101 103 101   CO2  --  21* 20* 20* 21* 24   BUN  --  26.0* 35.2* 40.2* 27.6* 26.3*   CR  " --  0.99* 1.25* 1.61* 1.10* 1.19*   ANIONGAP  --  11 13 16* 13 12   ARABELLA  --  9.0 9.1 9.8 9.7 9.9   GLC  --  117* 111* 138* 139* 123*     Recent Labs   Lab Test 01/07/23  1241 10/04/18  1550   NT-PROBNP, INPATIENT 1,013 349     Recent Labs   Lab 01/02/25  1140 01/01/25  0840 12/31/24  1103 12/30/24  0931 12/29/24  1544   * 111* 138* 139* 123*     No lab results found.    No results for input(s): \"INR\", \"[id number]\" in the last 168 hours.  Recent Labs   Lab 12/30/24  0931 12/29/24  1544   WBC 7.9 7.0       MICRO:  Cultures (including blood and urine):  No lab results found in last 7 days.    No results found for this or any previous visit (from the past 24 hours).    Medications   All medications were reviewed. MAR.    Infusions:  Current Facility-Administered Medications   Medication Dose Route Frequency Provider Last Rate Last Admin     Scheduled Medications:  Current Facility-Administered Medications   Medication Dose Route Frequency Provider Last Rate Last Admin    acetaminophen (TYLENOL) tablet 975 mg  975 mg Oral Q6H Crystal Falcon PA-C   975 mg at 01/05/25 0632    bumetanide (BUMEX) tablet 2 mg  2 mg Oral Daily Crystal Falcon PA-C   2 mg at 01/04/25 0758    carvedilol (COREG) tablet 25 mg  25 mg Oral BID w/meals Crystal Falcon PA-C   25 mg at 01/04/25 1722    guaiFENesin (MUCINEX) 12 hr tablet 600 mg  600 mg Oral BID Crystal Falcon PA-C   600 mg at 01/04/25 1909    heparin ANTICOAGULANT injection 5,000 Units  5,000 Units Subcutaneous Q8H Crystal Falcon PA-C   5,000 Units at 01/05/25 0325    Lidocaine (LIDOCARE) 4 % Patch 2 patch  2 patch Transdermal Q24H Crystal Falcon PA-C   2 patch at 01/04/25 0757    magnesium hydroxide (MILK OF MAGNESIA) suspension 30 mL  30 mL Oral Daily Crystal Falcon, RALF   30 mL at 01/04/25 0757    potassium chloride payton ER (KLOR-CON M10) CR tablet 10 mEq  10 mEq Oral BID Crystal Falcon, " RALF   10 mEq at 01/04/25 1104    pravastatin (PRAVACHOL) tablet 40 mg  40 mg Oral At Bedtime Crystal Falcon PA-C   40 mg at 01/04/25 2148    sennosides (SENOKOT) tablet 2 tablet  2 tablet Oral At Bedtime Crystal Falcon PA-C   2 tablet at 01/04/25 2148    sodium chloride (PF) 0.9% PF flush 3 mL  3 mL Intracatheter Q8H Crystal Falcon PA-C   3 mL at 01/05/25 0327    spironolactone (ALDACTONE) tablet 25 mg  25 mg Oral Daily Crystal Falcon PA-C   25 mg at 01/04/25 0758     PRN Medications:  Current Facility-Administered Medications   Medication Dose Route Frequency Provider Last Rate Last Admin    acetaminophen (TYLENOL) tablet 650 mg  650 mg Oral Q12H PRN Crystal Falcon PA-C        benzonatate (TESSALON) capsule 100 mg  100 mg Oral TID PRN Crystal Falcon PA-C        guaiFENesin (ROBITUSSIN) 20 mg/mL solution 200 mg  200 mg Oral Q4H PRN Crystal Falcon PA-C        methocarbamol (ROBAXIN) tablet 500 mg  500 mg Oral TID PRN Crystal Falcon PA-C        naloxone (NARCAN) injection 0.2 mg  0.2 mg Intravenous Q2 Min PRN Crystal Falcon PA-C        Or    naloxone (NARCAN) injection 0.4 mg  0.4 mg Intravenous Q2 Min PRN Crystal Falcon PA-C        Or    naloxone (NARCAN) injection 0.2 mg  0.2 mg Intramuscular Q2 Min PRN Crystal Falcon PA-C        Or    naloxone (NARCAN) injection 0.4 mg  0.4 mg Intramuscular Q2 Min PRN Crystal Falcon PA-C        ondansetron (ZOFRAN ODT) ODT tab 4 mg  4 mg Oral Q6H PRN Crystal Falcon PA-C        Or    ondansetron (ZOFRAN) injection 4 mg  4 mg Intravenous Q6H PRN Crystal Falcon PA-C        oxyCODONE (ROXICODONE) tablet 5 mg  5 mg Oral Q4H PRN Crystal Falcon PA-C        oxyCODONE IR (ROXICODONE) half-tab 2.5 mg  2.5 mg Oral Q4H PRN Crystal Falcon PA-C        prochlorperazine (COMPAZINE) injection 5 mg  5 mg Intravenous Q6H PRN  Crystal Falcon PA-C        Or    prochlorperazine (COMPAZINE) tablet 5 mg  5 mg Oral Q6H PRN Crystal Falcon PA-C        senna-docusate (SENOKOT-S/PERICOLACE) 8.6-50 MG per tablet 1 tablet  1 tablet Oral BID PRN Crystal Falcon PA-C        Or    senna-docusate (SENOKOT-S/PERICOLACE) 8.6-50 MG per tablet 2 tablet  2 tablet Oral BID PRN Crystal Falcon PA-C        sodium chloride (PF) 0.9% PF flush 3 mL  3 mL Intracatheter q1 min prn Crystal Falcon PA-C

## 2025-01-05 NOTE — PROGRESS NOTES
"Care Management Follow Up    Length of Stay (days): 0    Expected Discharge Date: 01/06/2025     Concerns to be Addressed:       Patient plan of care discussed at interdisciplinary rounds: Yes    Anticipated Discharge Disposition:                Anticipated Discharge Services:    Anticipated Discharge DME:      Patient/family educated on Medicare website which has current facility and service quality ratings:    Education Provided on the Discharge Plan:    Patient/Family in Agreement with the Plan:      Referrals Placed by CM/SW:    Private pay costs discussed: Not applicable    Discussed  Partnership in Safe Discharge Planning  document with patient/family: No     Handoff Completed: No, handoff not indicated or clinically appropriate    Additional Information:  Patient was clinically accepted at [facility]CU for tomorrow pending [employer] insurance authorization is approved.   Writer met with patient and daughter Briana who was present at bedside to notify of this. Briana is aware that there is the possibility that insurance may not approve. Writer inquired in the event insurance does not approve, what would be their next plan for discharge - Briana stated she \"does not know at this time and will wait to see what humana says.\"  Briana requested that she be reached by phone call tomorrow with updates. Briana stated she would be able to provide transportation tomorrow after 1000 when she is off work. Should patient need to discharge to an accepting facility before 1000, her brother would be able to provide transportation.   Writer left voicemail with Maite Roth at [place] to call [facility] on unit to confirm they are running insurance and with outcome.   SW to call Briana with any updates.     Will continue to follow.      Next Steps: Await insurance authorization results.    NAVI Mahan, LGSW    [facility]       "

## 2025-01-05 NOTE — PLAN OF CARE
Goal Outcome Evaluation:         PRIMARY Concern: fall, L 4th - 10th rib fx  SAFETY RISK Concerns (fall risk, behaviors, etc.): fall risk  Aggression Tool Color: Green  Isolation/Type: n/a  Tests/Procedures for NEXT shift: n/a  Consults? (Pending/following, signed-off?) Thoracic surgery signed off; PT rec TCU. SW following for placement.  Where is patient from? (Home, TCU, etc.): [facility]  Other Important info for NEXT shift: Intermittent cough. Started on mucinex. Encouraged IS use.  Anticipated DC date & active delays: TBD. TCU placement vs Home with HHC. Pt flipped to [facility] care. Family refusing in network TCU's. Considering d/c'ing home with hired help and HHC.- Plan for Monday 1/6/24 re-eval of TCU availability-     SUMMARY NOTE:  Orientation/Cognitive: AOX4, forgetful, [place].   Observation Goals (Met/ Not Met): group home care  Mobility Level/Assist Equipment: Ax1 gb/W  Antibiotics & Plan (IV/po, length of tx left): n/a  Pain Management: Scheduled tylenol, lido patches; up to chair during the night and able to sleep much better   Tele/VS/O2: VSS on RA  ABNL Lab/BG: Na 134.   Bowel/Bladder: occasionally incont urine, voiding well. Ambulates to bathroom. No BM this shift. Continue with stool softeners.   Skin Concerns: scattered bruises/edema LLE, redness to coccyx. Compression stocking.  Drains/Devices: PIV SL.   Patient Stated Goal for Today: sleep

## 2025-01-05 NOTE — PROGRESS NOTES
[id number]  PRIMARY Concern: fall, L 4th - 10th rib fx  SAFETY RISK Concerns (fall risk, behaviors, etc.): fall risk  Aggression Tool Color: Green  Isolation/Type: n/a  Tests/Procedures for NEXT shift: n/a  Consults? (Pending/following, signed-off?) Thoracic surgery signed off; PT rec TCU. SW following for placement.  Where is patient from? (Home, TCU, etc.): [facility]  Other Important info for NEXT shift: Intermittent cough. Started on mucinex. Encouraged IS use.  Anticipated DC date & active delays: TBD. TCU placement vs Home with HHC. Pt flip to [facility] care. Family refusing in network TCU's. Considering d/c'ing home with hired help and HHC.- Plan for Monday 1/6/24 re-eval of TCU availability-     SUMMARY NOTE:  Orientation/Cognitive: AOX4, forgetful, [place].   Observation Goals (Met/ Not Met): [facility] care  Mobility Level/Assist Equipment: Ax1 gb/W  Antibiotics & Plan (IV/po, length of tx left): n/a  Pain Management: Scheduled tylenol, lido patches.   Tele/VS/O2: VSS on RA  ABNL Lab/BG: Na 134.   Bowel/Bladder: occasionally incont urine, voiding well. Ambulates to bathroom. No BM this shift. Continue with stool softeners.   Skin Concerns: scattered bruises/edema LLE, redness to coccyx. Compression stocking.  Drains/Devices: PIV SL.   Patient Stated Goal for Today: sleep

## 2025-01-05 NOTE — PROGRESS NOTES
Care Management Follow Up    Length of Stay (days): 0    Expected Discharge Date: 01/06/2025     Concerns to be Addressed:       Patient plan of care discussed at interdisciplinary rounds: Yes    Anticipated Discharge Disposition:                Anticipated Discharge Services:    Anticipated Discharge DME:      Patient/family educated on Medicare website which has current facility and service quality ratings:    Education Provided on the Discharge Plan:    Patient/Family in Agreement with the Plan:      Referrals Placed by CM/SW:    Private pay costs discussed: Not applicable    Discussed  Partnership in Safe Discharge Planning  document with patient/family: No     Handoff Completed: No, handoff not indicated or clinically appropriate    Additional Information:  Writer received email from patient's daughter, Briana, requesting for a referral to be sent to [facility]CU. Writer sent via DOD for bed availability.   Briana is aware that if a facility has an accepting bed, there is still an insurance authorization that needs to be approved prior to discharge, and that there is the possibility of insurance not approving.    Next Steps: Secure TCU bed.     Will Continue to follow.    NAVI Mahan, LGSW    [facility]

## 2025-01-06 ENCOUNTER — APPOINTMENT (OUTPATIENT)
Dept: PHYSICAL THERAPY | Facility: CLINIC | Age: OVER 89
End: 2025-01-06
Attending: HOSPITALIST
Payer: COMMERCIAL

## 2025-01-06 ENCOUNTER — APPOINTMENT (OUTPATIENT)
Dept: OCCUPATIONAL THERAPY | Facility: CLINIC | Age: OVER 89
End: 2025-01-06
Attending: INTERNAL MEDICINE
Payer: COMMERCIAL

## 2025-01-06 LAB — PLATELET # BLD AUTO: 243 10E3/UL (ref 150–450)

## 2025-01-06 PROCEDURE — 97530 THERAPEUTIC ACTIVITIES: CPT | Mod: GP

## 2025-01-06 PROCEDURE — 96372 THER/PROPH/DIAG INJ SC/IM: CPT | Performed by: PHYSICIAN ASSISTANT

## 2025-01-06 PROCEDURE — 97116 GAIT TRAINING THERAPY: CPT | Mod: GP

## 2025-01-06 PROCEDURE — 36415 COLL VENOUS BLD VENIPUNCTURE: CPT | Performed by: PHYSICIAN ASSISTANT

## 2025-01-06 PROCEDURE — 85049 AUTOMATED PLATELET COUNT: CPT | Performed by: PHYSICIAN ASSISTANT

## 2025-01-06 PROCEDURE — 250N000011 HC RX IP 250 OP 636: Performed by: PHYSICIAN ASSISTANT

## 2025-01-06 PROCEDURE — 97165 OT EVAL LOW COMPLEX 30 MIN: CPT | Mod: GO | Performed by: OCCUPATIONAL THERAPIST

## 2025-01-06 PROCEDURE — 250N000013 HC RX MED GY IP 250 OP 250 PS 637: Performed by: PHYSICIAN ASSISTANT

## 2025-01-06 PROCEDURE — 101N000002 HC CUSTODIAL CARE DAILY

## 2025-01-06 PROCEDURE — 97535 SELF CARE MNGMENT TRAINING: CPT | Mod: GO | Performed by: OCCUPATIONAL THERAPIST

## 2025-01-06 PROCEDURE — 99207 PR NO BILLABLE SERVICE THIS VISIT: CPT | Performed by: INTERNAL MEDICINE

## 2025-01-06 RX ORDER — GUAIFENESIN 600 MG/1
600 TABLET, EXTENDED RELEASE ORAL 2 TIMES DAILY
Qty: 30 TABLET | Refills: 1 | Status: SHIPPED | OUTPATIENT
Start: 2025-01-06

## 2025-01-06 RX ORDER — LIDOCAINE 4 G/G
2 PATCH TOPICAL EVERY 24 HOURS
Qty: 14 PATCH | Refills: 1 | Status: SHIPPED | OUTPATIENT
Start: 2025-01-06

## 2025-01-06 RX ORDER — GUAIFENESIN 200 MG/10ML
200 LIQUID ORAL EVERY 4 HOURS PRN
Qty: 236 ML | Refills: 1 | Status: SHIPPED | OUTPATIENT
Start: 2025-01-06

## 2025-01-06 RX ORDER — ACETAMINOPHEN 500 MG
1000 TABLET ORAL 3 TIMES DAILY
Qty: 60 TABLET | Refills: 1 | Status: SHIPPED | OUTPATIENT
Start: 2025-01-06

## 2025-01-06 RX ORDER — OXYCODONE HYDROCHLORIDE 5 MG/1
2.5 TABLET ORAL EVERY 6 HOURS PRN
Qty: 10 TABLET | Refills: 0 | Status: SHIPPED | OUTPATIENT
Start: 2025-01-06

## 2025-01-06 RX ORDER — ACETAMINOPHEN 500 MG
500 TABLET ORAL 2 TIMES DAILY PRN
Qty: 30 TABLET | Refills: 1 | Status: SHIPPED | OUTPATIENT
Start: 2025-01-06

## 2025-01-06 RX ADMIN — ACETAMINOPHEN 975 MG: 325 TABLET, FILM COATED ORAL at 15:23

## 2025-01-06 RX ADMIN — POTASSIUM CHLORIDE 10 MEQ: 750 TABLET, EXTENDED RELEASE ORAL at 08:19

## 2025-01-06 RX ADMIN — ACETAMINOPHEN 975 MG: 325 TABLET, FILM COATED ORAL at 23:42

## 2025-01-06 RX ADMIN — GUAIFENESIN 600 MG: 600 TABLET, EXTENDED RELEASE ORAL at 19:45

## 2025-01-06 RX ADMIN — ACETAMINOPHEN 975 MG: 325 TABLET, FILM COATED ORAL at 06:42

## 2025-01-06 RX ADMIN — SENNOSIDES 2 TABLET: 8.6 TABLET, FILM COATED ORAL at 21:00

## 2025-01-06 RX ADMIN — PRAVASTATIN SODIUM 40 MG: 40 TABLET ORAL at 21:00

## 2025-01-06 RX ADMIN — BUMETANIDE 2 MG: 1 TABLET ORAL at 08:19

## 2025-01-06 RX ADMIN — HEPARIN SODIUM 5000 UNITS: 5000 INJECTION, SOLUTION INTRAVENOUS; SUBCUTANEOUS at 03:43

## 2025-01-06 RX ADMIN — POTASSIUM CHLORIDE 10 MEQ: 750 TABLET, EXTENDED RELEASE ORAL at 15:22

## 2025-01-06 RX ADMIN — GUAIFENESIN 600 MG: 600 TABLET, EXTENDED RELEASE ORAL at 08:19

## 2025-01-06 RX ADMIN — ACETAMINOPHEN 975 MG: 325 TABLET, FILM COATED ORAL at 00:07

## 2025-01-06 RX ADMIN — CARVEDILOL 25 MG: 25 TABLET, FILM COATED ORAL at 08:18

## 2025-01-06 RX ADMIN — HEPARIN SODIUM 5000 UNITS: 5000 INJECTION, SOLUTION INTRAVENOUS; SUBCUTANEOUS at 19:45

## 2025-01-06 RX ADMIN — MAGNESIUM HYDROXIDE 30 ML: 400 SUSPENSION ORAL at 08:19

## 2025-01-06 RX ADMIN — LIDOCAINE 2 PATCH: 4 PATCH TOPICAL at 08:19

## 2025-01-06 RX ADMIN — SPIRONOLACTONE 25 MG: 25 TABLET ORAL at 08:18

## 2025-01-06 ASSESSMENT — ACTIVITIES OF DAILY LIVING (ADL)
ADLS_ACUITY_SCORE: 62
PREVIOUS_RESPONSIBILITIES: MEAL PREP;LAUNDRY;MEDICATION MANAGEMENT;FINANCES
ADLS_ACUITY_SCORE: 62

## 2025-01-06 NOTE — PROGRESS NOTES
Date & Time: 1/5/24 1900-2300  Summary: Admitted due to a fall that cause Left 4th to 10 th rib fracture  Orientation/Cognitive: A&Ox4, forgetful and is  extremely [place]  Mobility Level/Assist Equipment: A1/GB/walker  Fall Risk (Y/N): Yes  Behavior Concerns: Green  Pain Management: Denies  Tele/VS/O2: VSS on RA  ABNL Lab/BG: WNL  Diet: Regular  Bowel/Bladder: Incontinent of bladder at times, up to the bathroom. No BM during the shift.  Skin Concerns: Generalized scattered bruising. Slight trace of edema present to BLLE. Blanchable redness to sacrum/coccyx  Drains/Devices: Right PIV SL  Tests/Procedures for next shift: None  Anticipated DC date & active delays: Pending placement for TCU  Other Important Information: SW/Care management following      /64 (BP Location: Right arm, Patient Position: Chair, Cuff Size: Adult Regular)   Pulse 58   Temp 98.6  F (37  C) (Oral)   Resp 18   SpO2 96%

## 2025-01-06 NOTE — PLAN OF CARE
"Goal Outcome Evaluation:  Left 4th and 10th rib fractures (non-displaced) secondary to mechanical fall.  Chest congestion.Acute kidney injury on CKD stage 3, suspect prerenal.      Orientation/Cognitive: A/O x4 calm/pleasant, denies general discomforts, short  of breath, chest tightness and abdominal discomforts   Observation Goals: met and stable  steady   Mobility Level/Assist Equipment: x1 G,B/W frequents bath use   Fall Risk (Y/N): yes due to recent fall and general weakness    Behavior Concerns: green and positive   Pain Management: denies general and pain discomforts   Tele/VS/O2: vss RA  ABNL Lab/BG: None   Diet: Regular diet need help with ordering due to [place]   Bowel/Bladder: continent but incontinent at times   Skin Concerns:  R hip and leg bruises/hematoma scattered edema BLEs   Drains/Devices: PIV SL    Tests/Procedures for next\": None   Anticipated DC date: pt's wants to TCU placement and insurance denies disposition but physically stable and steady to discharge home family supervision            "

## 2025-01-06 NOTE — PLAN OF CARE
Goal Outcome Evaluation:         PRIMARY Concern: fall, L 4th - 10th rib fx  SAFETY RISK Concerns (fall risk, behaviors, etc.): fall risk  Aggression Tool Color: Green  Isolation/Type: n/a  Tests/Procedures for NEXT shift: n/a  Consults? (Pending/following, signed-off?) Thoracic surgery signed off; PT rec TCU. SW following for placement.  Where is patient from? (Home, TCU, etc.): [facility]  Other Important info for NEXT shift: Intermittent cough. Started on mucinex. Encouraged IS use.  Anticipated DC date & active delays: TBD. TCU placement vs Home with HHC. Pt flipped to care home care. Family refusing in network TCU's. Considering d/c'ing home with hired help and HHC.- Plan for Monday 1/6/24 re-eval of TCU availability-     SUMMARY NOTE:  Orientation/Cognitive: AOX4, forgetful, [place].   Observation Goals (Met/ Not Met): skilled nursing care  Mobility Level/Assist Equipment: Ax1 gb/W  Antibiotics & Plan (IV/po, length of tx left): n/a  Pain Management: Scheduled tylenol, lido patches. Pt stated pain much better; only notices it with coughing.  Tele/VS/O2: VSS on RA  ABNL Lab/BG: Na 134.   Bowel/Bladder: occasionally incont urine, voiding well. Ambulates to bathroom. No BM this shift. LBM 1/4. Continue with stool softeners.   Skin Concerns: scattered bruises/edema BLEs, redness to coccyx. Compression stockings  Drains/Devices: PIV SL.   Patient Stated Goal for Today: sleep

## 2025-01-06 NOTE — CONSULTS
Care Management Medical nursing home Outpatient Consult    Care management consulted by provider for shelter assessment.  CM spoke with provider to discuss shelter case. Provider has confirmed patient is medically stable for discharge.    Background information: Patient was admitted on 12/29/24 after a fall. Patient was admitted to shelter care status on 1/3/25.     Care team recommended discharge disposition:  TCU    Resources needed for discharge: [employer] insurance auth    Discharge plan secured to meet patient's needs?  Yes    Estimated timeline for discharge:   greater than 24 hours    Barriers to discharge: Patient initially had two accepting facilities for TCU (family had provided these options) to which family later declined the facilities due to not liking the reviews on them. Insurance auth was pending with one of the accepting facilities, but then was retracted at family's request. Family initially refusing to send additional referrals in-network with pt's insurance, but later became agreeable with this and a new facility accepted patient. [employer] insurance auth is now pending.     Discussed above with provider & charge RN.      Next steps:       Anticipate patient will discharge within the next 24 hours to TCU once insurance authorization is approved. SW discussed with family that if insurance auth is denied, alternative plan would need to be in place, such as going home with family, home care, or adding services at pt's ILF.    Patient has an established discharge plan that can meet their needs as identified above.     Patient has transitioned to shelter care due to noted barriers to discharge.  CM met/spoke with patient/family at bedside.  Discussed patient no longer meets medical criteria for ongoing hospital level of care & medical insurance may not cover the ongoing hospital stay. CM team will continue work towards discharge plan to meet patient's needs.      JESSIE Veras  Social Work  M  [facility]

## 2025-01-06 NOTE — PROGRESS NOTES
PRIMARY Concern: fall, L 4th - 10th rib fx  SAFETY RISK Concerns (fall risk, behaviors, etc.): fall risk  Aggression Tool Color: Green  Isolation/Type: n/a  Tests/Procedures for NEXT shift: n/a  Consults? (Pending/following, signed-off?) Thoracic surgery signed off; PT rec TCU. SW following for placement.  Where is patient from? (Home, TCU, etc.): [facility]  Other Important info for NEXT shift: Intermittent cough. Encouraged IS use.  Anticipated DC date & active delays: TBD. TCU placement vs Home with HHC. Referrals sent. Pt flipped to [facility] care. Family refusing in network TCU's. Considering d/c'ing home with hired help and HHC.- Plan for Monday 1/6/24 re-eval of TCU availability-     SUMMARY NOTE:  Orientation/Cognitive: AOX4, forgetful, [place].   Observation Goals (Met/ Not Met): care home care  Mobility Level/Assist Equipment: Ax1 gb/W  Antibiotics & Plan (IV/po, length of tx left): n/a  Pain Management: Scheduled tylenol, lido patches.  Tele/VS/O2: VSS on RA  ABNL Lab/BG: Na 134.   Bowel/Bladder: occasionally incont urine, voiding well. Ambulates to bathroom. No BM this shift. LBM 1/4. Continue with stool softeners.   Skin Concerns: scattered bruises/edema BLEs, redness to coccyx. Compression stocking.  Drains/Devices: PIV SL.   Patient Stated Goal for Today: sleep

## 2025-01-06 NOTE — PROGRESS NOTES
01/06/25 1500   Appointment Info   Signing Clinician's Name / Credentials (OT) Jessi Rutherford OTR/L   Quick Adds   Quick Adds Certification   Living Environment   People in Home alone   Current Living Arrangements independent living facility   Home Accessibility no concerns   Living Environment Comments Pt lives in a level entry ILF. Walk in tub with grab bars but daughter reports the grab bars are slippery and unsafe. Pt reports comfort height toilet with grab bars   Self-Care   Usual Activity Tolerance good   Current Activity Tolerance fair   Regular Exercise No   Equipment Currently Used at Home walker, rolling   Fall history within last six months yes   Number of times patient has fallen within last six months 2   Activity/Exercise/Self-Care Comment Reports baseline independent w/ ADLs. 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 Daughter helps with the grocery shopping for Pt. Uses a 4WW for all mobility. Reports family unable to stay w/ Pt to assist. Pt reports independence with ADLs at baseline with walker   Instrumental Activities of Daily Living (IADL)   Previous Responsibilities meal prep;laundry;medication management;finances   IADL Comments Pt family assists with groceries and pt has a cleaning person   General Information   Onset of Illness/Injury or Date of Surgery 01/03/25   Referring Physician Linus Lizama MD   Patient/Family Therapy Goal Statement (OT) Pt would like pain to subside   Additional Occupational Profile Info/Pertinent History of Current Problem 89-year-old female with past medical history significant for hypertension; hyperlipidemia; and CKD stage III; who lives at assisted facility; who presented to [facility] on 12/29/2024 with chief complaint of fall likely mechanical and was diagnosed with multiple rib fractures after she complained of left-sided chest pain. She was admitted to [facility] for pulmonary hygiene, respiratory monitoring and pain control. She was  medically stabilized with subsequent plan for TCU. Transitioned to nursing home care status 1/3/2024.     On initial evaluation 12/29, was afebrile, hypertensive, tachypneic, sats in 90's. Labs notable for CBC normal; BMP with BUN 26.3, cr 1.19.     CXR showed acute mildly displaced fractures of the anterior left fifth, sixth, seventh, eighth, and ninth ribs; question trace left pleural fluid; no significant pneumothorax.     CT chest w/o contrast showed subtle nondisplaced fractures involving the left fourth and left 10th ribs; several old bilateral rib fractures and old sternal fracture; scattered fibrotic changes and subsegmental atelectasis both lungs, otherwise no acute pulmonary disease.        Left 4th and 10th rib fractures (non-displaced) secondary to mechanical fall.   Existing Precautions/Restrictions fall   Limitations/Impairments hearing   Cognitive Status Examination   Orientation Status orientation to person, place and time   Affect/Mental Status (Cognitive) WFL   Follows Commands WFL   Cognitive Status Comments Pt appears at her cognitive baseline, able to recall education   Visual Perception   Visual Impairment/Limitations WFL   Sensory   Sensory Comments WFL   Pain Assessment   Patient Currently in Pain Yes, see Vital Sign flowsheet   Posture   Posture forward head position;protracted shoulders   Range of Motion Comprehensive   Comment, General Range of Motion LUE limited due to pain with shoulder flexion, trunk ROM limited due to rib pain   Strength Comprehensive (MMT)   Comment, General Manual Muscle Testing (MMT) Assessment Global weakness, poor tolerance for activity   Bed Mobility   Comment (Bed Mobility) Pt seated upright in chair, will assess at a later date   Transfers   Transfer Comments CGA-min assist   Balance   Balance Comments Impaired in standing   Activities of Daily Living   BADL Assessment/Intervention bathing;lower body dressing;grooming;toileting   Bathing Assessment/Intervention    Comment, (Bathing) Min assist per clinical reasoning, poor tolerance for activity in standing   Lower Body Dressing Assessment/Training   Comment, (Lower Body Dressing) Max assist   Grooming Assessment/Training   Comment, (Grooming) Poor tolerance for activity in standing   Toileting   Comment, (Toileting) Min assist   Clinical Impression   Criteria for Skilled Therapeutic Interventions Met (OT) Yes, treatment indicated   OT Diagnosis Decline in ADL tolerance and independence   Influenced by the following impairments Rib fractures, fall   OT Problem List-Impairments impacting ADL problems related to;activity tolerance impaired;balance;mobility;range of motion (ROM);strength;pain   Assessment of Occupational Performance 3-5 Performance Deficits   Identified Performance Deficits Impaired dressing bathing toileting and tolerance for grooming   Planned Therapy Interventions (OT) ADL retraining;progressive activity/exercise   Clinical Decision Making Complexity (OT) problem focused assessment/low complexity   Risk & Benefits of therapy have been explained evaluation/treatment results reviewed;care plan/treatment goals reviewed;risks/benefits reviewed;current/potential barriers reviewed;participants voiced agreement with care plan;participants included;patient;daughter;son   OT Total Evaluation Time   OT Eval, Low Complexity Minutes (94843) 9   Therapy Certification   Medical Diagnosis Fall, rib fractures   Start of Care Date 01/06/25   Certification date from 01/06/25   Certification date to 01/15/25   OT Goals   Therapy Frequency (OT) 5 times/week   OT Predicted Duration/Target Date for Goal Attainment 01/15/25   OT Goals Hygiene/Grooming;Lower Body Dressing;Lower Body Bathing;Toilet Transfer/Toileting   OT: Hygiene/Grooming modified independent;using adaptive equipment;while standing   OT: Lower Body Dressing Modified independent;using adaptive equipment;including set-up/clothing retrieval   OT: Lower Body Bathing  Modified independent;using adaptive equipment   OT: Toilet Transfer/Toileting Modified independent;toilet transfer;cleaning and garment management;using adaptive equipment   Self-Care/Home Management   Self-Care/Home Mgmt/ADL, Compensatory, Meal Prep Minutes (76465) 29   Symptoms Noted During/After Treatment (Meal Preparation/Planning Training) fatigue   Treatment Detail/Skilled Intervention Ot; Pt attempted LE dressing, unable to bend or complete figure four. Pt educated on reacher and sock aid. Pt able to don socks with sock aid and min assist after tutorial. Pt completed sit to stand with CGA and walker. Pt ambulated to bathroom with cues for pacing for pain management. Pt completed toilet transfer with mi nassist for stand to sit with grab bar and min assist for sit to stand due to pain in ribs during attempt. Pt able to compelte pericares with sBA. Pt returned to chair. Educated on need for TCU, pt agreeable to recommendations. Pt seated in chair with alarm on and call light upon OT departure   OT Discharge Planning   OT Plan LE dressing with reacher and sock aid (review), AE for showering, simulated walk in shower transfer, toilet transfer, g/h in standing   OT Discharge Recommendation (DC Rec) Transitional Care Facility   OT Rationale for DC Rec Patient presents below baseline with dressing, bathing, toileting and grooming requiring Ax1 with all ADLs. Pt will require TCU at discharge as she lives alone and will not have assist with ADLs. OT will continue to follow   OT Brief overview of current status Min assist toilet transfer. Goals of therapy will be to address safe mobility and make recs for d/c to next level of care. Pt and RN will continue to follow all falls risk precautions as documented by RN staff while hospitalized.   Total Session Time   Timed Code Treatment Minutes 29   Total Session Time (sum of timed and untimed services) 38   [facility]                                                                                    OUTPATIENT OCCUPATIONAL THERAPY    PLAN OF TREATMENT FOR OUTPATIENT REHABILITATION   Patient's Last Name, First Name, Deborah Gomez YOB: 1931   Provider's Name   [facility]   Medical Record No.  [id number]     Onset Date: 01/03/25 Start of Care Date: (P) 01/06/25     Medical Diagnosis:  (P) Fall, rib fractures               OT Diagnosis:  Decline in ADL tolerance and independence Certification Dates:  From: (P) 01/06/25  To: (P) 01/15/25     See note for plan of treatment, functional goals, and certification details.    I CERTIFY THE NEED FOR THESE SERVICES FURNISHED UNDER        THIS PLAN OF TREATMENT AND WHILE UNDER MY CARE (Physician co-signature of this document indicates review and certification of the therapy plan).

## 2025-01-06 NOTE — PROGRESS NOTES
"[facility]    Internal Medicine Hospitalist Progress Note  01/06/2025  I evaluated patient on the above date.    Linus Lizama Jr., MD  [phone] (p)  Text Page  Vocera      [New actions/orders today (01/06/2025) are underlined in the assessment and plan. All lab results in the assessment and plan were reviewed.]  Assessment & Plan   Deborah \"Tammi\" Neftaly is a 89-year-old female with past medical history significant for hypertension; hyperlipidemia; and CKD stage III; who lives at assisted facility; who presented to [facility] on 12/29/2024 with chief complaint of fall likely mechanical and was diagnosed with multiple rib fractures after she complained of left-sided chest pain. [facility] respiratory [facility] and pain control. She was medically stabilized with subsequent plan for TCU. Transitioned to [facility] care status 1/3/2024.    On initial evaluation 12/29, was afebrile, hypertensive, tachypneic, sats in 90's. Labs notable for CBC normal; BMP with BUN 26.3, cr 1.19.    CXR showed acute mildly displaced fractures of the anterior left fifth, sixth, seventh, eighth, and ninth ribs; question trace left pleural fluid; no significant pneumothorax.     CT chest w/o contrast showed subtle nondisplaced fractures involving the left fourth and left 10th ribs; several old bilateral rib fractures and old sternal fracture; scattered fibrotic changes and subsegmental atelectasis both lungs, otherwise no acute pulmonary disease.       Left 4th and 10th rib fractures (non-displaced) secondary to mechanical fall.  Chest congestion.  * Initial presentation as above. Admitted under observation and started on analgesics, respiratory cares.  * 1/6: Pain controlled.   - Called [employer] insurance for authorization, but they declined insurance coverage.  - Continue acetaminophen 975 mg q6h; lidocaine patch 4%, 2 patches daily; PRN " "acetaminophen, PRN methocarbamol, PRN oxycodone, minimize opioids as able.  - Continue guaifenesin 600 mg BID; PRN guaifenesin, PRN benzonatate.   - Continue PT and OT - planned TCU, but given no insurance coverage, may need to discharge home with family with home cares.  - Continue to encourage deep breaths, activity as tolerated and incentive spirometry.    Acute kidney injury on CKD stage 3, suspect prerenal.  * Baseline creatinine is around 1.2-1.3.  * Cr 1.19 on admit.  * Cr up to 1.61 on 12/31/2024.  * Given IVF's. PTA bumetanide and spironolactone held, restarted 1/3.  Recent Labs   Lab 01/02/25  1140 01/01/25  0840 12/31/24  1103   CR 0.99* 1.25* 1.61*   Estimated Creatinine Clearance: 35.4 mL/min (A) (based on SCr of 0.99 mg/dL (H)).  - Continue to monitor BMP outpatient.  - Avoid nephrotoxic medications.    Hyponatremia, suspect nutritional/hypovolemia.  * Sodium normal on admit.   * Sodium 131 on 1/1.  * Given IVF's this hospitalization.  * Sodium 134 on 1/2.     Hypertension (benign essential).  Peripheral edema.  [PTA: bumetanide 2 mg daily; carvedilol 25 mg BID; spironolactone 25 mg daily.]  * PTA bumetanide and spironolactone held for NICK, restarted 1/3.  - Continue bumetanide, carvedilol, and spironolactone.    Hyperlipidemia/dyslipidemia.  - Continue pravastatin.      Clinically Significant Risk Factors Present on Admission                   # Hypertension: Noted on problem list           # Obesity: Estimated body mass index is 33.29 kg/m  as calculated from the following:    Height as of 12/29/24: 1.575 m (5' 2\").    Weight as of 12/29/24: 82.6 kg (182 lb).              Diet: Regular Diet Adult  Room Service  Diet    Prophylaxis: PCD's and ambulation  Ferraro Catheter: Not present  Lines: None     Code Status: No CPR- Do NOT Intubate    Disposition Plan   Medically Ready for Discharge: Ready Now  Expected discharge to home with family pending TCU/rehab/facility bed availability.    Entered: Linus" Jacek Lizama MD 01/06/2025, 11:17 AM       COMMUNICATION  - I discussed with patient's daughter 01/06/25    Interval History   Doing OK.  Pain controlled.    * Data reviewed today: I reviewed all new labs and imaging over the last 24 hours. I personally reviewed no images or ECG's today.    Physical Exam   Most recent vitals:   , Blood pressure 106/65, pulse 57, temperature 98  F (36.7  C), temperature source Oral, resp. rate 16, SpO2 93%. O2 Device: None (Room air)    There were no vitals filed for this visit.  Vital signs with ranges:  Temp:  [97.5  F (36.4  C)-98.6  F (37  C)] 98  F (36.7  C)  Pulse:  [51-64] 57  Resp:  [16-18] 16  BP: (106-135)/(60-69) 106/65  SpO2:  [93 %-98 %] 93 %  Patient Vitals for the past 24 hrs:   BP Temp Temp src Pulse Resp SpO2   01/06/25 0814 106/65 98  F (36.7  C) Oral 57 16 93 %   01/06/25 0642 -- -- -- -- 16 --   01/06/25 0328 135/69 97.6  F (36.4  C) Oral 51 16 98 %   01/06/25 0057 -- -- -- -- 16 --   01/06/25 0007 -- -- -- -- 16 --   01/05/25 2023 119/64 98.6  F (37  C) Oral 58 18 96 %   01/05/25 1821 -- -- -- 64 -- --   01/05/25 1119 115/60 97.5  F (36.4  C) Oral 58 16 95 %     I/O's last 24 hours:  No intake/output data recorded.    Constitutional: awake, alert, oriented, pleasant   Head:   Eyes:   ENT:   Neck:   Cardiovascular: regular rate and rhythm; no murmurs, rubs or gallops  Lungs: diminished in the bases, no crackles or wheezes  Gastrointestinal/Abdomen: soft, non-tender, non-distended, positive bowel sounds  :   Musculoskeletal:   Skin/Extremities:   Neurologic:   Psychiatric:   Hematologic/Lymphatic/Immunologic:        Labs reviewed.  Recent Labs   Lab 01/06/25  0823 01/03/25  0824 01/02/25  1140 01/01/25  0840 12/31/24  1103    185  --   --   --    NA  --   --  134* 131* 137   POTASSIUM  --   --  3.8 4.0 4.4   CHLORIDE  --   --  102 98 101   CO2  --   --  21* 20* 20*   BUN  --   --  26.0* 35.2* 40.2*   CR  --   --  0.99* 1.25* 1.61*   ANIONGAP  --   --  11  "13 16*   ARABELLA  --   --  9.0 9.1 9.8   GLC  --   --  117* 111* 138*     Recent Labs   Lab Test 01/07/23  1241 10/04/18  1550   NT-PROBNP, INPATIENT 1,013 349     Recent Labs   Lab 01/02/25  1140 01/01/25  0840 12/31/24  1103   * 111* 138*     No lab results found.    No results for input(s): \"INR\", \"[id number]\" in the last 168 hours.  No results for input(s): \"WBC\", \"CRP\", \"DD\", \"LDH\", \"FIBR\", \"DEBBI\", \"IL6B\", \"LACT\", \"LACTS\", \"PCAL\", \"[id number]\" in the last 168 hours.      MICRO:  Cultures (including blood and urine):  No lab results found in last 7 days.    No results found for this or any previous visit (from the past 24 hours).    Medications   All medications were reviewed. MAR.    Infusions:  Current Facility-Administered Medications   Medication Dose Route Frequency Provider Last Rate Last Admin     Scheduled Medications:  Current Facility-Administered Medications   Medication Dose Route Frequency Provider Last Rate Last Admin    acetaminophen (TYLENOL) tablet 975 mg  975 mg Oral Q6H Crystal Falcon PA-C   975 mg at 01/06/25 0642    bumetanide (BUMEX) tablet 2 mg  2 mg Oral Daily Crystal Falcon PA-C   2 mg at 01/06/25 0819    carvedilol (COREG) tablet 25 mg  25 mg Oral BID w/meals Crystal Falcon PA-C   25 mg at 01/06/25 0818    guaiFENesin (MUCINEX) 12 hr tablet 600 mg  600 mg Oral BID Crystal Falcon PA-C   600 mg at 01/06/25 0819    heparin ANTICOAGULANT injection 5,000 Units  5,000 Units Subcutaneous Q8H Crystal Falcon PA-C   5,000 Units at 01/06/25 0343    Lidocaine (LIDOCARE) 4 % Patch 2 patch  2 patch Transdermal Q24H Crystal Falcon PA-C   2 patch at 01/06/25 0819    magnesium hydroxide (MILK OF MAGNESIA) suspension 30 mL  30 mL Oral Daily Crystal Falcon PA-C   30 mL at 01/06/25 0819    potassium chloride payton ER (KLOR-CON M10) CR tablet 10 mEq  10 mEq Oral BID Crystal Falcon PA-C   10 mEq at 01/06/25 0819    " pravastatin (PRAVACHOL) tablet 40 mg  40 mg Oral At Bedtime Crystal Falcon PA-C   40 mg at 01/05/25 2027    sennosides (SENOKOT) tablet 2 tablet  2 tablet Oral At Bedtime Crystal Falcon PA-C   2 tablet at 01/05/25 2027    sodium chloride (PF) 0.9% PF flush 3 mL  3 mL Intracatheter Q8H Crystal Falcon PA-C   3 mL at 01/06/25 0822    spironolactone (ALDACTONE) tablet 25 mg  25 mg Oral Daily Crystal Falcon PA-C   25 mg at 01/06/25 0818     PRN Medications:  Current Facility-Administered Medications   Medication Dose Route Frequency Provider Last Rate Last Admin    acetaminophen (TYLENOL) tablet 650 mg  650 mg Oral Q12H PRN Crystal Falcon PA-C        benzonatate (TESSALON) capsule 100 mg  100 mg Oral TID PRN Crystal Falcon PA-C        guaiFENesin (ROBITUSSIN) 20 mg/mL solution 200 mg  200 mg Oral Q4H PRN Crystal Falcon PA-C        methocarbamol (ROBAXIN) tablet 500 mg  500 mg Oral TID PRN Crystal Falcon PA-C        naloxone (NARCAN) injection 0.2 mg  0.2 mg Intravenous Q2 Min PRN Crystal Falcon PA-C        Or    naloxone (NARCAN) injection 0.4 mg  0.4 mg Intravenous Q2 Min PRN Crystal Falcon PA-C        Or    naloxone (NARCAN) injection 0.2 mg  0.2 mg Intramuscular Q2 Min PRN Crystal Falcon PA-C        Or    naloxone (NARCAN) injection 0.4 mg  0.4 mg Intramuscular Q2 Min PRN Crystal Falcon PA-C        ondansetron (ZOFRAN ODT) ODT tab 4 mg  4 mg Oral Q6H PRN Crystal Falcon PA-C        Or    ondansetron (ZOFRAN) injection 4 mg  4 mg Intravenous Q6H PRN Crystal Falcon PA-C        oxyCODONE (ROXICODONE) tablet 5 mg  5 mg Oral Q4H PRN Crystal Falcon PA-C        oxyCODONE IR (ROXICODONE) half-tab 2.5 mg  2.5 mg Oral Q4H PRN Crystal Falcon PA-C        prochlorperazine (COMPAZINE) injection 5 mg  5 mg Intravenous Q6H PRN Crystal Falcon PA-C         Or    prochlorperazine (COMPAZINE) tablet 5 mg  5 mg Oral Q6H PRN Crystal Falcon PA-C        senna-docusate (SENOKOT-S/PERICOLACE) 8.6-50 MG per tablet 1 tablet  1 tablet Oral BID PRN Crystal Falcon PA-C        Or    senna-docusate (SENOKOT-S/PERICOLACE) 8.6-50 MG per tablet 2 tablet  2 tablet Oral BID PRN Crystal Falcon PA-C        sodium chloride (PF) 0.9% PF flush 3 mL  3 mL Intracatheter q1 min prn Crystal Falcon PA-C

## 2025-01-06 NOTE — PROGRESS NOTES
"Care Management Follow Up    Length of Stay (days): 0    Expected Discharge Date: 01/06/2025     Concerns to be Addressed:       Patient plan of care discussed at interdisciplinary rounds: Yes    Anticipated Discharge Disposition:  TCU     Anticipated Discharge Services:    Anticipated Discharge DME:      Patient/family educated on Medicare website which has current facility and service quality ratings:    Education Provided on the Discharge Plan:    Patient/Family in Agreement with the Plan:      Referrals Placed by CM/SW:    Private pay costs discussed:     Discussed  Partnership in Safe Discharge Planning  document with patient/family: No     Handoff Completed: No, handoff not indicated or clinically appropriate    Additional Information:  Received call from daughter Briana wondering about any updates. Informed Briana that the only accepting facility has been [facility] and  insurance auth has been submitted. Briana asked if there's anything that can be done to expedite the process. Informed Briana things are going as quick as they're able, but she's welcome to call insurance herself if she'd like.    Addendum 1021: received call from [employer] informing of request for peer to peer for TCU authorization. Ph: [phone] option 5. It is due at noon today. Updated Dr. Lizama.    Addendum 1130: informed by Dr. Lizama that [facility] declined the insurance authorization. Updated daughter Briana on phone, who was upset and told SW they want them to \"listen and learn from this\" to which CARLOTTA stated that insurance's decision has nothing to do with SW. Informed Briana that Dr. Lizama completed a peer to peer review with [employer] and [facility] clinical team denied the request. Briana asked for numbers of administrators she can call. Provided with Patient Relations phone number, as well as the business office for any financial questions. Briana states that she needs to talk with her brother and then will let SW know their next steps. " "    Addendum 1330: Spoke with Briana and pt's son at bedside. Provided with phone number to contact [employer], which they plan to do. They also wanted R[facility] (admissions at [facility]) phone number, which CARLOTTA provided. Family wanted to know if we needed to get another PT note in that supports TCU so that insurance approves it. Informed family that PT documents exactly how their sessions go, and if insurance doesn't approve it, insurance doesn't feel there's a need for TCU then. Discussed that some insurances are much pickier than others, especially [employer]. SW clarified again that the hospital is recommending TCU but insurance is the one denying at this time, which has nothing to do with PT, the doctor, or SW.     Addendum 1523: Son knocked on SW office and asked for unit fax number, states \"we're getting somewhere.\" Provided to son.    Genevieve Guzman, \[facility] [place] [facility]\""  Social Work  [facility]       "

## 2025-01-07 ENCOUNTER — APPOINTMENT (OUTPATIENT)
Dept: OCCUPATIONAL THERAPY | Facility: CLINIC | Age: OVER 89
End: 2025-01-07
Attending: HOSPITALIST
Payer: COMMERCIAL

## 2025-01-07 ENCOUNTER — APPOINTMENT (OUTPATIENT)
Dept: PHYSICAL THERAPY | Facility: CLINIC | Age: OVER 89
End: 2025-01-07
Attending: HOSPITALIST
Payer: COMMERCIAL

## 2025-01-07 PROCEDURE — 97116 GAIT TRAINING THERAPY: CPT | Mod: GP

## 2025-01-07 PROCEDURE — 97535 SELF CARE MNGMENT TRAINING: CPT | Mod: GO

## 2025-01-07 PROCEDURE — 97110 THERAPEUTIC EXERCISES: CPT | Mod: GP

## 2025-01-07 PROCEDURE — 250N000011 HC RX IP 250 OP 636: Performed by: PHYSICIAN ASSISTANT

## 2025-01-07 PROCEDURE — 99232 SBSQ HOSP IP/OBS MODERATE 35: CPT | Performed by: INTERNAL MEDICINE

## 2025-01-07 PROCEDURE — 101N000002 HC CUSTODIAL CARE DAILY

## 2025-01-07 PROCEDURE — 96372 THER/PROPH/DIAG INJ SC/IM: CPT | Performed by: PHYSICIAN ASSISTANT

## 2025-01-07 PROCEDURE — 250N000013 HC RX MED GY IP 250 OP 250 PS 637: Performed by: PHYSICIAN ASSISTANT

## 2025-01-07 RX ADMIN — HEPARIN SODIUM 5000 UNITS: 5000 INJECTION, SOLUTION INTRAVENOUS; SUBCUTANEOUS at 20:56

## 2025-01-07 RX ADMIN — CARVEDILOL 25 MG: 25 TABLET, FILM COATED ORAL at 09:37

## 2025-01-07 RX ADMIN — ACETAMINOPHEN 975 MG: 325 TABLET, FILM COATED ORAL at 15:46

## 2025-01-07 RX ADMIN — HEPARIN SODIUM 5000 UNITS: 5000 INJECTION, SOLUTION INTRAVENOUS; SUBCUTANEOUS at 03:58

## 2025-01-07 RX ADMIN — GUAIFENESIN 600 MG: 600 TABLET, EXTENDED RELEASE ORAL at 09:36

## 2025-01-07 RX ADMIN — PRAVASTATIN SODIUM 40 MG: 40 TABLET ORAL at 20:55

## 2025-01-07 RX ADMIN — LIDOCAINE 2 PATCH: 4 PATCH TOPICAL at 09:37

## 2025-01-07 RX ADMIN — POTASSIUM CHLORIDE 10 MEQ: 750 TABLET, EXTENDED RELEASE ORAL at 09:37

## 2025-01-07 RX ADMIN — GUAIFENESIN 600 MG: 600 TABLET, EXTENDED RELEASE ORAL at 20:55

## 2025-01-07 RX ADMIN — HEPARIN SODIUM 5000 UNITS: 5000 INJECTION, SOLUTION INTRAVENOUS; SUBCUTANEOUS at 12:31

## 2025-01-07 RX ADMIN — POTASSIUM CHLORIDE 10 MEQ: 750 TABLET, EXTENDED RELEASE ORAL at 12:31

## 2025-01-07 RX ADMIN — SPIRONOLACTONE 25 MG: 25 TABLET ORAL at 09:36

## 2025-01-07 RX ADMIN — ACETAMINOPHEN 975 MG: 325 TABLET, FILM COATED ORAL at 20:54

## 2025-01-07 RX ADMIN — ACETAMINOPHEN 650 MG: 325 TABLET, FILM COATED ORAL at 09:36

## 2025-01-07 RX ADMIN — SENNOSIDES 2 TABLET: 8.6 TABLET, FILM COATED ORAL at 20:56

## 2025-01-07 RX ADMIN — ACETAMINOPHEN 975 MG: 325 TABLET, FILM COATED ORAL at 06:52

## 2025-01-07 RX ADMIN — MAGNESIUM HYDROXIDE 30 ML: 400 SUSPENSION ORAL at 09:38

## 2025-01-07 RX ADMIN — BUMETANIDE 2 MG: 1 TABLET ORAL at 09:37

## 2025-01-07 ASSESSMENT — ACTIVITIES OF DAILY LIVING (ADL)
ADLS_ACUITY_SCORE: 69
ADLS_ACUITY_SCORE: 67
ADLS_ACUITY_SCORE: 66
ADLS_ACUITY_SCORE: 68
ADLS_ACUITY_SCORE: 69
ADLS_ACUITY_SCORE: 66
ADLS_ACUITY_SCORE: 69
ADLS_ACUITY_SCORE: 66
ADLS_ACUITY_SCORE: 66
ADLS_ACUITY_SCORE: 69
ADLS_ACUITY_SCORE: 66
ADLS_ACUITY_SCORE: 69
ADLS_ACUITY_SCORE: 69
ADLS_ACUITY_SCORE: 66

## 2025-01-07 NOTE — PROGRESS NOTES
"[facility]    Internal Medicine Hospitalist Progress Note  01/07/2025  I evaluated patient on the above date.    Linus Lizama Jr., MD  [phone] (p)  Text Page  Vocera      [New actions/orders today (01/07/2025) are underlined in the assessment and plan. All lab results in the assessment and plan were reviewed.]  Assessment & Plan   Deborah \"Tammi\" Neftaly is a 89-year-old female with past medical history significant for hypertension; hyperlipidemia; and CKD stage III; who lives at assisted facility; who presented to [facility] on 12/29/2024 with chief complaint of fall likely mechanical and was diagnosed with multiple rib fractures after she complained of left-sided chest pain. She was admitted to [facility] for pulmonary hygiene, respiratory monitoring and pain control. She was medically stabilized with subsequent plan for TCU. Transitioned to [facility] care status 1/3/2024.    On initial evaluation 12/29, was afebrile, hypertensive, tachypneic, sats in 90's. Labs notable for CBC normal; BMP with BUN 26.3, cr 1.19.    CXR showed acute mildly displaced fractures of the anterior left fifth, sixth, seventh, eighth, and ninth ribs; question trace left pleural fluid; no significant pneumothorax.     CT chest w/o contrast showed subtle nondisplaced fractures involving the left fourth and left 10th ribs; several old bilateral rib fractures and old sternal fracture; scattered fibrotic changes and subsegmental atelectasis both lungs, otherwise no acute pulmonary disease.       Left 4th and 10th rib fractures (non-displaced) secondary to mechanical fall.  Chest congestion.  * Initial presentation as above. Admitted under observation and started on analgesics, respiratory cares.  * 1/6: Pain controlled. Despite recommendations from therapies, [employer] insurance denied insurance coverage for TCU. PT and OT reconsulted. PT and OT recommended TCU.  - Continue acetaminophen 975 mg " "q6h; lidocaine patch 4%, 2 patches daily; PRN acetaminophen, PRN methocarbamol, PRN oxycodone, minimize opioids as able.  - Continue guaifenesin 600 mg BID; PRN guaifenesin, PRN benzonatate.   - Continue PT and OT - planned TCU, but insurance denied coverage despite recommendations from PT and OT and pt/family appealing insurance decision.  - Continue to encourage deep breaths, activity as tolerated and incentive spirometry.    Acute kidney injury on CKD stage 3, suspect prerenal.  * Baseline creatinine is around 1.2-1.3.  * Cr 1.19 on admit.  * Cr up to 1.61 on 12/31/2024.  * Given IVF's. PTA bumetanide and spironolactone held, restarted 1/3.  Recent Labs   Lab 01/02/25  1140 01/01/25  0840 12/31/24  1103   CR 0.99* 1.25* 1.61*   Estimated Creatinine Clearance: 35.4 mL/min (A) (based on SCr of 0.99 mg/dL (H)).  - Continue to monitor BMP outpatient.  - Avoid nephrotoxic medications.    Hyponatremia, suspect nutritional/hypovolemia.  * Sodium normal on admit.   * Sodium 131 on 1/1.  * Given IVF's this hospitalization.  * Sodium 134 on 1/2.     Hypertension (benign essential).  Peripheral edema.  [PTA: bumetanide 2 mg daily; carvedilol 25 mg BID; spironolactone 25 mg daily.]  * PTA bumetanide and spironolactone held for NICK, restarted 1/3.  - Continue bumetanide, carvedilol, and spironolactone.    Hyperlipidemia/dyslipidemia.  - Continue pravastatin.      Clinically Significant Risk Factors Present on Admission                   # Hypertension: Noted on problem list           # Obesity: Estimated body mass index is 33.29 kg/m  as calculated from the following:    Height as of 12/29/24: 1.575 m (5' 2\").    Weight as of 12/29/24: 82.6 kg (182 lb).              Diet: Regular Diet Adult  Room Service  Diet    Prophylaxis: PCD's and ambulation  Ferraro Catheter: Not present  Lines: None     Code Status: No CPR- Do NOT Intubate    Disposition Plan   Medically Ready for Discharge: Ready Now  Expected discharge to home with " family pending TCU/rehab/facility bed availability.    Entered: Linus Lizama MD 01/07/2025, 10:07 AM       COMMUNICATION  - I discussed with patient's daughter 01/06/25    Interval History   No acute events overnight.  Tired, otherwise feeling OK.    * Data reviewed today: I reviewed all new labs and imaging over the last 24 hours. I personally reviewed no images or ECG's today.    Physical Exam   Most recent vitals:   , Blood pressure 135/68, pulse 59, temperature 97.3  F (36.3  C), temperature source Oral, resp. rate 16, SpO2 98%. O2 Device: None (Room air)    There were no vitals filed for this visit.  Vital signs with ranges:  Temp:  [97.2  F (36.2  C)-97.3  F (36.3  C)] 97.3  F (36.3  C)  Pulse:  [56-63] 59  Resp:  [16] 16  BP: (118-135)/(63-70) 135/68  SpO2:  [94 %-98 %] 98 %  Patient Vitals for the past 24 hrs:   BP Temp Temp src Pulse Resp SpO2   01/07/25 0907 135/68 97.3  F (36.3  C) Oral 59 16 98 %   01/06/25 2340 128/70 97.3  F (36.3  C) Oral 57 16 97 %   01/06/25 1816 118/63 97.2  F (36.2  C) Axillary 56 16 94 %   01/06/25 1205 125/63 97.2  F (36.2  C) Oral 63 16 95 %     I/O's last 24 hours:  I/O last 3 completed shifts:  In: 540 [P.O.:540]  Out: -     Constitutional: awake, alert, oriented, pleasant, fatigued appearing   Head:   Eyes:   ENT:   Neck:   Cardiovascular: regular rate and rhythm; no murmurs, rubs or gallops  Lungs: diminished in the bases, no crackles or wheezes  Gastrointestinal/Abdomen: soft, non-tender, non-distended, positive bowel sounds  :   Musculoskeletal:   Skin/Extremities:   Neurologic:   Psychiatric:   Hematologic/Lymphatic/Immunologic:        Labs reviewed.  Recent Labs   Lab 01/06/25  0823 01/03/25  0824 01/02/25  1140 01/01/25  0840 12/31/24  1103    185  --   --   --    NA  --   --  134* 131* 137   POTASSIUM  --   --  3.8 4.0 4.4   CHLORIDE  --   --  102 98 101   CO2  --   --  21* 20* 20*   BUN  --   --  26.0* 35.2* 40.2*   CR  --   --  0.99* 1.25* 1.61*  "  ANIONGAP  --   --  11 13 16*   ARABELLA  --   --  9.0 9.1 9.8   GLC  --   --  117* 111* 138*     Recent Labs   Lab Test 01/07/23  1241 10/04/18  1550   NT-PROBNP, INPATIENT 1,013 349     Recent Labs   Lab 01/02/25  1140 01/01/25  0840 12/31/24  1103   * 111* 138*     No lab results found.    No results for input(s): \"INR\", \"[id number]\" in the last 168 hours.  No results for input(s): \"WBC\", \"CRP\", \"DD\", \"LDH\", \"FIBR\", \"DEBBI\", \"IL6B\", \"LACT\", \"LACTS\", \"PCAL\", \"[id number]\" in the last 168 hours.      MICRO:  Cultures (including blood and urine):  No lab results found in last 7 days.    No results found for this or any previous visit (from the past 24 hours).    Medications   All medications were reviewed. MAR.    Infusions:  Current Facility-Administered Medications   Medication Dose Route Frequency Provider Last Rate Last Admin     Scheduled Medications:  Current Facility-Administered Medications   Medication Dose Route Frequency Provider Last Rate Last Admin    acetaminophen (TYLENOL) tablet 975 mg  975 mg Oral Q6H Crystal Falcon PA-C   975 mg at 01/07/25 0652    bumetanide (BUMEX) tablet 2 mg  2 mg Oral Daily Crystal Falcon PA-C   2 mg at 01/07/25 0937    carvedilol (COREG) tablet 25 mg  25 mg Oral BID w/meals Crystal Falcon PA-C   25 mg at 01/07/25 0937    guaiFENesin (MUCINEX) 12 hr tablet 600 mg  600 mg Oral BID Crystal Falcon PA-C   600 mg at 01/07/25 0936    heparin ANTICOAGULANT injection 5,000 Units  5,000 Units Subcutaneous Q8H Crystal Falcon PA-C   5,000 Units at 01/07/25 0358    Lidocaine (LIDOCARE) 4 % Patch 2 patch  2 patch Transdermal Q24H Crystal Falcon PA-C   2 patch at 01/07/25 0937    magnesium hydroxide (MILK OF MAGNESIA) suspension 30 mL  30 mL Oral Daily Crystal Falcon PA-C   30 mL at 01/07/25 0938    potassium chloride payton ER (KLOR-CON M10) CR tablet 10 mEq  10 mEq Oral BID Crystal Falcon PA-C   10 mEq " at 01/07/25 0937    pravastatin (PRAVACHOL) tablet 40 mg  40 mg Oral At Bedtime Crystal Falcon PA-C   40 mg at 01/06/25 2100    sennosides (SENOKOT) tablet 2 tablet  2 tablet Oral At Bedtime Crystal Falcon PA-C   2 tablet at 01/06/25 2100    sodium chloride (PF) 0.9% PF flush 3 mL  3 mL Intracatheter Q8H Crystal Falcon PA-C   3 mL at 01/07/25 0938    spironolactone (ALDACTONE) tablet 25 mg  25 mg Oral Daily Crystal Falcon PA-C   25 mg at 01/07/25 0936     PRN Medications:  Current Facility-Administered Medications   Medication Dose Route Frequency Provider Last Rate Last Admin    acetaminophen (TYLENOL) tablet 650 mg  650 mg Oral Q12H PRN Crystal Falcon PA-C   650 mg at 01/07/25 0936    benzonatate (TESSALON) capsule 100 mg  100 mg Oral TID PRN Crystal Falcon PA-C        guaiFENesin (ROBITUSSIN) 20 mg/mL solution 200 mg  200 mg Oral Q4H PRN Crystal Falcon PA-C        methocarbamol (ROBAXIN) tablet 500 mg  500 mg Oral TID PRN Crystal Falcon PA-C        naloxone (NARCAN) injection 0.2 mg  0.2 mg Intravenous Q2 Min PRN Crystal Falcon PA-C        Or    naloxone (NARCAN) injection 0.4 mg  0.4 mg Intravenous Q2 Min PRN Crystal Falcon PA-C        Or    naloxone (NARCAN) injection 0.2 mg  0.2 mg Intramuscular Q2 Min PRN Crystal Falcon PA-C        Or    naloxone (NARCAN) injection 0.4 mg  0.4 mg Intramuscular Q2 Min PRN Crystal Falcon PA-C        ondansetron (ZOFRAN ODT) ODT tab 4 mg  4 mg Oral Q6H PRN Crystal Falcon PA-C        Or    ondansetron (ZOFRAN) injection 4 mg  4 mg Intravenous Q6H PRN Crystal Falcon PA-C        oxyCODONE (ROXICODONE) tablet 5 mg  5 mg Oral Q4H PRN Crystal Falcon PA-C        oxyCODONE IR (ROXICODONE) half-tab 2.5 mg  2.5 mg Oral Q4H PRN Crystal Falcon PA-C        prochlorperazine (COMPAZINE) injection 5 mg  5 mg Intravenous  Q6H PRN Crystal Falcon PA-C        Or    prochlorperazine (COMPAZINE) tablet 5 mg  5 mg Oral Q6H PRN Crystal Falcon PA-C        senna-docusate (SENOKOT-S/PERICOLACE) 8.6-50 MG per tablet 1 tablet  1 tablet Oral BID PRN Crystal Falcon PA-C        Or    senna-docusate (SENOKOT-S/PERICOLACE) 8.6-50 MG per tablet 2 tablet  2 tablet Oral BID PRN Crystal Falcon PA-C        sodium chloride (PF) 0.9% PF flush 3 mL  3 mL Intracatheter q1 min prn Crystal Falcon PA-C

## 2025-01-07 NOTE — PROGRESS NOTES
[id number]  PRIMARY Concern: fall, L 4th - 10th rib fx  SAFETY RISK Concerns (fall risk, behaviors, etc.): fall risk  Aggression Tool Color: Green  Isolation/Type: n/a  Tests/Procedures for NEXT shift: n/a  Consults? (Pending/following, signed-off?) Thoracic surgery signed off; PT rec TCU. SW following for placement.  Where is patient from? (Home, TCU, etc.): [facility]  Other Important info for NEXT shift: Intermittent cough. Started on mucinex. Encouraged IS use.  Anticipated DC date & active delays: TBD. TCU placement vs Home with HHC. Pt flipped to [facility] care. Family refusing in network TCU's. Considering d/c'ing home with hired help and HHC.- Plan for Monday 1/6/24 re-eval of TCU availability-     SUMMARY NOTE:  Orientation/Cognitive: AOX4, forgetful, [place].   Observation Goals (Met/ Not Met): care home care  Mobility Level/Assist Equipment: Ax1 gb/W  Antibiotics & Plan (IV/po, length of tx left): n/a  Pain Management: Scheduled tylenol, lido patches. Pt stated pain much better; only notices it with coughing.  Tele/VS/O2: VSS on RA  ABNL Lab/BG: Na 134.   Bowel/Bladder: occasionally incont urine, voiding well. Ambulates to bathroom. No BM this shift. LBM 1/4. Continue with stool softeners.   Skin Concerns: scattered bruises/edema BLEs, redness to coccyx. Compression stockings  Drains/Devices: PIV SL.   Patient Stated Goal for Today: sleep

## 2025-01-07 NOTE — PROGRESS NOTES
23:00-07:30  PRIMARY Concern: fall, L 4th - 10th rib fx  SAFETY RISK Concerns (fall risk, behaviors, etc.): fall risk  Aggression Tool Color: Green  Isolation/Type: n/a  Tests/Procedures for NEXT shift: n/a  Consults? (Pending/following, signed-off?) Thoracic surgery signed off; PT rec TCU. SW following for placement.  Where is patient from? (Home, TCU, etc.): [facility]  Other Important info for NEXT shift: Intermittent cough. Started on mucinex. Encouraged IS use.  Anticipated DC date & active delays: TBD. TCU placement vs Home with HHC. Pt flipped to care home care. Family refusing in network TCU's. Considering d/c'ing home with hired help and HHC.- Pending TCU availability-     SUMMARY NOTE:  Orientation/Cognitive: AOX4, forgetful, [place].   Observation Goals (Met/ Not Met): shelter care  Mobility Level/Assist Equipment: Ax1 gb/W  Antibiotics & Plan (IV/po, length of tx left): n/a  Pain Management: Scheduled tylenol given x2 for L rib pain which she stated helps  Tele/VS/O2: VSS on RA  ABNL Lab/BG: Na 134.   Bowel/Bladder: occasionally incont urine, voiding well. Ambulates to bathroom. No BM this shift. LBM 1/4  Skin Concerns: scattered bruises/edema BLEs, redness to coccyx. Compression stockings  Drains/Devices: PIV SL.   Patient Stated Goal for Today: Get discharged

## 2025-01-07 NOTE — PROGRESS NOTES
Care Management Follow Up    Length of Stay (days): 0    Expected Discharge Date: 01/07/2025     Concerns to be Addressed:       Patient plan of care discussed at interdisciplinary rounds: Yes    Anticipated Discharge Disposition:  Home care     Anticipated Discharge Services:    Anticipated Discharge DME:      Patient/family educated on Medicare website which has current facility and service quality ratings:    Education Provided on the Discharge Plan:    Patient/Family in Agreement with the Plan:      Referrals Placed by CM/SW:    Private pay costs discussed: Not applicable    Discussed  Partnership in Safe Discharge Planning  document with patient/family: No     Handoff Completed: No, handoff not indicated or clinically appropriate    Additional Information:  Received call from daughter Briana stating they are waiting on an answer from [employer] after they've appealed the denial. Briana asked for SW to update them if they hear anything.  Spoke with Reta from [facility]. Reta state she talked with family yesterday and reiterated what SW has told them as well, that this denial was from [facility]'s request (that continued to be processed, even after family wanted to retract the auth) so they are appealing this authorization. Reta states the family and herself have made [employer] aware that if pt were to get approved, they would want it to be for [facility], but this cannot be guaranteed. Reta states that [facility] likely declined this auth because they feel pt is just needing more help at home and it's not far off from pt's baseline. Reta is concerned that even if [facility] does authorize it, it would likely only be for a few days. Reta states she asked [employer] how long it may take to get a decision and they couldn't answer this. Reta will let CARLOTTA know if she hears anything. Reta states she is willing to submit more notes to them to get the approval for [facility], if  needed.    Addendum 1350: received call from [facility] requesting more of pt's medical records be faxed to them at [phone]. Faxed hospitalist progress notes, nursing notes, and recent PT/OT eval to [facility].    Addendum 1606: Spoke with daughter Briana, updated that CARLOTTA faxed [facility] extra notes today and haven't heard anything else. Briana asked questions related to timelines that CARLOTTA couldn't answer, as [employer] is who we are waiting on at this point.     Genevieve Guzman, JESSIE  Social Work  [facility]

## 2025-01-07 NOTE — PLAN OF CARE
Goal Outcome Evaluation:                 1/7/2025 [id number]  PRIMARY Concern: fall, L 4th - 10th rib fx  SAFETY RISK Concerns (fall risk, behaviors, etc.): fall risk  Aggression Tool Color: Green  Isolation/Type: n/a  Tests/Procedures for NEXT shift: n/a  Consults? (Pending/following, signed-off?) Thoracic surgery signed off; PT rec TCU. SW following for placement.  Where is patient from? (Home, TCU, etc.): [facility]  Other Important info for NEXT shift: Intermittent cough. Started on mucinex. Congested and non productive. Encouraged IS use.  Anticipated DC date & active delays: TBD. TCU placement vs Home with HHC. Pt flipped to group home care. Family refusing in network TCU's. Considering d/c'ing home with hired help and HHC.- Pending TCU availability-     SUMMARY NOTE:  Orientation/Cognitive: AOX4, forgetful, [place].   Observation Goals (Met/ Not Met): [facility] care  Mobility Level/Assist Equipment: Ax1 gb/W  Antibiotics & Plan (IV/po, length of tx left): n/a  Pain Management: Scheduled tylenol given x2 for L rib pain. Lidocaine patches applied. Pt did not want robaxin or oxycodone this shift.  Tele/VS/O2: VSS on RA  ABNL Lab/BG: Na 134.   Bowel/Bladder: occasionally incont urine, voiding well. Ambulates to bathroom. No BM this shift. LBM 1/4  Skin Concerns: scattered bruises/edema BLEs, redness to coccyx. Large RLQ hernia.  Drains/Devices: R PIV SL.   Patient Stated Goal for Today: Get discharged, wants to take a shower.

## 2025-01-08 PROCEDURE — 101N000002 HC CUSTODIAL CARE DAILY

## 2025-01-08 PROCEDURE — 250N000011 HC RX IP 250 OP 636: Performed by: PHYSICIAN ASSISTANT

## 2025-01-08 PROCEDURE — 99232 SBSQ HOSP IP/OBS MODERATE 35: CPT | Performed by: INTERNAL MEDICINE

## 2025-01-08 PROCEDURE — 96372 THER/PROPH/DIAG INJ SC/IM: CPT | Performed by: PHYSICIAN ASSISTANT

## 2025-01-08 PROCEDURE — 250N000013 HC RX MED GY IP 250 OP 250 PS 637: Performed by: PHYSICIAN ASSISTANT

## 2025-01-08 RX ADMIN — ACETAMINOPHEN 975 MG: 325 TABLET, FILM COATED ORAL at 20:48

## 2025-01-08 RX ADMIN — GUAIFENESIN 600 MG: 600 TABLET, EXTENDED RELEASE ORAL at 20:48

## 2025-01-08 RX ADMIN — HEPARIN SODIUM 5000 UNITS: 5000 INJECTION, SOLUTION INTRAVENOUS; SUBCUTANEOUS at 20:48

## 2025-01-08 RX ADMIN — SENNOSIDES 2 TABLET: 8.6 TABLET, FILM COATED ORAL at 20:48

## 2025-01-08 RX ADMIN — HEPARIN SODIUM 5000 UNITS: 5000 INJECTION, SOLUTION INTRAVENOUS; SUBCUTANEOUS at 12:04

## 2025-01-08 RX ADMIN — POTASSIUM CHLORIDE 10 MEQ: 750 TABLET, EXTENDED RELEASE ORAL at 08:23

## 2025-01-08 RX ADMIN — ACETAMINOPHEN 325 MG: 325 TABLET, FILM COATED ORAL at 02:07

## 2025-01-08 RX ADMIN — BUMETANIDE 2 MG: 1 TABLET ORAL at 08:23

## 2025-01-08 RX ADMIN — ACETAMINOPHEN 650 MG: 325 TABLET, FILM COATED ORAL at 14:32

## 2025-01-08 RX ADMIN — ACETAMINOPHEN 975 MG: 325 TABLET, FILM COATED ORAL at 08:22

## 2025-01-08 RX ADMIN — SPIRONOLACTONE 25 MG: 25 TABLET ORAL at 08:23

## 2025-01-08 RX ADMIN — PRAVASTATIN SODIUM 40 MG: 40 TABLET ORAL at 20:48

## 2025-01-08 RX ADMIN — HEPARIN SODIUM 5000 UNITS: 5000 INJECTION, SOLUTION INTRAVENOUS; SUBCUTANEOUS at 03:51

## 2025-01-08 RX ADMIN — CARVEDILOL 25 MG: 25 TABLET, FILM COATED ORAL at 08:23

## 2025-01-08 RX ADMIN — GUAIFENESIN 600 MG: 600 TABLET, EXTENDED RELEASE ORAL at 08:23

## 2025-01-08 RX ADMIN — LIDOCAINE 2 PATCH: 4 PATCH TOPICAL at 12:03

## 2025-01-08 RX ADMIN — POTASSIUM CHLORIDE 10 MEQ: 750 TABLET, EXTENDED RELEASE ORAL at 12:04

## 2025-01-08 ASSESSMENT — ACTIVITIES OF DAILY LIVING (ADL)
ADLS_ACUITY_SCORE: 69
ADLS_ACUITY_SCORE: 66
ADLS_ACUITY_SCORE: 69
ADLS_ACUITY_SCORE: 69
ADLS_ACUITY_SCORE: 66
ADLS_ACUITY_SCORE: 66
ADLS_ACUITY_SCORE: 69
ADLS_ACUITY_SCORE: 69
ADLS_ACUITY_SCORE: 66
ADLS_ACUITY_SCORE: 66
ADLS_ACUITY_SCORE: 69
ADLS_ACUITY_SCORE: 66
ADLS_ACUITY_SCORE: 66
ADLS_ACUITY_SCORE: 69
ADLS_ACUITY_SCORE: 66
ADLS_ACUITY_SCORE: 69
ADLS_ACUITY_SCORE: 66

## 2025-01-08 NOTE — PLAN OF CARE
Goal Outcome Evaluation:    PRIMARY Concern: fall, L 4th - 10th rib fx  SAFETY RISK Concerns (fall risk, behaviors, etc.): fall risk  Aggression Tool Color: Green  Isolation/Type: n/a  Tests/Procedures for NEXT shift: n/a  Consults? (Pending/following, signed-off?) Thoracic surgery signed off; PT rec TCU. SW following for placement.  Where is patient from? (Home, TCU, etc.): [facility]  Other Important info for NEXT shift: Intermittent cough  Anticipated DC date & active delays: TBD. TCU placement vs Home with HHC. Pt flipped to [facility] care. Family refusing in network TCU's. Considering d/c'ing home with hired help and HHC.- Pending TCU availability-     SUMMARY NOTE:  Orientation/Cognitive: AOX4, forgetful, [place].   Observation Goals (Met/ Not Met): halfway care  Mobility Level/Assist Equipment: A1 GBW  Antibiotics & Plan (IV/po, length of tx left): N/A  Pain Management: Scheduled tylenol Tele/VS/O2: VSS on RA  ABNL Lab/BG: N/A  Bowel/Bladder: occasionally incont urine, voiding well. Ambulates to bathroom. No BM this shift.   Skin Concerns: scattered bruises/edema BLEs, redness to coccyx. Large RLQ hernia.  Drains/Devices: R PIV SL.   Patient Stated Goal for Today:

## 2025-01-08 NOTE — PLAN OF CARE
PRIMARY Concern: fall, L 4th and 10th rib fx  SAFETY RISK Concerns (fall risk, behaviors, etc.): fall risk  Aggression Tool Color: Green  Isolation/Type: n/a  Tests/Procedures for NEXT shift: n/a  Consults? (Pending/following, signed-off?) Thoracic surgery signed off; PT rec TCU. SW following for placement.  Where is patient from? (Home, TCU, etc.): [facility]  Other Important info for NEXT shift:   Anticipated DC date & active delays: TBD, pending insurance autho     SUMMARY NOTE:  Orientation/Cognitive: A/O x4, forgetful, [place].   Observation Goals (Met/ Not Met): residential care  Mobility Level/Assist Equipment: A1 GB/W  Antibiotics & Plan (IV/po, length of tx left): N/A  Pain Management: Scheduled tylenol, denies pain  Tele/VS/O2: VSS on RA  ABNL Lab/BG: No recent labs  Bowel/Bladder: Incont at times, up to bathroom    Skin Concerns: scattered bruises/edema BLEs, redness to coccyx. Large RLQ Abd  hernia.  Drains/Devices: PIV SL  Patient Stated Goal for Today: Rest

## 2025-01-08 NOTE — PROGRESS NOTES
Care Management Follow Up    Length of Stay (days): 0    Expected Discharge Date: 01/09/2025     Concerns to be Addressed:       Patient plan of care discussed at interdisciplinary rounds: Yes    Anticipated Discharge Disposition:                Anticipated Discharge Services:    Anticipated Discharge DME:      Patient/family educated on Medicare website which has current facility and service quality ratings:    Education Provided on the Discharge Plan:    Patient/Family in Agreement with the Plan:      Referrals Placed by CM/SW:    Private pay costs discussed: Not applicable    Discussed  Partnership in Safe Discharge Planning  document with patient/family: No     Handoff Completed: No, handoff not indicated or clinically appropriate    Additional Information:  CARLOTTA received a call from [employer] regarding patients appeal indicating they need patients initial PT/OT evaluation. Faxed to [phone]. CARLOTTA faxed requested documents. CARLOTTA called back at [phone] option 2 extension [phone] and inquired if anything else was needed and timeline of determination. CARLOTTA informed they will likely have an answer by tomorrow and the patient will be notified directly. CARLOTTA informed she can call [phone] for status updates.     CARLOTTA received call from gabriella Warren inquiring if Sw has heard anything from [employer]. CARLTOTA informed her that [employer] did call requesting additional notes and these were faxed over to them.     Addendum: CARLOTTA met with Daughter Briana who indicated she spoke with [employer] and tried to expedite and answer and was informed they are not able to expedite it and an answer will be received tomorrow. Briana discussed if [employer] does not approve they will have to make a plan for at home work and would like home care. CARLOTTA explained a home care plan has been started as a back up as well. CARLOTTA informed they would prefer [facility] if possible. CARLOTTA updated [facility].    Next Steps: [employer] appeal.    JESSIE Alvarado    [facility]  [facility]

## 2025-01-08 NOTE — PROGRESS NOTES
"[facility]    Internal Medicine Hospitalist Progress Note  01/08/2025  I evaluated patient on the above date.    Linus Lizama Jr., MD  [phone] (p)  Text Page  Vocera      [New actions/orders today (01/08/2025) are underlined in the assessment and plan. All lab results in the assessment and plan were reviewed.]  Assessment & Plan   Deborah \"Tammi\" Neftaly is a 89-year-old female with past medical history significant for hypertension; hyperlipidemia; and CKD stage III; who lives at assisted facility; who presented to [facility] on 12/29/2024 with chief complaint of fall likely mechanical and was diagnosed with multiple rib fractures after she complained of left-sided chest pain. She was admitted to [facility] for pulmonary hygiene, respiratory monitoring and pain control. She was medically stabilized with subsequent plan for TCU. Transitioned to residential care status 1/3/2024.    On initial evaluation 12/29, was afebrile, hypertensive, tachypneic, sats in 90's. Labs notable for CBC normal; BMP with BUN 26.3, cr 1.19.    CXR showed acute mildly displaced fractures of the anterior left fifth, sixth, seventh, eighth, and ninth ribs; question trace left pleural fluid; no significant pneumothorax.     CT chest w/o contrast showed subtle nondisplaced fractures involving the left fourth and left 10th ribs; several old bilateral rib fractures and old sternal fracture; scattered fibrotic changes and subsegmental atelectasis both lungs, otherwise no acute pulmonary disease.       Left 4th and 10th rib fractures (non-displaced) secondary to mechanical fall.  Chest congestion.  * Initial presentation as above. Admitted under observation and started on analgesics, respiratory cares.  * 1/6: Pain controlled. Despite recommendations from therapies, [employer] insurance denied insurance coverage for TCU. PT and OT reconsulted. PT and OT recommended TCU. Pt/family appealed [employer] " "decision.  - Continue acetaminophen 975 mg q6h; lidocaine patch 4%, 2 patches daily; PRN acetaminophen, PRN methocarbamol, PRN oxycodone, minimize opioids as able.  - Continue guaifenesin 600 mg BID; PRN guaifenesin, PRN benzonatate.   - Continue PT and OT - planned TCU, but insurance denied coverage despite recommendations from PT and OT and pt/family appealing insurance decision   - Continue to encourage deep breaths, activity as tolerated and incentive spirometry.    Acute kidney injury on CKD stage 3, suspect prerenal.  * Baseline creatinine is around 1.2-1.3.  * Cr 1.19 on admit.  * Cr up to 1.61 on 12/31/2024.  * Given IVF's. PTA bumetanide and spironolactone held, restarted 1/3.  Recent Labs   Lab 01/02/25  1140   CR 0.99*   Estimated Creatinine Clearance: 35.4 mL/min (A) (based on SCr of 0.99 mg/dL (H)).  - Continue to monitor BMP outpatient.  - Avoid nephrotoxic medications.    Hyponatremia, suspect nutritional/hypovolemia.  * Sodium normal on admit.   * Sodium 131 on 1/1.  * Given IVF's this hospitalization.  * Sodium 134 on 1/2.     Hypertension (benign essential).  Peripheral edema.  [PTA: bumetanide 2 mg daily; carvedilol 25 mg BID; spironolactone 25 mg daily.]  * PTA bumetanide and spironolactone held for NICK, restarted 1/3.  - Continue bumetanide, carvedilol, and spironolactone.    Hyperlipidemia/dyslipidemia.  - Continue pravastatin.      Clinically Significant Risk Factors Present on Admission                   # Hypertension: Noted on problem list           # Obesity: Estimated body mass index is 33.29 kg/m  as calculated from the following:    Height as of 12/29/24: 1.575 m (5' 2\").    Weight as of 12/29/24: 82.6 kg (182 lb).              Diet: Regular Diet Adult  Room Service  Diet    Prophylaxis: PCD's and ambulation  Ferraro Catheter: Not present  Lines: None     Code Status: No CPR- Do NOT Intubate    Disposition Plan   Medically Ready for Discharge: Ready Now  Expected discharge to home with " family pending TCU/rehab/facility bed availability; pt/family appealing insurance denial to cover TCU stay.    Entered: Linus Lizama MD 01/08/2025, 12:34 PM       COMMUNICATION  - I discussed with patient's daughter 01/08/25  - I discussed with patient's son 01/08/25        Interval History   No acute events overnight.  Just took a shower which was difficult.  More pain left chest now.    * Data reviewed today: I reviewed all new labs and imaging over the last 24 hours. I personally reviewed no images or ECG's today.    Physical Exam   Most recent vitals:   , Blood pressure 116/70, pulse 65, temperature 97.3  F (36.3  C), temperature source Oral, resp. rate 18, SpO2 97%. O2 Device: None (Room air)    There were no vitals filed for this visit.  Vital signs with ranges:  Temp:  [97.3  F (36.3  C)-98  F (36.7  C)] 97.3  F (36.3  C)  Pulse:  [53-65] 65  Resp:  [16-18] 18  BP: (101-126)/(59-70) 116/70  SpO2:  [96 %-98 %] 97 %  Patient Vitals for the past 24 hrs:   BP Temp Temp src Pulse Resp SpO2   01/08/25 1100 116/70 97.3  F (36.3  C) Oral 65 18 97 %   01/08/25 0731 125/59 97.7  F (36.5  C) Oral 53 18 97 %   01/08/25 0209 121/61 98  F (36.7  C) Oral 57 18 96 %   01/08/25 0022 -- -- -- -- 18 --   01/07/25 2006 126/63 97.5  F (36.4  C) Oral 64 16 98 %   01/07/25 1551 101/69 97.3  F (36.3  C) Oral 65 16 96 %     I/O's last 24 hours:  I/O last 3 completed shifts:  In: 480 [P.O.:480]  Out: -     Constitutional: awake, alert, oriented, pleasant, fatigued appearing   Head:   Eyes:   ENT:   Neck:   Cardiovascular: regular rate and rhythm; no murmurs, rubs or gallops  Lungs: diminished in the bases, no crackles or wheezes  Gastrointestinal/Abdomen: soft, non-tender, non-distended, positive bowel sounds  :   Musculoskeletal:   Skin/Extremities: No bilateral lower extremity pitting edema.  Neurologic:   Psychiatric:   Hematologic/Lymphatic/Immunologic:     Labs reviewed.  Recent Labs   Lab 01/06/25  0823 01/03/25  0824  "01/02/25  1140    185  --    NA  --   --  134*   POTASSIUM  --   --  3.8   CHLORIDE  --   --  102   CO2  --   --  21*   BUN  --   --  26.0*   CR  --   --  0.99*   ANIONGAP  --   --  11   ARABELLA  --   --  9.0   GLC  --   --  117*     Recent Labs   Lab Test 01/07/23  1241 10/04/18  1550   NT-PROBNP, INPATIENT 1,013 349     Recent Labs   Lab 01/02/25  1140   *     No lab results found.    No results for input(s): \"INR\", \"[id number]\" in the last 168 hours.  No results for input(s): \"WBC\", \"CRP\", \"DD\", \"LDH\", \"FIBR\", \"DEBBI\", \"IL6B\", \"LACT\", \"LACTS\", \"PCAL\", \"[id number]\" in the last 168 hours.      MICRO:  Cultures (including blood and urine):  No lab results found in last 7 days.    No results found for this or any previous visit (from the past 24 hours).    Medications   All medications were reviewed. MAR.    Infusions:  Current Facility-Administered Medications   Medication Dose Route Frequency Provider Last Rate Last Admin     Scheduled Medications:  Current Facility-Administered Medications   Medication Dose Route Frequency Provider Last Rate Last Admin    acetaminophen (TYLENOL) tablet 975 mg  975 mg Oral Q6H Crystal Falcon PA-C   975 mg at 01/08/25 0822    bumetanide (BUMEX) tablet 2 mg  2 mg Oral Daily Crystal Falcon PA-C   2 mg at 01/08/25 0823    carvedilol (COREG) tablet 25 mg  25 mg Oral BID w/meals Crystal Falcon PA-C   25 mg at 01/08/25 0823    guaiFENesin (MUCINEX) 12 hr tablet 600 mg  600 mg Oral BID Crystal Falcon PA-C   600 mg at 01/08/25 0823    heparin ANTICOAGULANT injection 5,000 Units  5,000 Units Subcutaneous Q8H Crystal Falcon PA-C   5,000 Units at 01/08/25 1204    Lidocaine (LIDOCARE) 4 % Patch 2 patch  2 patch Transdermal Q24H Crystal Falcon PA-C   2 patch at 01/08/25 1203    magnesium hydroxide (MILK OF MAGNESIA) suspension 30 mL  30 mL Oral Daily Crystal Falcon PA-C   30 mL at 01/07/25 0938    potassium " chloride payton ER (KLOR-CON M10) CR tablet 10 mEq  10 mEq Oral BID Crystal Falcon PA-C   10 mEq at 01/08/25 1204    pravastatin (PRAVACHOL) tablet 40 mg  40 mg Oral At Bedtime Crystal Falcon PA-C   40 mg at 01/07/25 2055    sennosides (SENOKOT) tablet 2 tablet  2 tablet Oral At Bedtime Crystal Falcon PA-C   2 tablet at 01/07/25 2056    sodium chloride (PF) 0.9% PF flush 3 mL  3 mL Intracatheter Q8H Crystal Falcon PA-C   3 mL at 01/08/25 0205    spironolactone (ALDACTONE) tablet 25 mg  25 mg Oral Daily Crystal Falcon PA-C   25 mg at 01/08/25 0823     PRN Medications:  Current Facility-Administered Medications   Medication Dose Route Frequency Provider Last Rate Last Admin    benzonatate (TESSALON) capsule 100 mg  100 mg Oral TID PRN Crystal Falcon PA-C        guaiFENesin (ROBITUSSIN) 20 mg/mL solution 200 mg  200 mg Oral Q4H PRN Crystal Falcon PA-C        methocarbamol (ROBAXIN) tablet 500 mg  500 mg Oral TID PRN Crystal Falcon PA-C        naloxone (NARCAN) injection 0.2 mg  0.2 mg Intravenous Q2 Min PRN Crystal Falcon PA-C        Or    naloxone (NARCAN) injection 0.4 mg  0.4 mg Intravenous Q2 Min PRN Crystal Falcon PA-C        Or    naloxone (NARCAN) injection 0.2 mg  0.2 mg Intramuscular Q2 Min PRN Crystal Falcon PA-C        Or    naloxone (NARCAN) injection 0.4 mg  0.4 mg Intramuscular Q2 Min PRN Crystal Falcon PA-C        ondansetron (ZOFRAN ODT) ODT tab 4 mg  4 mg Oral Q6H PRN Crystal Falcon PA-C        Or    ondansetron (ZOFRAN) injection 4 mg  4 mg Intravenous Q6H PRN Crystal Falcon PA-C        oxyCODONE (ROXICODONE) tablet 5 mg  5 mg Oral Q4H PRN Crystal Falcon PA-C        oxyCODONE IR (ROXICODONE) half-tab 2.5 mg  2.5 mg Oral Q4H PRN Crystal Falcon PA-C        prochlorperazine (COMPAZINE) injection 5 mg  5 mg Intravenous Q6H PRN Ezekiel  Crystal Dixon PA-C        Or    prochlorperazine (COMPAZINE) tablet 5 mg  5 mg Oral Q6H PRN Crystal Falcon PA-C        senna-docusate (SENOKOT-S/PERICOLACE) 8.6-50 MG per tablet 1 tablet  1 tablet Oral BID PRN Crystal Falcon PA-C        Or    senna-docusate (SENOKOT-S/PERICOLACE) 8.6-50 MG per tablet 2 tablet  2 tablet Oral BID PRN Crystal Falcon PA-C        sodium chloride (PF) 0.9% PF flush 3 mL  3 mL Intracatheter q1 min prn Crystal Falcon PA-C

## 2025-01-08 NOTE — PLAN OF CARE
Goal Outcome Evaluation:             PRIMARY Concern: fall, L 4th and 10th rib fx  SAFETY RISK Concerns (fall risk, behaviors, etc.): fall risk  Aggression Tool Color: Green  Isolation/Type: n/a  Tests/Procedures for NEXT shift: n/a  Consults? (Pending/following, signed-off?) Thoracic surgery signed off; PT rec TCU. SW following for placement.  Where is patient from? (Home, TCU, etc.): [facility]  Other Important info for NEXT shift:   Anticipated DC date & active delays: TBD, pending insurance autho     SUMMARY NOTE:  Orientation/Cognitive: A/O x4, forgetful, [place].   Observation Goals (Met/ Not Met): group home care  Mobility Level/Assist Equipment: A1 GB/W  Antibiotics & Plan (IV/po, length of tx left): N/A  Pain Management: Scheduled tylenol, pain rated 7/10, lidocaine patch under left breast.   Tele/VS/O2: VSS on RA, infrequent non productive cough.  ABNL Lab/BG: No recent labs  Bowel/Bladder: Incont at times, up to bathroom with assist of 1, GB/W.   Skin Concerns: scattered bruises/edema BLEs, redness to coccyx. Large RLQ Abd  hernia.  Drains/Devices: No IV access.  Patient Stated Goal for Today: Pt had a shower today.

## 2025-01-09 ENCOUNTER — APPOINTMENT (OUTPATIENT)
Dept: OCCUPATIONAL THERAPY | Facility: CLINIC | Age: OVER 89
End: 2025-01-09
Attending: HOSPITALIST
Payer: COMMERCIAL

## 2025-01-09 ENCOUNTER — APPOINTMENT (OUTPATIENT)
Dept: PHYSICAL THERAPY | Facility: CLINIC | Age: OVER 89
End: 2025-01-09
Attending: HOSPITALIST
Payer: COMMERCIAL

## 2025-01-09 VITALS
TEMPERATURE: 98 F | SYSTOLIC BLOOD PRESSURE: 136 MMHG | OXYGEN SATURATION: 97 % | DIASTOLIC BLOOD PRESSURE: 71 MMHG | HEART RATE: 63 BPM | RESPIRATION RATE: 16 BRPM

## 2025-01-09 LAB — PLATELET # BLD AUTO: 229 10E3/UL (ref 150–450)

## 2025-01-09 PROCEDURE — 97530 THERAPEUTIC ACTIVITIES: CPT | Mod: GP

## 2025-01-09 PROCEDURE — 96372 THER/PROPH/DIAG INJ SC/IM: CPT | Performed by: PHYSICIAN ASSISTANT

## 2025-01-09 PROCEDURE — 97535 SELF CARE MNGMENT TRAINING: CPT | Mod: GO

## 2025-01-09 PROCEDURE — 36415 COLL VENOUS BLD VENIPUNCTURE: CPT | Performed by: PHYSICIAN ASSISTANT

## 2025-01-09 PROCEDURE — 250N000013 HC RX MED GY IP 250 OP 250 PS 637: Performed by: PHYSICIAN ASSISTANT

## 2025-01-09 PROCEDURE — 99207 PR NO BILLABLE SERVICE THIS VISIT: CPT | Performed by: INTERNAL MEDICINE

## 2025-01-09 PROCEDURE — 250N000011 HC RX IP 250 OP 636: Performed by: PHYSICIAN ASSISTANT

## 2025-01-09 PROCEDURE — 85049 AUTOMATED PLATELET COUNT: CPT | Performed by: PHYSICIAN ASSISTANT

## 2025-01-09 RX ADMIN — BUMETANIDE 2 MG: 1 TABLET ORAL at 08:19

## 2025-01-09 RX ADMIN — SPIRONOLACTONE 25 MG: 25 TABLET ORAL at 08:19

## 2025-01-09 RX ADMIN — CARVEDILOL 25 MG: 25 TABLET, FILM COATED ORAL at 08:19

## 2025-01-09 RX ADMIN — ACETAMINOPHEN 975 MG: 325 TABLET, FILM COATED ORAL at 14:09

## 2025-01-09 RX ADMIN — LIDOCAINE 2 PATCH: 4 PATCH TOPICAL at 08:18

## 2025-01-09 RX ADMIN — ACETAMINOPHEN 975 MG: 325 TABLET, FILM COATED ORAL at 08:19

## 2025-01-09 RX ADMIN — ACETAMINOPHEN 975 MG: 325 TABLET, FILM COATED ORAL at 01:56

## 2025-01-09 RX ADMIN — POTASSIUM CHLORIDE 10 MEQ: 750 TABLET, EXTENDED RELEASE ORAL at 08:19

## 2025-01-09 RX ADMIN — POTASSIUM CHLORIDE 10 MEQ: 750 TABLET, EXTENDED RELEASE ORAL at 12:01

## 2025-01-09 RX ADMIN — HEPARIN SODIUM 5000 UNITS: 5000 INJECTION, SOLUTION INTRAVENOUS; SUBCUTANEOUS at 04:25

## 2025-01-09 RX ADMIN — CARVEDILOL 25 MG: 25 TABLET, FILM COATED ORAL at 17:58

## 2025-01-09 RX ADMIN — GUAIFENESIN 600 MG: 600 TABLET, EXTENDED RELEASE ORAL at 08:19

## 2025-01-09 RX ADMIN — HEPARIN SODIUM 5000 UNITS: 5000 INJECTION, SOLUTION INTRAVENOUS; SUBCUTANEOUS at 12:01

## 2025-01-09 ASSESSMENT — ACTIVITIES OF DAILY LIVING (ADL)
ADLS_ACUITY_SCORE: 66

## 2025-01-09 NOTE — PROGRESS NOTES
Care Management Follow Up    Length of Stay (days): 0    Expected Discharge Date: 01/09/2025     Concerns to be Addressed:       Patient plan of care discussed at interdisciplinary rounds: Yes    Anticipated Discharge Disposition:                Anticipated Discharge Services:    Anticipated Discharge DME:      Patient/family educated on Medicare website which has current facility and service quality ratings:    Education Provided on the Discharge Plan:    Patient/Family in Agreement with the Plan:      Referrals Placed by CM/SW:    Private pay costs discussed: Not applicable    Discussed  Partnership in Safe Discharge Planning  document with patient/family: Yes: verbally     Handoff Completed: No, handoff not indicated or clinically appropriate    Additional Information:  Patient was accepted by [facility]. [facility] declined as did [facility].  Writer updated Briana on acceptance of [facility]. She is grateful. She would like to be updated on [employer] appeal if we hear anything.    Next Steps: follow for discharge planning.    Marylou Park RN

## 2025-01-09 NOTE — PROGRESS NOTES
[id number]  PRIMARY Concern: fall, L 4th and 10th rib fx  SAFETY RISK Concerns (fall risk, behaviors, etc.): fall risk  Aggression Tool Color: Green  Isolation/Type: n/a  Tests/Procedures for NEXT shift: n/a  Consults? (Pending/following, signed-off?) Thoracic surgery signed off; PT rec TCU. SW following for placement.  Where is patient from? (Home, TCU, etc.): [facility]  Other Important info for NEXT shift:   Anticipated DC date & active delays: TBD, pending insurance autho     SUMMARY NOTE:  Orientation/Cognitive: A/O x3, forgetful, [place].   Observation Goals (Met/ Not Met): [facility] care  Mobility Level/Assist Equipment: A1 GB/W  Antibiotics & Plan (IV/po, length of tx left): N/A  Pain Management: Scheduled tylenol  Tele/VS/O2: VSS on RA  ABNL Lab/BG: No recent labs  Bowel/Bladder: Incont at times, up to bathroom with assist of 1, GB/W.   Skin Concerns: scattered bruises/edema BLEs, redness to coccyx. Large RLQ Abd  hernia.  Drains/Devices: No IV access.  Patient Stated Goal for Today: Pt had a shower today.

## 2025-01-09 NOTE — PLAN OF CARE
PRIMARY Concern: Mechanical fall, rib fractures  SAFETY RISK Concerns (fall risk, behaviors, etc.): Fall  Aggression Tool Color: Green  Isolation/Type: None  Tests/Procedures for NEXT shift: None  Consults? (Pending/following, signed-off?): thoracic surgery signed off, PT and SW following  Where is patient from? (Home, TCU, etc.): [facility]  Other Important info for NEXT shift: None  Anticipated DC date & active delays: pending TCU placement with insurance authorization  _____________________________________________________________________________  SUMMARY NOTE:  Orientation/Cognitive: A&Ox3, disoriented to time [facility]  Observation Goals (Met/ Not Met): half-way care  Mobility Level/Assist Equipment: SBA with walker  Antibiotics & Plan (IV/po, length of tx left): None  Pain Management: denies  Tele/VS/O2: VSS on RA  ABNL Lab/BG: None  Diet: Regular  Bowel/Bladder: Incont at times, no BM this shift  Skin Concerns: scattered bruising, edema, redness to coccyx, RLQ abd hernia  Drains/Devices: No PIV  Patient Stated Goal for Today: rest

## 2025-01-09 NOTE — PLAN OF CARE
Goal Outcome Evaluation:    Top portion must ALWAYS be completed  PRIMARY Concern: Mechanical fall, rib fractures  SAFETY RISK Concerns (fall risk, behaviors, etc.):   fall precautions  Aggression Tool Color: Green  Isolation/Type: None  Tests/Procedures for NEXT shift: None  Consults? (Pending/following, signed-off?): thoracic surgery signed off, PT and SW following  Where is patient from? (Home, TCU, etc.): [facility]  Other Important info for NEXT shift: None  Anticipated DC date & active delays: pending TCU placement with insurance authorization  _____________________________________________________________________________  SUMMARY NOTE:  Orientation/Cognitive: A/O x3, forgetful, [place]  Observation Goals (Met/ Not Met): half-way care  Mobility Level/Assist Equipment: A1 GBW  Antibiotics & Plan (IV/po, length of tx left): None  Pain Management: denies  Tele/VS/O2: VSS on RA  ABNL Lab/BG: None  Diet: Regular  Bowel/Bladder: Incont at times, no BM this shift  Skin Concerns: scattered bruising, edema, redness to coccyx, RLQ abd hernia  Drains/Devices: No PIV  Patient Stated Goal for Today: rest

## 2025-01-09 NOTE — PLAN OF CARE
Physical Therapy Discharge Summary    Reason for therapy discharge:    All goals and outcomes met, no further needs identified.  No further expectations of functional progress in acute care setting.    Progress towards therapy goal(s). See goals on Care Plan in [facility] electronic health record for goal details.  Goals met    Therapy recommendation(s):    Continued therapy is recommended.  Rationale/Recommendations:  Patient remains below baseline level of function secondary to acute rib pain in addition to her chronic R Knee pain but is performing transfers Mod I with a 4WW and ambulating short distance Mod I and community distances with SBA and 4WW. Despite being slightly below baseline, Acute PT goals met at this time as no further skilled therapy is needed with only limiting factor being her pain. Patient does continue to require physical assist with lower body dressing/ADLs, will defer further training on this to OT team who is continuing to work with patient. Acute PT will sign off. Recommend HHPT vs OP PT to progress overall endurance and strength.

## 2025-01-09 NOTE — PROGRESS NOTES
"[facility]    Internal Medicine Hospitalist Progress Note  01/09/2025  I evaluated patient on the above date.    Linus Lizama Jr., MD  [phone] (p)  Text Page  Vocera      [New actions/orders today (01/09/2025) are underlined in the assessment and plan. All lab results in the assessment and plan were reviewed.]  Assessment & Plan   Deborah \"Tammi\" Neftaly is a 89-year-old female with past medical history significant for hypertension; hyperlipidemia; and CKD stage III; who lives at assisted facility; who presented to [facility] on 12/29/2024 with chief complaint of fall likely mechanical and was diagnosed with multiple rib fractures after she complained of left-sided chest pain.     On initial evaluation 12/29, was afebrile, hypertensive, tachypneic, sats in 90's. Labs notable for CBC normal; BMP with BUN 26.3, cr 1.19.    CXR showed acute mildly displaced fractures of the anterior left fifth, sixth, seventh, eighth, and ninth ribs; question trace left pleural fluid; no significant pneumothorax.     CT chest w/o contrast showed subtle nondisplaced fractures involving the left fourth and left 10th ribs; several old bilateral rib fractures and old sternal fracture; scattered fibrotic changes and subsegmental atelectasis both lungs, otherwise no acute pulmonary disease.    She was admitted to [facility] for pulmonary hygiene, respiratory monitoring and pain control. She was medically stabilized with subsequent plan for TCU. Transitioned to long term care status 1/3/2024.    Planned TCU, but insurance denied coverage. Pt/family appealed insurance decision, appeal decision pending.       Left 4th and 10th rib fractures (non-displaced) secondary to mechanical fall.  Chest congestion.  * Initial presentation as above. Admitted under observation and started on analgesics, respiratory cares.  * 1/6: Pain controlled. Despite recommendations from therapies, [employer] insurance " "denied insurance coverage for TCU. PT and OT reconsulted. PT and OT recommended TCU. Pt/family appealed [employer] decision.  - Continue acetaminophen 975 mg q6h; lidocaine patch 4%, 2 patches daily; PRN acetaminophen, PRN methocarbamol, PRN oxycodone, minimize opioids as able.  - Continue guaifenesin 600 mg BID; PRN guaifenesin, PRN benzonatate.   - Continue PT and OT - planned TCU, but insurance denied coverage despite recommendations from PT and OT and pt/family appealing insurance decision, appeal decision pending.  - Continue to encourage deep breaths, activity as tolerated and incentive spirometry.    Acute kidney injury on CKD stage 3, suspect prerenal.  * Baseline creatinine is around 1.2-1.3.  * Cr 1.19 on admit.  * Cr up to 1.61 on 12/31/2024.  * Given IVF's.  * Cr 0.99 on 1/2/2025.  * PTA bumetanide and spironolactone held, restarted 1/3.   - Continue to monitor BMP outpatient.  - Avoid nephrotoxic medications.    Hyponatremia, suspect nutritional/hypovolemia.  * Sodium normal on admit.   * Sodium 131 on 1/1.  * Given IVF's this hospitalization.  * Sodium 134 on 1/2.     Hypertension (benign essential).  Peripheral edema.  [PTA: bumetanide 2 mg daily; carvedilol 25 mg BID; spironolactone 25 mg daily.]  * PTA bumetanide and spironolactone held for NICK, restarted 1/3.  - Continue bumetanide, carvedilol, and spironolactone.    Hyperlipidemia/dyslipidemia.  - Continue pravastatin.      Clinically Significant Risk Factors Present on Admission                   # Hypertension: Noted on problem list           # Obesity: Estimated body mass index is 33.29 kg/m  as calculated from the following:    Height as of 12/29/24: 1.575 m (5' 2\").    Weight as of 12/29/24: 82.6 kg (182 lb).              Diet: Regular Diet Adult  Room Service  Diet    Prophylaxis: PCD's and ambulation  Ferraro Catheter: Not present  Lines: None     Code Status: No CPR- Do NOT Intubate    Disposition Plan   Medically Ready for Discharge: Ready " Now  Expected discharge to home with family pending TCU/rehab/facility bed availability; pt/family appealing insurance denial to cover TCU stay.    Entered: Linus Lizama MD 01/09/2025, 11:39 AM     COMMUNICATION  - I discussed with patient's daughter 01/09/25          Interval History   No acute events overnight.  Still with left chest pain at times.  Doing OK overall.    * Data reviewed today: I reviewed all new labs and imaging over the last 24 hours. I personally reviewed no images or ECG's today.    Physical Exam   Most recent vitals:   , Blood pressure 136/71, pulse 63, temperature 98  F (36.7  C), temperature source Oral, resp. rate 16, SpO2 97%. O2 Device: None (Room air)    There were no vitals filed for this visit.  Vital signs with ranges:  Temp:  [97.5  F (36.4  C)-98  F (36.7  C)] 98  F (36.7  C)  Pulse:  [56-69] 63  Resp:  [16-19] 16  BP: (105-136)/(56-71) 136/71  SpO2:  [95 %-97 %] 97 %  Patient Vitals for the past 24 hrs:   BP Temp Temp src Pulse Resp SpO2   01/09/25 0815 136/71 98  F (36.7  C) Oral 63 16 97 %   01/09/25 0152 119/56 97.5  F (36.4  C) Oral 56 18 96 %   01/08/25 1955 113/60 97.9  F (36.6  C) Oral 69 18 97 %   01/08/25 1616 105/64 97.5  F (36.4  C) Oral 64 19 95 %     I/O's last 24 hours:  I/O last 3 completed shifts:  In: 600 [P.O.:600]  Out: -     Constitutional: awake, alert, oriented, pleasant, fatigued appearing   Head:   Eyes:   ENT:   Neck:   Cardiovascular: regular rate and rhythm; no murmurs, rubs or gallops  Lungs: diminished in the bases, no crackles or wheezes  Gastrointestinal/Abdomen: soft, non-tender, non-distended, positive bowel sounds  :   Musculoskeletal:   Skin/Extremities:   Neurologic:   Psychiatric:   Hematologic/Lymphatic/Immunologic:     Labs reviewed.  Recent Labs   Lab 01/09/25  0722 01/06/25  0823 01/03/25  0824 01/02/25  1140    243 185  --    NA  --   --   --  134*   POTASSIUM  --   --   --  3.8   CHLORIDE  --   --   --  102   CO2  --   --    "--  21*   BUN  --   --   --  26.0*   CR  --   --   --  0.99*   ANIONGAP  --   --   --  11   ARABELLA  --   --   --  9.0   GLC  --   --   --  117*     Recent Labs   Lab Test 01/07/23  1241 10/04/18  1550   NT-PROBNP, INPATIENT 1,013 349     Recent Labs   Lab 01/02/25  1140   *     No lab results found.    No results for input(s): \"INR\", \"[id number]\" in the last 168 hours.  No results for input(s): \"WBC\", \"CRP\", \"DD\", \"LDH\", \"FIBR\", \"DEBBI\", \"IL6B\", \"LACT\", \"LACTS\", \"PCAL\", \"[id number]\" in the last 168 hours.      MICRO:  Cultures (including blood and urine):  No lab results found in last 7 days.    No results found for this or any previous visit (from the past 24 hours).    Medications   All medications were reviewed. MAR.    Infusions:  Current Facility-Administered Medications   Medication Dose Route Frequency Provider Last Rate Last Admin     Scheduled Medications:  Current Facility-Administered Medications   Medication Dose Route Frequency Provider Last Rate Last Admin    acetaminophen (TYLENOL) tablet 975 mg  975 mg Oral Q6H Crystal Falcon PA-C   975 mg at 01/09/25 0819    bumetanide (BUMEX) tablet 2 mg  2 mg Oral Daily Crystal Falcon PA-C   2 mg at 01/09/25 0819    carvedilol (COREG) tablet 25 mg  25 mg Oral BID w/meals Crystal Falcon PA-C   25 mg at 01/09/25 0819    guaiFENesin (MUCINEX) 12 hr tablet 600 mg  600 mg Oral BID Crystal Falcon PA-C   600 mg at 01/09/25 0819    heparin ANTICOAGULANT injection 5,000 Units  5,000 Units Subcutaneous Q8H Crystal Falcon PA-C   5,000 Units at 01/09/25 0425    Lidocaine (LIDOCARE) 4 % Patch 2 patch  2 patch Transdermal Q24H Crystal Falcon PA-C   2 patch at 01/09/25 0818    magnesium hydroxide (MILK OF MAGNESIA) suspension 30 mL  30 mL Oral Daily Crystal Falcon PA-C   30 mL at 01/07/25 0938    potassium chloride payton ER (KLOR-CON M10) CR tablet 10 mEq  10 mEq Oral BID Crystal Falcon, " RALF   10 mEq at 01/09/25 0819    pravastatin (PRAVACHOL) tablet 40 mg  40 mg Oral At Bedtime Crystal Falcon PA-C   40 mg at 01/08/25 2048    sennosides (SENOKOT) tablet 2 tablet  2 tablet Oral At Bedtime Crystal Falcon PA-C   2 tablet at 01/08/25 2048    sodium chloride (PF) 0.9% PF flush 3 mL  3 mL Intracatheter Q8H Crystal Falcon PA-C   3 mL at 01/08/25 0205    spironolactone (ALDACTONE) tablet 25 mg  25 mg Oral Daily Crystal Falcon PA-C   25 mg at 01/09/25 0819     PRN Medications:  Current Facility-Administered Medications   Medication Dose Route Frequency Provider Last Rate Last Admin    benzonatate (TESSALON) capsule 100 mg  100 mg Oral TID PRN Crystal Falcon PA-C        guaiFENesin (ROBITUSSIN) 20 mg/mL solution 200 mg  200 mg Oral Q4H PRN Crystal Falcon PA-C        methocarbamol (ROBAXIN) tablet 500 mg  500 mg Oral TID PRN Crystal Falcon PA-C        naloxone (NARCAN) injection 0.2 mg  0.2 mg Intravenous Q2 Min PRN Crystal Falcon PA-C        Or    naloxone (NARCAN) injection 0.4 mg  0.4 mg Intravenous Q2 Min PRN Crystal Falcon PA-C        Or    naloxone (NARCAN) injection 0.2 mg  0.2 mg Intramuscular Q2 Min PRN Crystal Falcon PA-C        Or    naloxone (NARCAN) injection 0.4 mg  0.4 mg Intramuscular Q2 Min PRN Crystal Falcon PA-C        ondansetron (ZOFRAN ODT) ODT tab 4 mg  4 mg Oral Q6H PRN Crystal Falcon PA-C        Or    ondansetron (ZOFRAN) injection 4 mg  4 mg Intravenous Q6H PRN Crystal Falcon PA-C        oxyCODONE (ROXICODONE) tablet 5 mg  5 mg Oral Q4H PRN Crystal Falcon PA-C        oxyCODONE IR (ROXICODONE) half-tab 2.5 mg  2.5 mg Oral Q4H PRN Crystal Falcon PA-C        prochlorperazine (COMPAZINE) injection 5 mg  5 mg Intravenous Q6H PRN Crystal Falcon PA-C        Or    prochlorperazine (COMPAZINE) tablet 5 mg  5 mg Oral Q6H  PRN Crystal Falcon PA-C        senna-docusate (SENOKOT-S/PERICOLACE) 8.6-50 MG per tablet 1 tablet  1 tablet Oral BID PRN Crystal Falcon PA-C        Or    senna-docusate (SENOKOT-S/PERICOLACE) 8.6-50 MG per tablet 2 tablet  2 tablet Oral BID PRN Crystal Falcon PA-C        sodium chloride (PF) 0.9% PF flush 3 mL  3 mL Intracatheter q1 min prn Crystal Falcon PA-C

## 2025-01-09 NOTE — PROGRESS NOTES
Care Management Follow Up    Length of Stay (days): 0    Expected Discharge Date: 01/09/2025     Concerns to be Addressed:       Patient plan of care discussed at interdisciplinary rounds: Yes    Anticipated Discharge Disposition:                Anticipated Discharge Services:    Anticipated Discharge DME:      Patient/family educated on Medicare website which has current facility and service quality ratings:    Education Provided on the Discharge Plan:    Patient/Family in Agreement with the Plan:      Referrals Placed by CM/SW:    Private pay costs discussed: Not applicable    Discussed  Partnership in Safe Discharge Planning  document with patient/family: No     Handoff Completed: No, handoff not indicated or clinically appropriate    Additional Information:  SW attempted to talk with [facility] regarding insurance appeal. They provided the reference ID number of [id number] to identify the case. They also stated that [facility][phone][facility] and that we would need to call them to confirm status of appeal and make any changes to the appeal. SW called Located within [facility] and they confirmed that insurance authorization has been approved from Jan 8 - Jan 13 as [facility] allows for a 6 day period for patient to admit. CARLOTTA did ask about changing the facility on the case from [facility] [place] to [facility]. She states she will have to work on this. SW to call in the morning to confirm facility has been changed.     Next Steps: call [facility] in the morning     NAHOMI Funez       intact

## 2025-01-10 NOTE — PROGRESS NOTES
SW: Received call from daughter Briana wondering what home care's phone number is. Provided Inter[facility]'s phone number to Briana.    JESSIE Veras  Social Work  [facility]

## 2025-01-10 NOTE — PLAN OF CARE
Occupational Therapy Discharge Summary    Reason for therapy discharge:    Discharged to home with home therapy.    Progress towards therapy goal(s). See goals on Care Plan in [facility] electronic health record for goal details.  Goals partially met.  Barriers to achieving goals:   discharge from facility.    Therapy recommendation(s):    Continued therapy is recommended.  Rationale/Recommendations:  Patient presents slightly below baseline with bathing, toileting, IADLs, currently SBA with toilet transfer and LB dressing with sock aid. Anticipate with continued IP OT, pt could progress for home with initial supervision with bathing, [facility] OT for home safety and home set up recs, and a sock aid. OT will continue to follow.

## 2025-05-24 NOTE — PLAN OF CARE
Problem: Patient Care Overview  Goal: Plan of Care/Patient Progress Review  Outcome: Adequate for Discharge Date Met: 10/06/18  AVS and discharge meds explained to pt, all questions answered, 5 med rights used, discharge scripts sent to pharmacy of choice. Belongings taken w/ pt. Pt left unit via w/c by NA, daughter providing ride home for pt.      
Problem: Patient Care Overview  Goal: Plan of Care/Patient Progress Review  Outcome: Improving   Observation goals PRIOR TO DISCHARGE     Comments:   -vital signs normal or at patient baseline: Not met      -returns to baseline functional status: Not met      -BP under stable/ improved control              
Problem: Patient Care Overview  Goal: Plan of Care/Patient Progress Review  Outcome: Improving  A&O x4, VSS except BP elevated with activity up to 190s. PA  is aware. LE edema 2+. Pt tolerates regular diet and uses a walker to ambulate to bathroom. IV site is saline locked. Tylenol was given for a headache that improved. PT evaluated pt, see note. Possible discharge will be tomorrow. Will continue to monitor.      
Problem: Patient Care Overview  Goal: Plan of Care/Patient Progress Review  Outcome: Improving  A&Ox4, forgetful at times. BP elevated, 180s, scheduled hydralazine given. Cardiac diet, no caffeine. Headache managed w/ tylenol. Dyspnea on exertion. SBA/ind w/ walker. Denies n/t, nausea, dizziness. PT consult completed. Continue to monitor.      
Problem: Patient Care Overview  Goal: Plan of Care/Patient Progress Review  Outcome: Improving  A&Ox4. BP elevated, scheduled BP meds given, otherwise VSS on RA. Cardiac diet, no caffeine. Ind/SBA, c/o dyspnea on exertion, per pt baseline, 94% on RA when ambulating. Trace edema BLE. Denies pain, n/t, nausea, dizziness, SOB @ rest. No IV access. Daughter in room w/ pt. Plan to recheck BP and give amlodipine later, pending BP trending down, discharge later this afternoon. Continue to monitor.      
Problem: Patient Care Overview  Goal: Plan of Care/Patient Progress Review  Outcome: Improving  Pt is A+O x 4. BP improved to 167/76, 156/81, asymptomatic. Other VSS on RA. Given Acetaminophen for headache. Up w/SBA. LS clear but diminished. No SOB on with amb, will cont to monitor. 2+ ankle/foot edema. Tolerated diet. Up w/SBA. Voiding. PT ordered.      
Problem: Patient Care Overview  Goal: Plan of Care/Patient Progress Review  Outcome: No Change  AVSS; /59; HR 71;  pt's headache/neck ache relieved with repositioning; pt appeared to sleep well overnight; pt denied shortness of breath; PT consult ordered; pt up with 1 assist and a walker; voiding without difficulty; continue to monitor.      
Problem: Patient Care Overview  Goal: Plan of Care/Patient Progress Review  Outcome: No Change  Observation Goals:    -vital signs normal or at patient baseline - met  -returns to baseline functional status - met  -BP under stable/ improved control - met        
Problem: Patient Care Overview  Goal: Plan of Care/Patient Progress Review  Outcome: No Change  Observation Goals:    -vital signs normal or at patient baseline - met  -returns to baseline functional status - met  -BP under stable/ improved control - met  PT will evaluate pt        
Problem: Patient Care Overview  Goal: Plan of Care/Patient Progress Review  Outcome: No Change  Observation Goals:  -Vital signs normal or at patient baseline - Partially met  -Returns to baseline functional status - Partially met  -BP under stable/ improved control - Partially met        
Problem: Patient Care Overview  Goal: Plan of Care/Patient Progress Review  Outcome: No Change  Observation Goals:  -Vital signs normal or at patient baseline - Partially met  -Returns to baseline functional status - Partially met  -BP under stable/ improved control - Partially met    Care Plan Summary Note: Pt A&OxO with minor forgetfulness. VSS ex BPs but trending down. Denies any pain, N&V, headache, SOB, dyspnea. 2 gram Na, No caffeine. PT consulted.o IV site. SBA with walker. Continue to monitor.    
Problem: Patient Care Overview  Goal: Plan of Care/Patient Progress Review  Outcome: No Change  Observation Goals:  -Vital signs normal or at patient baseline - Partially met, BP high 170s this am  -Returns to baseline functional status - Met  -BP under stable/ improved control - Partially met        
Problem: Patient Care Overview  Goal: Plan of Care/Patient Progress Review  Outcome: No Change  Observation Goals:  -Vital signs normal or at patient baseline - Partially met, BP high scheduled hydralazine given, will recheck  -Returns to baseline functional status - Met  -BP under stable/ improved control - Partially met        
Problem: Patient Care Overview  Goal: Plan of Care/Patient Progress Review  PT: PT orders received, initial eval completed, daughter present for eval. Patient lives alone in a 4th floor condo, elevator acces, with 1 step down into a sunken living/dining room. Pt was previously independent with all ADLs and mobility without a device, drives, manages her own errands. She does have a SEC which she does not use. Pt was admitted under OBS status with severe /138 in the ED.    Discharge Planner PT   Patient plan for discharge: home  Current status: BP has been steadily returning to normal range wit most recent reading 136/71. Pt demonstrates independence with bed mobility, transfers and gait x 200' without a device and no LOB. Able to negotiate a single step with pt holding onto a wall as she does at home. BP elevated with gait 194/91, RALF notified.  Barriers to return to prior living situation: no physical barriers, medical status with BP elevated with activity  Recommendations for discharge: home  Rationale for recommendations: No further skilled PT needs identified, will sign off. Pt, daughter and RALF in agreement.       Entered by: Natali Gaviria 10/05/2018 2:04 PM           
no

## (undated) RX ORDER — AMINOPHYLLINE 25 MG/ML
INJECTION, SOLUTION INTRAVENOUS
Status: DISPENSED
Start: 2023-01-10

## (undated) RX ORDER — ALBUTEROL SULFATE 90 UG/1
AEROSOL, METERED RESPIRATORY (INHALATION)
Status: DISPENSED
Start: 2023-01-10

## (undated) RX ORDER — REGADENOSON 0.08 MG/ML
INJECTION, SOLUTION INTRAVENOUS
Status: DISPENSED
Start: 2023-01-10